# Patient Record
Sex: FEMALE | Race: WHITE | HISPANIC OR LATINO | Employment: UNEMPLOYED | URBAN - METROPOLITAN AREA
[De-identification: names, ages, dates, MRNs, and addresses within clinical notes are randomized per-mention and may not be internally consistent; named-entity substitution may affect disease eponyms.]

---

## 2018-01-03 ENCOUNTER — OFFICE VISIT (OUTPATIENT)
Dept: URGENT CARE | Facility: CLINIC | Age: 53
End: 2018-01-03
Payer: COMMERCIAL

## 2018-01-03 VITALS
RESPIRATION RATE: 16 BRPM | BODY MASS INDEX: 33.13 KG/M2 | SYSTOLIC BLOOD PRESSURE: 147 MMHG | WEIGHT: 180 LBS | TEMPERATURE: 98 F | HEIGHT: 62 IN | OXYGEN SATURATION: 96 % | DIASTOLIC BLOOD PRESSURE: 89 MMHG | HEART RATE: 82 BPM

## 2018-01-03 DIAGNOSIS — J01.90 ACUTE BACTERIAL SINUSITIS: Primary | ICD-10-CM

## 2018-01-03 DIAGNOSIS — J20.9 ACUTE PURULENT BRONCHITIS: ICD-10-CM

## 2018-01-03 DIAGNOSIS — B96.89 ACUTE BACTERIAL SINUSITIS: Primary | ICD-10-CM

## 2018-01-03 PROCEDURE — 99213 OFFICE O/P EST LOW 20 MIN: CPT | Mod: 25,S$GLB,, | Performed by: INTERNAL MEDICINE

## 2018-01-03 PROCEDURE — 96372 THER/PROPH/DIAG INJ SC/IM: CPT | Mod: S$GLB,,, | Performed by: INTERNAL MEDICINE

## 2018-01-03 RX ORDER — ROSUVASTATIN CALCIUM 20 MG/1
20 TABLET, COATED ORAL DAILY
COMMUNITY
End: 2023-01-19

## 2018-01-03 RX ORDER — LEVOTHYROXINE SODIUM 25 UG/1
25 TABLET ORAL DAILY
COMMUNITY

## 2018-01-03 RX ORDER — AMOXICILLIN AND CLAVULANATE POTASSIUM 875; 125 MG/1; MG/1
1 TABLET, FILM COATED ORAL EVERY 12 HOURS
Qty: 20 TABLET | Refills: 0 | Status: SHIPPED | OUTPATIENT
Start: 2018-01-03 | End: 2018-01-13

## 2018-01-03 RX ORDER — PANTOPRAZOLE SODIUM 40 MG/1
40 TABLET, DELAYED RELEASE ORAL DAILY
COMMUNITY
End: 2022-11-22

## 2018-01-03 RX ORDER — CODEINE PHOSPHATE AND GUAIFENESIN 10; 100 MG/5ML; MG/5ML
5 SOLUTION ORAL EVERY 4 HOURS PRN
Qty: 118 ML | Refills: 0 | Status: SHIPPED | OUTPATIENT
Start: 2018-01-03 | End: 2018-01-13

## 2018-01-03 RX ORDER — CARVEDILOL 6.25 MG/1
6.25 TABLET ORAL 2 TIMES DAILY WITH MEALS
COMMUNITY
End: 2018-06-18 | Stop reason: ALTCHOICE

## 2018-01-03 RX ORDER — BETAMETHASONE SODIUM PHOSPHATE AND BETAMETHASONE ACETATE 3; 3 MG/ML; MG/ML
9 INJECTION, SUSPENSION INTRA-ARTICULAR; INTRALESIONAL; INTRAMUSCULAR; SOFT TISSUE ONCE
Status: COMPLETED | OUTPATIENT
Start: 2018-01-03 | End: 2018-01-03

## 2018-01-03 RX ADMIN — BETAMETHASONE SODIUM PHOSPHATE AND BETAMETHASONE ACETATE 9 MG: 3; 3 INJECTION, SUSPENSION INTRA-ARTICULAR; INTRALESIONAL; INTRAMUSCULAR; SOFT TISSUE at 03:01

## 2018-01-03 NOTE — PROGRESS NOTES
"Subjective:       Patient ID: Amber Khan is a 52 y.o. female.    Vitals:  height is 5' 2" (1.575 m) and weight is 81.6 kg (180 lb). Her temperature is 98.3 °F (36.8 °C). Her blood pressure is 147/89 (abnormal) and her pulse is 82. Her respiration is 16 and oxygen saturation is 96%.     Chief Complaint: Cough (dry cough for 5 days )    Cough   This is a new problem. The current episode started in the past 7 days. The problem has been gradually worsening. The problem occurs constantly. The cough is non-productive. Associated symptoms include chills, ear congestion, ear pain, nasal congestion, a sore throat, shortness of breath and wheezing. The symptoms are aggravated by lying down. She has tried prescription cough suppressant for the symptoms. The treatment provided moderate relief. Her past medical history is significant for asthma and bronchitis.     Review of Systems   Constitution: Positive for chills.   HENT: Positive for congestion, ear pain and sore throat.    Respiratory: Positive for cough, shortness of breath and wheezing.        Objective:      Physical Exam   Constitutional: She appears well-developed and well-nourished.   HENT:   Head: Normocephalic and atraumatic.   Nose: Mucosal edema (purulent mucous discharge) present.   Eyes: Conjunctivae and EOM are normal. Pupils are equal, round, and reactive to light.   Neck: Normal range of motion. Neck supple.   Cardiovascular: Normal rate and regular rhythm.    Pulmonary/Chest: Effort normal.   Upper airway congestion   Vitals reviewed.      Assessment:       1. Acute bacterial sinusitis    2. Acute purulent bronchitis        Plan:         Acute bacterial sinusitis  -     betamethasone acetate-betamethasone sodium phosphate injection 9 mg; Inject 1.5 mLs (9 mg total) into the muscle once.  -     amoxicillin-clavulanate 875-125mg (AUGMENTIN) 875-125 mg per tablet; Take 1 tablet by mouth every 12 (twelve) hours.  Dispense: 20 tablet; Refill: 0    Acute " purulent bronchitis  -     betamethasone acetate-betamethasone sodium phosphate injection 9 mg; Inject 1.5 mLs (9 mg total) into the muscle once.  -     guaifenesin-codeine 100-10 mg/5 ml (CHERATUSSIN AC)  mg/5 mL syrup; Take 5 mLs by mouth every 4 (four) hours as needed for Cough.  Dispense: 118 mL; Refill: 0

## 2018-06-18 ENCOUNTER — OFFICE VISIT (OUTPATIENT)
Dept: URGENT CARE | Facility: CLINIC | Age: 53
End: 2018-06-18
Payer: COMMERCIAL

## 2018-06-18 VITALS
HEIGHT: 62 IN | OXYGEN SATURATION: 97 % | SYSTOLIC BLOOD PRESSURE: 137 MMHG | BODY MASS INDEX: 34.04 KG/M2 | RESPIRATION RATE: 18 BRPM | TEMPERATURE: 99 F | WEIGHT: 185 LBS | HEART RATE: 85 BPM | DIASTOLIC BLOOD PRESSURE: 84 MMHG

## 2018-06-18 DIAGNOSIS — R42 VERTIGO: ICD-10-CM

## 2018-06-18 DIAGNOSIS — H66.90 OTITIS MEDIA, UNSPECIFIED LATERALITY, UNSPECIFIED OTITIS MEDIA TYPE: Primary | ICD-10-CM

## 2018-06-18 PROCEDURE — 99214 OFFICE O/P EST MOD 30 MIN: CPT | Mod: S$GLB,,, | Performed by: FAMILY MEDICINE

## 2018-06-18 PROCEDURE — 3008F BODY MASS INDEX DOCD: CPT | Mod: CPTII,S$GLB,, | Performed by: FAMILY MEDICINE

## 2018-06-18 RX ORDER — ERGOCALCIFEROL 1.25 MG/1
CAPSULE ORAL
COMMUNITY
Start: 2018-05-03

## 2018-06-18 RX ORDER — AMLODIPINE BESYLATE 10 MG/1
TABLET ORAL
COMMUNITY
Start: 2018-06-04

## 2018-06-18 RX ORDER — PREGABALIN 75 MG/1
CAPSULE ORAL
COMMUNITY
Start: 2018-05-16 | End: 2023-01-19

## 2018-06-18 RX ORDER — MECLIZINE HYDROCHLORIDE 25 MG/1
25 TABLET ORAL 3 TIMES DAILY PRN
Qty: 30 TABLET | Refills: 0 | Status: SHIPPED | OUTPATIENT
Start: 2018-06-18 | End: 2022-11-22

## 2018-06-18 RX ORDER — ONDANSETRON 4 MG/1
4 TABLET, ORALLY DISINTEGRATING ORAL EVERY 8 HOURS PRN
Qty: 20 TABLET | Refills: 0 | Status: SHIPPED | OUTPATIENT
Start: 2018-06-18 | End: 2018-06-25

## 2018-06-18 RX ORDER — AMOXICILLIN AND CLAVULANATE POTASSIUM 875; 125 MG/1; MG/1
1 TABLET, FILM COATED ORAL 2 TIMES DAILY
Qty: 20 TABLET | Refills: 0 | Status: SHIPPED | OUTPATIENT
Start: 2018-06-18 | End: 2018-06-28

## 2018-06-18 RX ORDER — IBUPROFEN 800 MG/1
TABLET ORAL
COMMUNITY
Start: 2018-04-11 | End: 2022-11-22

## 2018-06-18 RX ORDER — AMITRIPTYLINE HYDROCHLORIDE 10 MG/1
TABLET, FILM COATED ORAL
COMMUNITY
Start: 2018-05-27 | End: 2023-03-16

## 2018-06-18 RX ORDER — MONTELUKAST SODIUM 10 MG/1
TABLET ORAL
COMMUNITY
Start: 2018-06-04 | End: 2022-11-22

## 2018-06-18 RX ORDER — LISINOPRIL 20 MG/1
TABLET ORAL
COMMUNITY
Start: 2018-05-23

## 2018-06-18 NOTE — PATIENT INSTRUCTIONS
Middle Ear Infection (Adult)  You have an infection of the middle ear, the space behind the eardrum. This is also called acute otitis media (AOM). Sometimes it is caused by the common cold. This is because congestion can block the internal passage (eustachian tube) that drains fluid from the middle ear. When the middle ear fills with fluid, bacteria can grow there and cause an infection. Oral antibiotics are used to treat this illness, not ear drops. Symptoms usually start to improve within 1 to 2 days of treatment.    Home care  The following are general care guidelines:  · Finish all of the antibiotic medicine given, even though you may feel better after the first few days.  · You may use over-the-counter medicine, such as acetaminophen or ibuprofen, to control pain and fever, unless something else was prescribed. If you have chronic liver or kidney disease or have ever had a stomach ulcer or gastrointestinal bleeding, talk with your healthcare provider before using these medicines. Do not give aspirin to anyone under 18 years of age who has a fever. It may cause severe illness or death.  Follow-up care  Follow up with your healthcare provider, or as advised, in 2 weeks if all symptoms have not gotten better, or if hearing doesn't go back to normal within 1 month.  When to seek medical advice  Call your healthcare provider right away if any of these occur:  · Ear pain gets worse or does not improve after 3 days of treatment  · Unusual drowsiness or confusion  · Neck pain, stiff neck, or headache  · Fluid or blood draining from the ear canal  · Fever of 100.4°F (38°C) or as advised   · Seizure  Date Last Reviewed: 6/1/2016 © 2000-2017 [employer]. [address] [place] [address], [place] [address]. All rights reserved. This information is not intended as a substitute for professional medical care. Always follow your healthcare professional's instructions.    Follow up with your doctor in a few days as  needed.  Return to the urgent care or go to the ER if symptoms get worse.    Sam Lubin MD

## 2018-06-18 NOTE — PROGRESS NOTES
"Subjective:       Patient ID: Amber Khan is a 53 y.o. female.    Vitals:  height is 5' 2" (1.575 m) and weight is 83.9 kg (185 lb). Her oral temperature is 98.6 °F (37 °C). Her blood pressure is 137/84 and her pulse is 85. Her respiration is 18 and oxygen saturation is 97%.     Chief Complaint: Otalgia    Otalgia    There is pain in the right ear. This is a new problem. The current episode started in the past 7 days. The problem occurs constantly. The problem has been gradually worsening. There has been no fever. The pain is at a severity of 5/10. The pain is moderate. Associated symptoms include headaches. Pertinent negatives include no abdominal pain, coughing or sore throat. She has tried ear drops for the symptoms. The treatment provided no relief.     Review of Systems   Constitution: Negative for chills, fever and malaise/fatigue.   HENT: Positive for ear pain. Negative for congestion, hoarse voice and sore throat.    Eyes: Negative for discharge and redness.   Cardiovascular: Negative for chest pain, dyspnea on exertion and leg swelling.   Respiratory: Negative for cough, shortness of breath, sputum production and wheezing.    Musculoskeletal: Negative for myalgias.   Gastrointestinal: Positive for nausea. Negative for abdominal pain.   Neurological: Positive for headaches and vertigo.       Objective:      Physical Exam   Constitutional: She is oriented to person, place, and time. She appears well-developed and well-nourished.   HENT:   Head: Normocephalic and atraumatic.   Right TM redness, auditory canal has moderate cerumen.    Left TM mild redness, auditory canal normal.   Eyes: EOM are normal. Pupils are equal, round, and reactive to light.   Neck: Normal range of motion. Neck supple. No JVD present. No tracheal deviation present. No thyromegaly present.   Cardiovascular: Normal rate, regular rhythm and normal heart sounds.  Exam reveals no gallop and no friction rub.    No murmur " heard.  Pulmonary/Chest: Breath sounds normal. No respiratory distress. She has no wheezes. She has no rales. She exhibits no tenderness.   Abdominal: Soft. Bowel sounds are normal. She exhibits no distension and no mass. There is no tenderness. There is no rebound and no guarding. No hernia.   Musculoskeletal: Normal range of motion. She exhibits no edema, tenderness or deformity.   Lymphadenopathy:     She has no cervical adenopathy.   Neurological: She is alert and oriented to person, place, and time. She displays normal reflexes. No cranial nerve deficit. She exhibits normal muscle tone. Coordination normal.   Skin: Skin is warm. Capillary refill takes less than 2 seconds. No rash noted. No erythema. No pallor.   Psychiatric: She has a normal mood and affect. Her behavior is normal. Judgment and thought content normal.   Vitals reviewed.      Assessment:       1. Otitis media, unspecified laterality, unspecified otitis media type    2. Vertigo        Plan:         Otitis media, unspecified laterality, unspecified otitis media type  -     amoxicillin-clavulanate 875-125mg (AUGMENTIN) 875-125 mg per tablet; Take 1 tablet by mouth 2 (two) times daily.  Dispense: 20 tablet; Refill: 0    Vertigo  -     ondansetron (ZOFRAN-ODT) 4 MG TbDL; Take 1 tablet (4 mg total) by mouth every 8 (eight) hours as needed (nausea/vomit).  Dispense: 20 tablet; Refill: 0  -     meclizine (ANTIVERT) 25 mg tablet; Take 1 tablet (25 mg total) by mouth 3 (three) times daily as needed for Dizziness.  Dispense: 30 tablet; Refill: 0      Follow up with your doctor in a few days as needed.  Return to the urgent care or go to the ER if symptoms get worse.    Sam Lubin MD

## 2018-12-06 ENCOUNTER — OFFICE VISIT (OUTPATIENT)
Dept: URGENT CARE | Facility: CLINIC | Age: 53
End: 2018-12-06
Payer: COMMERCIAL

## 2018-12-06 VITALS
SYSTOLIC BLOOD PRESSURE: 142 MMHG | BODY MASS INDEX: 34.04 KG/M2 | DIASTOLIC BLOOD PRESSURE: 91 MMHG | HEIGHT: 62 IN | TEMPERATURE: 100 F | HEART RATE: 95 BPM | WEIGHT: 185 LBS | OXYGEN SATURATION: 97 %

## 2018-12-06 DIAGNOSIS — J20.9 ACUTE PURULENT BRONCHITIS: ICD-10-CM

## 2018-12-06 DIAGNOSIS — B96.89 ACUTE BACTERIAL SINUSITIS: Primary | ICD-10-CM

## 2018-12-06 DIAGNOSIS — J01.90 ACUTE BACTERIAL SINUSITIS: Primary | ICD-10-CM

## 2018-12-06 PROCEDURE — 96372 THER/PROPH/DIAG INJ SC/IM: CPT | Mod: S$GLB,,, | Performed by: INTERNAL MEDICINE

## 2018-12-06 PROCEDURE — 99214 OFFICE O/P EST MOD 30 MIN: CPT | Mod: 25,S$GLB,, | Performed by: INTERNAL MEDICINE

## 2018-12-06 PROCEDURE — 3008F BODY MASS INDEX DOCD: CPT | Mod: CPTII,S$GLB,, | Performed by: INTERNAL MEDICINE

## 2018-12-06 RX ORDER — BETAMETHASONE SODIUM PHOSPHATE AND BETAMETHASONE ACETATE 3; 3 MG/ML; MG/ML
9 INJECTION, SUSPENSION INTRA-ARTICULAR; INTRALESIONAL; INTRAMUSCULAR; SOFT TISSUE ONCE
Status: COMPLETED | OUTPATIENT
Start: 2018-12-06 | End: 2018-12-06

## 2018-12-06 RX ORDER — AMOXICILLIN AND CLAVULANATE POTASSIUM 875; 125 MG/1; MG/1
1 TABLET, FILM COATED ORAL EVERY 12 HOURS
Qty: 20 TABLET | Refills: 0 | Status: SHIPPED | OUTPATIENT
Start: 2018-12-06 | End: 2018-12-16

## 2018-12-06 RX ORDER — CODEINE PHOSPHATE AND GUAIFENESIN 10; 100 MG/5ML; MG/5ML
5 SOLUTION ORAL EVERY 4 HOURS PRN
Qty: 118 ML | Refills: 0 | Status: SHIPPED | OUTPATIENT
Start: 2018-12-06 | End: 2018-12-16

## 2018-12-06 RX ADMIN — BETAMETHASONE SODIUM PHOSPHATE AND BETAMETHASONE ACETATE 9 MG: 3; 3 INJECTION, SUSPENSION INTRA-ARTICULAR; INTRALESIONAL; INTRAMUSCULAR; SOFT TISSUE at 10:12

## 2018-12-06 NOTE — PROGRESS NOTES
"Subjective:       Patient ID: Amber Khan is a 53 y.o. female.    Vitals:  height is 5' 2" (1.575 m) and weight is 83.9 kg (185 lb). Her oral temperature is 99.7 °F (37.6 °C). Her blood pressure is 142/91 (abnormal) and her pulse is 95. Her oxygen saturation is 97%.     Chief Complaint: URI    URI    This is a new problem. The current episode started in the past 7 days. The problem has been unchanged. There has been no fever. Associated symptoms include congestion, coughing, ear pain, headaches, a plugged ear sensation, rhinorrhea, sinus pain, sneezing, a sore throat and swollen glands. Pertinent negatives include no nausea, rash, vomiting or wheezing. She has tried NSAIDs (Nyquil and thera-flu) for the symptoms. The treatment provided no relief.       Constitution: Positive for fatigue and fever. Negative for chills and sweating.   HENT: Positive for ear pain, congestion, postnasal drip, sinus pain, sinus pressure, sore throat, trouble swallowing and voice change.    Neck: Positive for painful lymph nodes.   Eyes: Negative for eye redness.   Respiratory: Positive for cough and sputum production. Negative for chest tightness, COPD, shortness of breath, stridor, wheezing and asthma.    Gastrointestinal: Negative for nausea and vomiting.   Musculoskeletal: Positive for muscle ache.   Skin: Negative for rash.   Allergic/Immunologic: Positive for sneezing. Negative for seasonal allergies and asthma.   Neurological: Positive for headaches.   Hematologic/Lymphatic: Positive for swollen lymph nodes.       Objective:      Physical Exam   Constitutional: She appears well-developed and well-nourished.   HENT:   Head: Normocephalic and atraumatic.   Nose: Mucosal edema (purulent mucous) present. Right sinus exhibits maxillary sinus tenderness. Left sinus exhibits maxillary sinus tenderness.   Eyes: Conjunctivae and EOM are normal. Pupils are equal, round, and reactive to light.   Neck: Normal range of motion. Neck supple. "   Cardiovascular: Normal rate and regular rhythm.   Pulmonary/Chest: Effort normal.   Exp wheeze with upper airway congestion   Nursing note and vitals reviewed.      Assessment:       1. Acute bacterial sinusitis    2. Acute purulent bronchitis        Plan:         Acute bacterial sinusitis  -     betamethasone acetate-betamethasone sodium phosphate injection 9 mg  -     amoxicillin-clavulanate 875-125mg (AUGMENTIN) 875-125 mg per tablet; Take 1 tablet by mouth every 12 (twelve) hours. for 10 days  Dispense: 20 tablet; Refill: 0    Acute purulent bronchitis  -     betamethasone acetate-betamethasone sodium phosphate injection 9 mg  -     guaifenesin-codeine 100-10 mg/5 ml (CHERATUSSIN AC)  mg/5 mL syrup; Take 5 mLs by mouth every 4 (four) hours as needed for Cough.  Dispense: 118 mL; Refill: 0

## 2019-01-21 ENCOUNTER — OFFICE VISIT (OUTPATIENT)
Dept: URGENT CARE | Facility: CLINIC | Age: 54
End: 2019-01-21
Payer: COMMERCIAL

## 2019-01-21 VITALS
BODY MASS INDEX: 34.04 KG/M2 | OXYGEN SATURATION: 96 % | WEIGHT: 185 LBS | RESPIRATION RATE: 19 BRPM | HEART RATE: 90 BPM | SYSTOLIC BLOOD PRESSURE: 131 MMHG | TEMPERATURE: 98 F | DIASTOLIC BLOOD PRESSURE: 84 MMHG | HEIGHT: 62 IN

## 2019-01-21 DIAGNOSIS — R42 VERTIGO: ICD-10-CM

## 2019-01-21 DIAGNOSIS — H92.03 OTALGIA OF BOTH EARS: Primary | ICD-10-CM

## 2019-01-21 DIAGNOSIS — R11.0 NAUSEA: ICD-10-CM

## 2019-01-21 PROCEDURE — 99214 PR OFFICE/OUTPT VISIT, EST, LEVL IV, 30-39 MIN: ICD-10-PCS | Mod: 25,S$GLB,, | Performed by: FAMILY MEDICINE

## 2019-01-21 PROCEDURE — 96372 PR INJECTION,THERAP/PROPH/DIAG2ST, IM OR SUBCUT: ICD-10-PCS | Mod: S$GLB,,, | Performed by: FAMILY MEDICINE

## 2019-01-21 PROCEDURE — 99214 OFFICE O/P EST MOD 30 MIN: CPT | Mod: 25,S$GLB,, | Performed by: FAMILY MEDICINE

## 2019-01-21 PROCEDURE — 3008F BODY MASS INDEX DOCD: CPT | Mod: CPTII,S$GLB,, | Performed by: FAMILY MEDICINE

## 2019-01-21 PROCEDURE — 96372 THER/PROPH/DIAG INJ SC/IM: CPT | Mod: S$GLB,,, | Performed by: FAMILY MEDICINE

## 2019-01-21 PROCEDURE — 3008F PR BODY MASS INDEX (BMI) DOCUMENTED: ICD-10-PCS | Mod: CPTII,S$GLB,, | Performed by: FAMILY MEDICINE

## 2019-01-21 RX ORDER — MECLIZINE HYDROCHLORIDE 25 MG/1
25 TABLET ORAL 3 TIMES DAILY PRN
Qty: 30 TABLET | Refills: 0 | Status: SHIPPED | OUTPATIENT
Start: 2019-01-21

## 2019-01-21 RX ORDER — ONDANSETRON 4 MG/1
4 TABLET, ORALLY DISINTEGRATING ORAL EVERY 8 HOURS PRN
Qty: 20 TABLET | Refills: 0 | Status: SHIPPED | OUTPATIENT
Start: 2019-01-21 | End: 2019-01-28

## 2019-01-21 RX ORDER — AMOXICILLIN AND CLAVULANATE POTASSIUM 875; 125 MG/1; MG/1
1 TABLET, FILM COATED ORAL 2 TIMES DAILY
Qty: 20 TABLET | Refills: 0 | Status: SHIPPED | OUTPATIENT
Start: 2019-01-21 | End: 2019-01-31

## 2019-01-21 RX ORDER — BETAMETHASONE SODIUM PHOSPHATE AND BETAMETHASONE ACETATE 3; 3 MG/ML; MG/ML
9 INJECTION, SUSPENSION INTRA-ARTICULAR; INTRALESIONAL; INTRAMUSCULAR; SOFT TISSUE
Status: COMPLETED | OUTPATIENT
Start: 2019-01-21 | End: 2019-01-21

## 2019-01-21 RX ADMIN — BETAMETHASONE SODIUM PHOSPHATE AND BETAMETHASONE ACETATE 9 MG: 3; 3 INJECTION, SUSPENSION INTRA-ARTICULAR; INTRALESIONAL; INTRAMUSCULAR; SOFT TISSUE at 04:01

## 2019-01-21 NOTE — PATIENT INSTRUCTIONS
Earache, No Infection (Adult)  Earaches can happen without an infection. This occurs when air and fluid build up behind the eardrum causing a feeling of fullness and discomfort and reduced hearing. This is called otitis media with effusion (OME) or serous otitis media. It means there is fluid in the middle ear. It is not the same as acute otitis media, which is typically from infection.  OME can happen when you have a cold if congestion blocks the passage that drains the middle ear. This passage is called the eustachian tube. OME may also occur with nasal allergies or after a bacterial middle ear infection.    The pain or discomfort may come and go. You may hear clicking or popping sounds when you chew or swallow. You may feel that your balance is off. Or you may hear ringing in the ear.  It often takes from several weeks up to 3 months for the fluid to clear on its own. Oral pain relievers and ear drops help if there is pain. Decongestants and antihistamines sometimes help. Antibiotics don't help since there is no infection. Your doctor may prescribe a nasal spray to help reduce swelling in the nose and eustachian tube. This can allow the ear to drain.  If your OME doesn't improve after 3 months, surgery may be used to drain the fluid and insert a small tube in the eardrum to allow continued drainage.  Because the middle ear fluid can become infected, it is important to watch for signs of an ear infection which may develop later. These signs include increased ear pain, fever, or drainage from the ear.  Home care  The following guidelines will help you care for yourself at home:  · You may use over-the-counter medicine as directed to control pain, unless another medicine was prescribed. If you have chronic liver or kidney disease or ever had a stomach ulcer or GI bleeding, talk with your doctor before using these medicines. Aspirin should never be used in anyone under 18 years of age who is ill with a fever. It  may cause severe liver damage.  · You may use over-the-counter decongestants such as phenylephrine or pseudoephedrine. But they are not always helpful. Don't use nasal spray decongestants more than 3 days. Longer use can make congestion worse. Prescription nasal sprays from your doctor don't typically have those restrictions.  · Antihistamines may help if you are also having allergy symptoms.  · You may use medicines such as guaifenesin to thin mucus and promote drainage.  Follow-up care  Follow up with your healthcare provider or as advised if you are not feeling better after 3 days.  When to seek medical advice  Call your healthcare provider right away if any of the following occur:  · Your ear pain gets worse or does not start to improve   · Fever of 100.4°F (38°C) or higher, or as directed by your healthcare provider  · Fluid or blood draining from the ear  · Headache or sinus pain  · Stiff neck  · Unusual drowsiness or confusion  Date Last Reviewed: 10/1/2016  © 0396-7472 [email or web address]. [address] [place] [address]. All rights reserved. This information is not intended as a substitute for professional medical care. Always follow your healthcare professional's instructions.      Vertigo (Unknown Cause)    In addition to helping with hearing, the inner ear is part of the balance center of your body. Problems with the inner ear can a false feeling of motion. This is called vertigo. Often, it feels as if you or the room is spinning. A vertigo attack may cause sudden nausea, vomiting and heavy sweating. Severe vertigo causes a loss of balance and can cause you to fall. During vertigo, small head movements and changes in body position will often make the symptoms worse. You may also have ringing in the ears called tinnitus.  An episode of vertigo may last seconds, minutes or hours. Once you are over the first episode, it may never come back. However, symptoms may return off and on.  The cause  of your vertigo is not yet known. Possible causes of vertigo include:  · Inflammation of the inner ear  · Disease of the nerves to the inner ear  · Movement of calcium particles in the inner ear  · Poor blood flow to the balance centers of the brain  · Migraine headaches  Home care  · If symptoms are severe, rest quietly in bed. Change positions very slowly. There is usually one position that will feel best, such as lying on one side or lying on your back with your head slightly raised on pillows.  · Do not drive a car or work with dangerous machinery until symptoms have been gone for at least one week.  · Take medicine as prescribed to relieve your symptoms. Unless another medicine was prescribed for symptoms of nausea, vomiting, and dizziness, you may use over-the-counter motion sickness pills. Ask your pharmacist for suggestions.  Follow-up care  Follow up with your healthcare provider or as directed. If you are referred to a specialist or for testing, make the appointment promptly.  When to seek medical advice  Call your healthcare provider if any of the following occur:  · Fever of 100.4°F (38ºC) or higher, or as directed by your healthcare provider  · Vertigo worsens or is not controlled by prescribed medicine   · Repeated vomiting not relieved by prescribed medicine   · Severe headache  · Confusion  · Weakness of an arm or leg or one side of the face  · Difficulty with speech or vision  · Loss of consciousness   · Seizure  Date Last Reviewed: 8/16/2015 © 2000-2017 [employer]. [address] [place] [address]. All rights reserved. This information is not intended as a substitute for professional medical care. Always follow your healthcare professional's instructions.    Follow up with your ENT in a few days.  Return to the urgent care or go to the ER if symptoms get worse.    Sam Lubin MD

## 2019-01-21 NOTE — PROGRESS NOTES
"Subjective:       Patient ID: Amber Khan is a 53 y.o. female.    Vitals:  height is 5' 2" (1.575 m) and weight is 83.9 kg (185 lb). Her oral temperature is 97.6 °F (36.4 °C). Her blood pressure is 131/84 and her pulse is 90. Her respiration is 19 and oxygen saturation is 96%.     Chief Complaint: Otalgia (R ear pain)    Otalgia    There is pain in the right ear. This is a new problem. The current episode started 1 to 4 weeks ago (a few weeks ago). The problem occurs constantly. The problem has been unchanged. There has been no fever. The pain is at a severity of 8/10. The pain is severe. Pertinent negatives include no coughing, diarrhea, headaches, hearing loss, rash, sore throat or vomiting. She has tried nothing for the symptoms. Her past medical history is significant for a chronic ear infection and a tympanostomy tube.       Constitution: Negative for chills, fatigue and fever.   HENT: Positive for ear pain. Negative for hearing loss, congestion and sore throat.    Neck: Negative for painful lymph nodes.   Cardiovascular: Negative for chest pain and leg swelling.   Eyes: Negative for double vision and blurred vision.   Respiratory: Negative for cough and shortness of breath.    Gastrointestinal: Negative for nausea, vomiting and diarrhea.   Genitourinary: Negative for dysuria, frequency, urgency and history of kidney stones.   Musculoskeletal: Negative for joint pain, joint swelling, muscle cramps and muscle ache.   Skin: Negative for color change, pale, rash, erythema and bruising.   Allergic/Immunologic: Negative for seasonal allergies.   Neurological: Negative for dizziness, history of vertigo, light-headedness, passing out and headaches.   Hematologic/Lymphatic: Negative for swollen lymph nodes.   Psychiatric/Behavioral: Negative for nervous/anxious, sleep disturbance and depression. The patient is not nervous/anxious.        Objective:      Physical Exam   Constitutional: She is oriented to person, " place, and time. She appears well-developed and well-nourished.   HENT:   Head: Normocephalic and atraumatic.   Right Ear: External ear normal.   Left Ear: External ear normal.   Mouth/Throat: Oropharynx is clear and moist.   Right ear TM has mild redness, ear tube appeared to be fallen off the TM.  Normal auditory canal.    Left ear TM and auditory canal normal.   Eyes: EOM are normal. Pupils are equal, round, and reactive to light.   Neck: Normal range of motion. Neck supple. No JVD present. No tracheal deviation present. No thyromegaly present.   Cardiovascular: Normal rate, regular rhythm and normal heart sounds. Exam reveals no gallop and no friction rub.   No murmur heard.  Pulmonary/Chest: Breath sounds normal. No respiratory distress. She has no wheezes. She has no rales. She exhibits no tenderness.   Abdominal: Soft. Bowel sounds are normal. She exhibits no distension and no mass. There is no tenderness. There is no rebound and no guarding. No hernia.   Musculoskeletal: Normal range of motion. She exhibits no edema, tenderness or deformity.   Lymphadenopathy:     She has no cervical adenopathy.   Neurological: She is alert and oriented to person, place, and time. She displays normal reflexes. No cranial nerve deficit. She exhibits normal muscle tone. Coordination normal.   Skin: Skin is warm. Capillary refill takes less than 2 seconds. No rash noted. No erythema. No pallor.   Psychiatric: She has a normal mood and affect. Her behavior is normal. Judgment and thought content normal.   Vitals reviewed.      Assessment:       1. Otalgia of both ears    2. Vertigo    3. Nausea        Plan:         Otalgia of both ears  -     amoxicillin-clavulanate 875-125mg (AUGMENTIN) 875-125 mg per tablet; Take 1 tablet by mouth 2 (two) times daily. for 10 days  Dispense: 20 tablet; Refill: 0  -     betamethasone acetate-betamethasone sodium phosphate injection 9 mg    Vertigo  -     betamethasone acetate-betamethasone  sodium phosphate injection 9 mg  -     meclizine (ANTIVERT) 25 mg tablet; Take 1 tablet (25 mg total) by mouth 3 (three) times daily as needed for Dizziness.  Dispense: 30 tablet; Refill: 0    Nausea  -     ondansetron (ZOFRAN-ODT) 4 MG TbDL; Take 1 tablet (4 mg total) by mouth every 8 (eight) hours as needed (nausea/vomit).  Dispense: 20 tablet; Refill: 0          Patient Instructions     Earache, No Infection (Adult)  Earaches can happen without an infection. This occurs when air and fluid build up behind the eardrum causing a feeling of fullness and discomfort and reduced hearing. This is called otitis media with effusion (OME) or serous otitis media. It means there is fluid in the middle ear. It is not the same as acute otitis media, which is typically from infection.  OME can happen when you have a cold if congestion blocks the passage that drains the middle ear. This passage is called the eustachian tube. OME may also occur with nasal allergies or after a bacterial middle ear infection.    The pain or discomfort may come and go. You may hear clicking or popping sounds when you chew or swallow. You may feel that your balance is off. Or you may hear ringing in the ear.  It often takes from several weeks up to 3 months for the fluid to clear on its own. Oral pain relievers and ear drops help if there is pain. Decongestants and antihistamines sometimes help. Antibiotics don't help since there is no infection. Your doctor may prescribe a nasal spray to help reduce swelling in the nose and eustachian tube. This can allow the ear to drain.  If your OME doesn't improve after 3 months, surgery may be used to drain the fluid and insert a small tube in the eardrum to allow continued drainage.  Because the middle ear fluid can become infected, it is important to watch for signs of an ear infection which may develop later. These signs include increased ear pain, fever, or drainage from the ear.  Home care  The following  guidelines will help you care for yourself at home:  · You may use over-the-counter medicine as directed to control pain, unless another medicine was prescribed. If you have chronic liver or kidney disease or ever had a stomach ulcer or GI bleeding, talk with your doctor before using these medicines. Aspirin should never be used in anyone under 18 years of age who is ill with a fever. It may cause severe liver damage.  · You may use over-the-counter decongestants such as phenylephrine or pseudoephedrine. But they are not always helpful. Don't use nasal spray decongestants more than 3 days. Longer use can make congestion worse. Prescription nasal sprays from your doctor don't typically have those restrictions.  · Antihistamines may help if you are also having allergy symptoms.  · You may use medicines such as guaifenesin to thin mucus and promote drainage.  Follow-up care  Follow up with your healthcare provider or as advised if you are not feeling better after 3 days.  When to seek medical advice  Call your healthcare provider right away if any of the following occur:  · Your ear pain gets worse or does not start to improve   · Fever of 100.4°F (38°C) or higher, or as directed by your healthcare provider  · Fluid or blood draining from the ear  · Headache or sinus pain  · Stiff neck  · Unusual drowsiness or confusion  Date Last Reviewed: 10/1/2016  © 8350-9641 [employer]. [address] [place] [address]. All rights reserved. This information is not intended as a substitute for professional medical care. Always follow your healthcare professional's instructions.      Vertigo (Unknown Cause)    In addition to helping with hearing, the inner ear is part of the balance center of your body. Problems with the inner ear can a false feeling of motion. This is called vertigo. Often, it feels as if you or the room is spinning. A vertigo attack may cause sudden nausea, vomiting and heavy sweating.  Severe vertigo causes a loss of balance and can cause you to fall. During vertigo, small head movements and changes in body position will often make the symptoms worse. You may also have ringing in the ears called tinnitus.  An episode of vertigo may last seconds, minutes or hours. Once you are over the first episode, it may never come back. However, symptoms may return off and on.  The cause of your vertigo is not yet known. Possible causes of vertigo include:  · Inflammation of the inner ear  · Disease of the nerves to the inner ear  · Movement of calcium particles in the inner ear  · Poor blood flow to the balance centers of the brain  · Migraine headaches  Home care  · If symptoms are severe, rest quietly in bed. Change positions very slowly. There is usually one position that will feel best, such as lying on one side or lying on your back with your head slightly raised on pillows.  · Do not drive a car or work with dangerous machinery until symptoms have been gone for at least one week.  · Take medicine as prescribed to relieve your symptoms. Unless another medicine was prescribed for symptoms of nausea, vomiting, and dizziness, you may use over-the-counter motion sickness pills. Ask your pharmacist for suggestions.  Follow-up care  Follow up with your healthcare provider or as directed. If you are referred to a specialist or for testing, make the appointment promptly.  When to seek medical advice  Call your healthcare provider if any of the following occur:  · Fever of 100.4°F (38ºC) or higher, or as directed by your healthcare provider  · Vertigo worsens or is not controlled by prescribed medicine   · Repeated vomiting not relieved by prescribed medicine   · Severe headache  · Confusion  · Weakness of an arm or leg or one side of the face  · Difficulty with speech or vision  · Loss of consciousness   · Seizure  Date Last Reviewed: 8/16/2015  © 1235-6405 [employer]. [address] [facility],  [place] [address]. All rights reserved. This information is not intended as a substitute for professional medical care. Always follow your healthcare professional's instructions.    Follow up with your ENT in a few days.  Return to the urgent care or go to the ER if symptoms get worse.    Sam Lubin MD

## 2019-02-18 ENCOUNTER — OFFICE VISIT (OUTPATIENT)
Dept: URGENT CARE | Facility: CLINIC | Age: 54
End: 2019-02-18
Payer: COMMERCIAL

## 2019-02-18 VITALS
RESPIRATION RATE: 19 BRPM | HEIGHT: 62 IN | OXYGEN SATURATION: 96 % | HEART RATE: 89 BPM | BODY MASS INDEX: 34.04 KG/M2 | SYSTOLIC BLOOD PRESSURE: 128 MMHG | WEIGHT: 185 LBS | DIASTOLIC BLOOD PRESSURE: 77 MMHG | TEMPERATURE: 99 F

## 2019-02-18 DIAGNOSIS — R05.9 COUGH: ICD-10-CM

## 2019-02-18 DIAGNOSIS — R68.89 FLU-LIKE SYMPTOMS: ICD-10-CM

## 2019-02-18 DIAGNOSIS — J10.1 INFLUENZA A: Primary | ICD-10-CM

## 2019-02-18 LAB
CTP QC/QA: YES
FLUAV AG NPH QL: POSITIVE
FLUBV AG NPH QL: NEGATIVE

## 2019-02-18 PROCEDURE — 3008F PR BODY MASS INDEX (BMI) DOCUMENTED: ICD-10-PCS | Mod: CPTII,S$GLB,, | Performed by: FAMILY MEDICINE

## 2019-02-18 PROCEDURE — 87804 POCT INFLUENZA A/B: ICD-10-PCS | Mod: 59,QW,S$GLB, | Performed by: FAMILY MEDICINE

## 2019-02-18 PROCEDURE — 99214 OFFICE O/P EST MOD 30 MIN: CPT | Mod: S$GLB,,, | Performed by: FAMILY MEDICINE

## 2019-02-18 PROCEDURE — 87804 INFLUENZA ASSAY W/OPTIC: CPT | Mod: QW,S$GLB,, | Performed by: FAMILY MEDICINE

## 2019-02-18 PROCEDURE — 99214 PR OFFICE/OUTPT VISIT, EST, LEVL IV, 30-39 MIN: ICD-10-PCS | Mod: S$GLB,,, | Performed by: FAMILY MEDICINE

## 2019-02-18 PROCEDURE — 3008F BODY MASS INDEX DOCD: CPT | Mod: CPTII,S$GLB,, | Performed by: FAMILY MEDICINE

## 2019-02-18 RX ORDER — CODEINE PHOSPHATE AND GUAIFENESIN 10; 100 MG/5ML; MG/5ML
5 SOLUTION ORAL 3 TIMES DAILY PRN
Qty: 120 ML | Refills: 0 | Status: SHIPPED | OUTPATIENT
Start: 2019-02-18 | End: 2019-02-25

## 2019-02-18 RX ORDER — OSELTAMIVIR PHOSPHATE 75 MG/1
75 CAPSULE ORAL 2 TIMES DAILY
Qty: 10 CAPSULE | Refills: 0 | Status: SHIPPED | OUTPATIENT
Start: 2019-02-18 | End: 2019-02-23

## 2019-02-18 NOTE — PATIENT INSTRUCTIONS
Influenza (Adult)    Influenza is also called the flu. It is a viral illness that affects the air passages of your lungs. It is different from the common cold. The flu can easily be passed from one to person to another. It may be spread through the air by coughing and sneezing. Or it can be spread by touching the sick person and then touching your own eyes, nose, or mouth.  The flu starts 1 to 3 days after you are exposed to the flu virus. It may last for 1 to 2 weeks but many people feel tired or fatigued for many weeks afterward. You usually dont need to take antibiotics unless you have a complication. This might be an ear or sinus infection or pneumonia.  Symptoms of the flu may be mild or severe. They can include extreme tiredness (wanting to stay in bed all day), chills, fevers, muscle aches, soreness with eye movement, headache, and a dry, hacking cough.  Home care  Follow these guidelines when caring for yourself at home:  · Avoid being around cigarette smoke, whether yours or other peoples.  · Acetaminophen or ibuprofen will help ease your fever, muscle aches, and headache. Dont give aspirin to anyone younger than 18 who has the flu. Aspirin can harm the liver.  · Nausea and loss of appetite are common with the flu. Eat light meals. Drink 6 to 8 glasses of liquids every day. Good choices are water, sport drinks, soft drinks without caffeine, juices, tea, and soup. Extra fluids will also help loosen secretions in your nose and lungs.  · Over-the-counter cold medicines will not make the flu go away faster. But the medicines may help with coughing, sore throat, and congestion in your nose and sinuses. Dont use a decongestant if you have high blood pressure.  · Stay home until your fever has been gone for at least 24 hours without using medicine to reduce fever.  Follow-up care  Follow up with your healthcare provider, or as advised, if you are not getting better over the next week.  If you are age 65 or  older, talk with your provider about getting a pneumococcal vaccine every 5 years. You should also get this vaccine if you have chronic asthma or COPD. All adults should get a flu vaccine every fall. Ask your provider about this.  When to seek medical advice  Call your healthcare provider right away if any of these occur:  · Cough with lots of colored mucus (sputum) or blood in your mucus  · Chest pain, shortness of breath, wheezing, or trouble breathing  · Severe headache, or face, neck, or ear pain  · New rash with fever  · Fever of 100.4°F (38°C) or higher, or as directed by your healthcare provider  · Confusion, behavior change, or seizure  · Severe weakness or dizziness  · You get a new fever or cough after getting better for a few days  Date Last Reviewed: 1/1/2017  © 0333-8870 [employer]. [address] [place] [address], [place] [address]. All rights reserved. This information is not intended as a substitute for professional medical care. Always follow your healthcare professional's instructions.      Follow up with your doctor in a few days as needed.  Return to the urgent care or go to the ER if symptoms get worse.    Sam Lubin MD

## 2019-02-18 NOTE — PROGRESS NOTES
"Subjective:       Patient ID: Amber Khan is a 53 y.o. female.    Vitals:  height is 5' 2" (1.575 m) and weight is 83.9 kg (185 lb). Her oral temperature is 99.1 °F (37.3 °C). Her blood pressure is 128/77 and her pulse is 89. Her respiration is 19 and oxygen saturation is 96%.     Chief Complaint: URI    URI    This is a new problem. The current episode started in the past 7 days (saturday). The problem has been gradually worsening. There has been no fever. Associated symptoms include congestion, coughing, ear pain, headaches, sinus pain and sneezing. Pertinent negatives include no abdominal pain, chest pain, diarrhea, dysuria, joint pain, joint swelling, nausea, neck pain, plugged ear sensation, rash, rhinorrhea, sore throat, swollen glands, vomiting or wheezing. She has tried acetaminophen (aintbiotic) for the symptoms. The treatment provided mild relief.       Constitution: Positive for chills. Negative for sweating, fatigue and fever.   HENT: Positive for ear pain, congestion, postnasal drip, sinus pain and sinus pressure. Negative for sore throat and voice change.    Neck: Negative for neck pain and painful lymph nodes.   Cardiovascular: Negative for chest pain.   Eyes: Negative for eye redness.   Respiratory: Positive for cough and sputum production. Negative for chest tightness, bloody sputum, COPD, shortness of breath, stridor, wheezing and asthma.    Gastrointestinal: Negative for abdominal pain, nausea, vomiting, diarrhea and heartburn.   Genitourinary: Negative for dysuria.   Musculoskeletal: Positive for muscle ache.   Skin: Negative for rash and erythema.   Allergic/Immunologic: Positive for sneezing. Negative for seasonal allergies and asthma.   Neurological: Positive for headaches.   Hematologic/Lymphatic: Negative for swollen lymph nodes.       Objective:      Physical Exam   Constitutional: She is oriented to person, place, and time. She appears well-developed and well-nourished.   HENT:   Head: " Normocephalic and atraumatic.   Right Ear: External ear normal.   Left Ear: External ear normal.   Mouth/Throat: Oropharynx is clear and moist.   Eyes: EOM are normal. Pupils are equal, round, and reactive to light.   Neck: Normal range of motion. Neck supple.   Cardiovascular: Normal rate, regular rhythm and normal heart sounds. Exam reveals no gallop and no friction rub.   No murmur heard.  Pulmonary/Chest: Breath sounds normal. No respiratory distress. She has no wheezes. She has no rales.   Abdominal: Soft. Bowel sounds are normal. She exhibits no distension. There is no tenderness. There is no rebound and no guarding.   Musculoskeletal: Normal range of motion. She exhibits no edema, tenderness or deformity.   Lymphadenopathy:     She has no cervical adenopathy.   Neurological: She is alert and oriented to person, place, and time. No cranial nerve deficit. She exhibits normal muscle tone. Coordination normal.   Skin: Skin is warm. Capillary refill takes less than 2 seconds. No rash noted. No erythema. No pallor.   Psychiatric: She has a normal mood and affect. Her behavior is normal. Judgment and thought content normal.   Vitals reviewed.      Assessment:       1. Influenza A    2. Flu-like symptoms    3. Cough        Plan:         Influenza A  -     oseltamivir (TAMIFLU) 75 MG capsule; Take 1 capsule (75 mg total) by mouth 2 (two) times daily. for 5 days  Dispense: 10 capsule; Refill: 0    Flu-like symptoms  -     POCT Influenza A/B    Cough  -     guaifenesin-codeine 100-10 mg/5 ml (CHERATUSSIN AC)  mg/5 mL syrup; Take 5 mLs by mouth 3 (three) times daily as needed for Cough.  Dispense: 120 mL; Refill: 0          Patient Instructions     Influenza (Adult)    Influenza is also called the flu. It is a viral illness that affects the air passages of your lungs. It is different from the common cold. The flu can easily be passed from one to person to another. It may be spread through the air by coughing and  sneezing. Or it can be spread by touching the sick person and then touching your own eyes, nose, or mouth.  The flu starts 1 to 3 days after you are exposed to the flu virus. It may last for 1 to 2 weeks but many people feel tired or fatigued for many weeks afterward. You usually dont need to take antibiotics unless you have a complication. This might be an ear or sinus infection or pneumonia.  Symptoms of the flu may be mild or severe. They can include extreme tiredness (wanting to stay in bed all day), chills, fevers, muscle aches, soreness with eye movement, headache, and a dry, hacking cough.  Home care  Follow these guidelines when caring for yourself at home:  · Avoid being around cigarette smoke, whether yours or other peoples.  · Acetaminophen or ibuprofen will help ease your fever, muscle aches, and headache. Dont give aspirin to anyone younger than 18 who has the flu. Aspirin can harm the liver.  · Nausea and loss of appetite are common with the flu. Eat light meals. Drink 6 to 8 glasses of liquids every day. Good choices are water, sport drinks, soft drinks without caffeine, juices, tea, and soup. Extra fluids will also help loosen secretions in your nose and lungs.  · Over-the-counter cold medicines will not make the flu go away faster. But the medicines may help with coughing, sore throat, and congestion in your nose and sinuses. Dont use a decongestant if you have high blood pressure.  · Stay home until your fever has been gone for at least 24 hours without using medicine to reduce fever.  Follow-up care  Follow up with your healthcare provider, or as advised, if you are not getting better over the next week.  If you are age 65 or older, talk with your provider about getting a pneumococcal vaccine every 5 years. You should also get this vaccine if you have chronic asthma or COPD. All adults should get a flu vaccine every fall. Ask your provider about this.  When to seek medical advice  Call your  healthcare provider right away if any of these occur:  · Cough with lots of colored mucus (sputum) or blood in your mucus  · Chest pain, shortness of breath, wheezing, or trouble breathing  · Severe headache, or face, neck, or ear pain  · New rash with fever  · Fever of 100.4°F (38°C) or higher, or as directed by your healthcare provider  · Confusion, behavior change, or seizure  · Severe weakness or dizziness  · You get a new fever or cough after getting better for a few days  Date Last Reviewed: 1/1/2017  © 0136-0720 [employer]. [address] [place] [address]. All rights reserved. This information is not intended as a substitute for professional medical care. Always follow your healthcare professional's instructions.      Follow up with your doctor in a few days as needed.  Return to the urgent care or go to the ER if symptoms get worse.    Sma Lubin MD

## 2019-04-26 ENCOUNTER — TELEPHONE (OUTPATIENT)
Dept: PAIN MEDICINE | Facility: CLINIC | Age: 54
End: 2019-04-26

## 2019-04-26 NOTE — TELEPHONE ENCOUNTER
Called to confirm appointment with Dr. Bradley on 5/2/19 @ 11:00am in Pain management clinic.    Pt did not answer. Left pt detailed message to contact office @ [phone] to confirm appointment

## 2019-05-02 ENCOUNTER — OFFICE VISIT (OUTPATIENT)
Dept: PAIN MEDICINE | Facility: CLINIC | Age: 54
End: 2019-05-02
Attending: ANESTHESIOLOGY
Payer: COMMERCIAL

## 2019-05-02 ENCOUNTER — TELEPHONE (OUTPATIENT)
Dept: RHEUMATOLOGY | Facility: CLINIC | Age: 54
End: 2019-05-02

## 2019-05-02 VITALS
RESPIRATION RATE: 18 BRPM | WEIGHT: 183 LBS | HEIGHT: 62 IN | DIASTOLIC BLOOD PRESSURE: 83 MMHG | BODY MASS INDEX: 33.68 KG/M2 | SYSTOLIC BLOOD PRESSURE: 128 MMHG | TEMPERATURE: 97 F | HEART RATE: 83 BPM

## 2019-05-02 DIAGNOSIS — M79.7 FIBROMYALGIA: Primary | ICD-10-CM

## 2019-05-02 DIAGNOSIS — M79.18 MYOFASCIAL PAIN: ICD-10-CM

## 2019-05-02 DIAGNOSIS — G89.4 CHRONIC PAIN DISORDER: ICD-10-CM

## 2019-05-02 PROCEDURE — 99999 PR PBB SHADOW E&M-EST. PATIENT-LVL V: CPT | Mod: PBBFAC,,, | Performed by: ANESTHESIOLOGY

## 2019-05-02 PROCEDURE — 99244 PR OFFICE CONSULTATION,LEVEL IV: ICD-10-PCS | Mod: S$GLB,,, | Performed by: ANESTHESIOLOGY

## 2019-05-02 PROCEDURE — 99999 PR PBB SHADOW E&M-EST. PATIENT-LVL V: ICD-10-PCS | Mod: PBBFAC,,, | Performed by: ANESTHESIOLOGY

## 2019-05-02 PROCEDURE — 99244 OFF/OP CNSLTJ NEW/EST MOD 40: CPT | Mod: S$GLB,,, | Performed by: ANESTHESIOLOGY

## 2019-05-02 RX ORDER — BUDESONIDE AND FORMOTEROL FUMARATE DIHYDRATE 160; 4.5 UG/1; UG/1
AEROSOL RESPIRATORY (INHALATION)
COMMUNITY
End: 2022-11-22

## 2019-05-02 RX ORDER — LEVOFLOXACIN 500 MG/1
TABLET, FILM COATED ORAL
COMMUNITY
End: 2022-09-05

## 2019-05-02 RX ORDER — ALBUTEROL SULFATE 90 UG/1
AEROSOL, METERED RESPIRATORY (INHALATION)
COMMUNITY
End: 2023-11-25

## 2019-05-02 RX ORDER — FLUTICASONE PROPIONATE 50 MCG
SPRAY, SUSPENSION (ML) NASAL
COMMUNITY
End: 2022-11-22

## 2019-05-02 RX ORDER — LISINOPRIL AND HYDROCHLOROTHIAZIDE 12.5; 2 MG/1; MG/1
TABLET ORAL
COMMUNITY

## 2019-05-02 RX ORDER — IBUPROFEN 800 MG/1
TABLET ORAL
COMMUNITY
End: 2022-11-22

## 2019-05-02 RX ORDER — DULOXETIN HYDROCHLORIDE 30 MG/1
30 CAPSULE, DELAYED RELEASE ORAL 2 TIMES DAILY
Qty: 60 CAPSULE | Refills: 2 | Status: SHIPPED | OUTPATIENT
Start: 2019-05-02 | End: 2019-06-21 | Stop reason: SDUPTHER

## 2019-05-02 RX ORDER — HYOSCYAMINE SULFATE 0.125 MG
0.12 TABLET ORAL
COMMUNITY
End: 2022-11-22

## 2019-05-02 RX ORDER — AZELASTINE 1 MG/ML
SPRAY, METERED NASAL
COMMUNITY

## 2019-05-02 NOTE — LETTER
May 2, 2019      Vishal Womack, DARIELA  [address]  [address]  [facility] [place] [address]           [facility] [address]  [address]  [facility] [address]  Phone: [phone]  Fax: [phone]          Patient: Amber Khan   MR Number: [id number]   YOB: 1965   Date of Visit: 5/2/2019       Dear Vishal Womack:    Thank you for referring Amber Khan to me for evaluation. Attached you will find relevant portions of my assessment and plan of care.    If you have questions, please do not hesitate to call me. I look forward to following Amber Khan along with you.    Sincerely,    Joselo Bradley MD    Enclosure  CC:  No Recipients    If you would like to receive this communication electronically, please contact [email or web address] or [phone] to request more information on [employer] access.    For providers and/or their staff who would like to refer a patient to [facility], please contact us through our one-stop-shop provider referral line, [facility], at [phone].    If you feel you have received this communication in error or would no longer like to receive these types of communications, please e-mail [email or web address]

## 2019-05-02 NOTE — PROGRESS NOTES
Chronic Pain - New Consult    Referring Physician: Vishal Womack NP    Chief Complaint:   Chief Complaint   Patient presents with    Leg Pain     bilateral    Arm Pain     bilateral    Shoulder Pain     bilateral    Hip Pain     bilatear        SUBJECTIVE:    Amber Khan presents to the clinic for the evaluation of full body pain. The pain started 10 years ago following fibromyalgia and symptoms have been worsening.The pain is located in the bilateral legs, shoulders, and arms area and radiates to the fingers.  The pain is described as aching, burning, numbing, sharp, shooting, throbbing, tight band, tingling and cold and constant and is rated as 10/10. The pain is rated with a score of  8/10 on the BEST day and a score of 10/10 on the WORST day.  Symptoms interfere with daily activity, sleeping and work. She states that she is unable to fully function anymore due to the pain. She is unable to sleep throughout the night. She is unable to take care of her grandchild due to the pain. The pain is exacerbated by Standing, Bending, Walking and Morning.  The pain is mitigated by nothing. She reports spending 2 hours per day reclining. The patient reports 2-3 hours of uninterrupted sleep per night.    She has previously tried elavil and has stopped it due to this not working. She has previously tried gabapentin which did not provide relief. She is currently taking lyrica 75mg daily with an occasional second dose on severe days. She is also taking ibuprofen 800mg prn. She has not tried physical therapy.     Patient denies night fever/night sweats, urinary incontinence, bowel incontinence, significant weight loss and significant motor weakness.    Physical Therapy/Home Exercise: yes      Pain Disability Index Review:  Last 3 PDI Scores 5/2/2019   Pain Disability Index (PDI) 50       Pain Medications:  lyrica 75mg daily  Ibuprofen 800mg prn     report:  Not applicable    Pain Procedures: none  Imaging: none    Past  Medical History:   Diagnosis Date    Asthma     Fibromyalgia 2009    Hyperlipidemia     Hypertension     Hypothyroidism      Past Surgical History:   Procedure Laterality Date    HYSTERECTOMY      TUBAL LIGATION       Social History     Socioeconomic History    Marital status:      Spouse name: Not on file    Number of children: Not on file    Years of education: Not on file    Highest education level: Not on file   Occupational History    Not on file   Social Needs    Financial resource strain: Not on file    Food insecurity:     Worry: Not on file     Inability: Not on file    Transportation needs:     Medical: Not on file     Non-medical: Not on file   Tobacco Use    Smoking status: Never Smoker    Smokeless tobacco: Never Used   Substance and Sexual Activity    Alcohol use: No    Drug use: No    Sexual activity: Not on file   Lifestyle    Physical activity:     Days per week: Not on file     Minutes per session: Not on file    Stress: Not on file   Relationships    Social connections:     Talks on phone: Not on file     Gets together: Not on file     Attends Synagogue service: Not on file     Active member of club or organization: Not on file     Attends meetings of clubs or organizations: Not on file     Relationship status: Not on file   Other Topics Concern    Not on file   Social History Narrative    Not on file     No family history on file.    Review of patient's allergies indicates:  No Known Allergies    Current Outpatient Medications   Medication Sig    albuterol (PROAIR HFA) 90 mcg/actuation inhaler ProAir HFA 90 mcg/actuation aerosol inhaler   Inhale 2 puffs every 4-6 hours by inhalation route as needed.    amitriptyline (ELAVIL) 10 MG tablet     amLODIPine (NORVASC) 10 MG tablet     azelastine (ASTELIN) 137 mcg (0.1 %) nasal spray azelastine 137 mcg (0.1 %) nasal spray aerosol   Spray 2 sprays twice a day by intranasal route.    budesonide-formoterol 160-4.5 mcg  (SYMBICORT) 160-4.5 mcg/actuation HFAA Symbicort 160 mcg-4.5 mcg/actuation HFA aerosol inhaler   Inhale 2 puffs twice a day by inhalation route.    fluticasone propionate (FLONASE) 50 mcg/actuation nasal spray fluticasone propionate 50 mcg/actuation nasal spray,suspension    hyoscyamine (ANASPAZ,LEVSIN) 0.125 mg Tab Take 0.125 mg by mouth.    ibuprofen (ADVIL,MOTRIN) 800 MG tablet ibuprofen 800 mg tablet    levoFLOXacin (LEVAQUIN) 500 MG tablet levofloxacin 500 mg tablet    levothyroxine (SYNTHROID) 25 MCG tablet Take 25 mcg by mouth once daily.    lisinopril (PRINIVIL,ZESTRIL) 20 MG tablet     lisinopril-hydrochlorothiazide (PRINZIDE,ZESTORETIC) 20-12.5 mg per tablet lisinopril 20 mg-hydrochlorothiazide 12.5 mg tablet   Take 1 tablet(s) every day by oral route.    LYRICA 75 mg capsule     meclizine (ANTIVERT) 25 mg tablet Take 1 tablet (25 mg total) by mouth 3 (three) times daily as needed for Dizziness.    meclizine (ANTIVERT) 25 mg tablet Take 1 tablet (25 mg total) by mouth 3 (three) times daily as needed for Dizziness.    montelukast (SINGULAIR) 10 mg tablet     pantoprazole (PROTONIX) 40 MG tablet Take 40 mg by mouth once daily.    rosuvastatin (CRESTOR) 20 MG tablet Take 20 mg by mouth once daily.    VITAMIN D2 50,000 unit capsule     ibuprofen (ADVIL,MOTRIN) 800 MG tablet      No current facility-administered medications for this visit.        REVIEW OF SYSTEMS:    GENERAL:  No weight loss, malaise or fevers.  HEENT:  Negative for frequent or significant headaches.  NECK:  Negative for lumps, goiter, pain and significant neck swelling.  RESPIRATORY:  Negative for cough, wheezing or shortness of breath.  CARDIOVASCULAR:  Negative for chest pain, leg swelling or palpitations.  GI:  Negative for abdominal discomfort, blood in stools or black stools or change in bowel habits.  MUSCULOSKELETAL:  See HPI.  SKIN:  Negative for lesions, rash, and itching.  PSYCH:  +ve for sleep disturbance, mood  "disorder and recent psychosocial stressors.  HEMATOLOGY/LYMPHOLOGY:  Negative for prolonged bleeding, bruising easily or swollen nodes.  NEURO:   No history of headaches, syncope, paralysis, seizures or tremors.  All other reviewed and negative other than HPI.    OBJECTIVE:    /83   Pulse 83   Temp 96.6 °F (35.9 °C)   Resp 18   Ht 5' 2" (1.575 m)   Wt 83 kg (183 lb)   BMI 33.47 kg/m²     PHYSICAL EXAMINATION:    General appearance: Well appearing, in no acute distress, alert and oriented x3.  Psych:  Mood and affect appropriate.  Skin: Skin color, texture, turgor normal, no rashes or lesions, in both upper and lower body.  Head/face:  Normocephalic, atraumatic. No palpable lymph nodes.  Neck: +ve pain to palpation over the cervical paraspinous muscles. Spurling Negative. No pain with neck flexion, extension, or lateral flexion.   Cor: RRR  Pulm: CTA  GI:  Soft and non-tender.  Back: Straight leg raising in the sitting and supine positions is negative to radicular pain. + pain to palpation over the spine or costovertebral angles. Normal range of motion without pain reproduction.  Extremities: Peripheral joint ROM is full and pain free without obvious instability or laxity in all four extremities. No deformities, edema, or skin discoloration. Good capillary refill.  Musculoskeletal: Shoulder, hip, sacroiliac and knee provocative maneuvers are negative. Bilateral upper and lower extremity strength is normal and symmetric.  No atrophy or tone abnormalities are noted. +TTP in multiple locations and cervical and lumbar paraspinal  Neuro: Bilateral upper and lower extremity coordination and muscle stretch reflexes are physiologic and symmetric.  Plantar response are downgoing. No loss of sensation is noted.  Gait: normal.    ASSESSMENT: 54 y.o. year old female with chronic diffuse pain, consistent with      1. Fibromyalgia  [facility]    Ambulatory referral to " Psychiatry    Ambulatory referral to Physical Therapy    DULoxetine (CYMBALTA) 30 MG capsule    Ambulatory consult to Rheumatology   2. Myofascial pain  [facility] referral to [facility]    Ambulatory referral to Psychiatry    Ambulatory referral to Physical Therapy    DULoxetine (CYMBALTA) 30 MG capsule    Ambulatory consult to Rheumatology   3. Chronic pain disorder           PLAN:     - I have stressed the importance of physical activity and a home exercise plan to help with pain and improve health.  - Patient can continue with medications for now since they are providing benefits, using them appropriately, and without side effects.  - Will start cymbalta 30mg BID daily  - continue lyrica 75mg daily  - Continue ibuprofen 800mg prn max TID  - Will refer to PT, rheumatology for follow up on [facility] and  Psychiatry for cognitive behavior therapy and biofeedback  - Counseled patient regarding the importance of activity modification, constant sleeping habits and physical therapy.      The above plan and management options were discussed at length with patient. Patient is in agreement with the above and verbalized understanding. It will be communicated with the referring physician via electronic record, fax, or mail.    Attending physician was present throughout the entire case and all critical portions.    Joselo Bradley MD    05/02/2019

## 2019-05-07 ENCOUNTER — TELEPHONE (OUTPATIENT)
Dept: ADMINISTRATIVE | Facility: OTHER | Age: 54
End: 2019-05-07

## 2019-05-14 ENCOUNTER — OFFICE VISIT (OUTPATIENT)
Dept: URGENT CARE | Facility: CLINIC | Age: 54
End: 2019-05-14
Payer: COMMERCIAL

## 2019-05-14 VITALS
TEMPERATURE: 99 F | OXYGEN SATURATION: 97 % | SYSTOLIC BLOOD PRESSURE: 126 MMHG | DIASTOLIC BLOOD PRESSURE: 60 MMHG | HEIGHT: 62 IN | RESPIRATION RATE: 18 BRPM | BODY MASS INDEX: 33.68 KG/M2 | WEIGHT: 183 LBS | HEART RATE: 87 BPM

## 2019-05-14 DIAGNOSIS — M54.42 ACUTE LEFT-SIDED LOW BACK PAIN WITH LEFT-SIDED SCIATICA: Primary | ICD-10-CM

## 2019-05-14 PROCEDURE — 3008F BODY MASS INDEX DOCD: CPT | Mod: CPTII,S$GLB,, | Performed by: NURSE PRACTITIONER

## 2019-05-14 PROCEDURE — 99214 OFFICE O/P EST MOD 30 MIN: CPT | Mod: 25,S$GLB,, | Performed by: NURSE PRACTITIONER

## 2019-05-14 PROCEDURE — 3008F PR BODY MASS INDEX (BMI) DOCUMENTED: ICD-10-PCS | Mod: CPTII,S$GLB,, | Performed by: NURSE PRACTITIONER

## 2019-05-14 PROCEDURE — 96372 PR INJECTION,THERAP/PROPH/DIAG2ST, IM OR SUBCUT: ICD-10-PCS | Mod: S$GLB,,, | Performed by: NURSE PRACTITIONER

## 2019-05-14 PROCEDURE — 96372 THER/PROPH/DIAG INJ SC/IM: CPT | Mod: S$GLB,,, | Performed by: NURSE PRACTITIONER

## 2019-05-14 PROCEDURE — 99214 PR OFFICE/OUTPT VISIT, EST, LEVL IV, 30-39 MIN: ICD-10-PCS | Mod: 25,S$GLB,, | Performed by: NURSE PRACTITIONER

## 2019-05-14 RX ORDER — METHOCARBAMOL 500 MG/1
500 TABLET, FILM COATED ORAL 4 TIMES DAILY
Qty: 28 TABLET | Refills: 0 | Status: SHIPPED | OUTPATIENT
Start: 2019-05-14 | End: 2019-05-21

## 2019-05-14 RX ORDER — METHYLPREDNISOLONE 4 MG/1
TABLET ORAL
Qty: 1 PACKAGE | Refills: 0 | Status: SHIPPED | OUTPATIENT
Start: 2019-05-14 | End: 2022-11-22

## 2019-05-14 RX ORDER — KETOROLAC TROMETHAMINE 30 MG/ML
30 INJECTION, SOLUTION INTRAMUSCULAR; INTRAVENOUS
Status: COMPLETED | OUTPATIENT
Start: 2019-05-14 | End: 2019-05-14

## 2019-05-14 RX ADMIN — KETOROLAC TROMETHAMINE 30 MG: 30 INJECTION, SOLUTION INTRAMUSCULAR; INTRAVENOUS at 06:05

## 2019-05-14 NOTE — PROGRESS NOTES
"Subjective:       Patient ID: Amber Khan is a 54 y.o. female.    Vitals:  height is 5' 2" (1.575 m) and weight is 83 kg (183 lb). Her temperature is 99.3 °F (37.4 °C). Her blood pressure is 126/60 and her pulse is 87. Her respiration is 18 and oxygen saturation is 97%.     Chief Complaint: Back Pain    Left leg pain and back pain for 3 days. Patient denies trauma     Back Pain   This is a new problem. The current episode started in the past 7 days. The problem occurs constantly. The problem is unchanged. The pain is present in the lumbar spine. The quality of the pain is described as shooting. The pain radiates to the left thigh. The pain is at a severity of 7/10. The pain is severe. The pain is the same all the time. The symptoms are aggravated by position. Stiffness is present all day. She has tried NSAIDs (Ibuprofen ) for the symptoms. The treatment provided mild relief.       Constitution: Negative for chills and fatigue.   HENT: Negative for congestion and sore throat.    Neck: Negative for painful lymph nodes.   Cardiovascular: Negative for leg swelling.   Eyes: Negative for double vision and blurred vision.   Respiratory: Negative for cough and shortness of breath.    Gastrointestinal: Negative for nausea, vomiting and diarrhea.   Genitourinary: Negative for frequency, urgency and history of kidney stones.   Musculoskeletal: Positive for joint pain and back pain. Negative for joint swelling, muscle cramps and muscle ache.   Skin: Negative for color change, pale, rash and bruising.   Allergic/Immunologic: Negative for seasonal allergies.   Neurological: Negative for dizziness, history of vertigo, light-headedness and passing out.   Hematologic/Lymphatic: Negative for swollen lymph nodes.   Psychiatric/Behavioral: Negative for nervous/anxious, sleep disturbance and depression. The patient is not nervous/anxious.        Objective:      Physical Exam   Constitutional: She is oriented to person, place, and " time. She appears well-developed and well-nourished. She is cooperative.  Non-toxic appearance. She does not appear ill. No distress.   HENT:   Head: Normocephalic and atraumatic.   Right Ear: Hearing, tympanic membrane, external ear and ear canal normal.   Left Ear: Hearing, tympanic membrane, external ear and ear canal normal.   Nose: Nose normal. No mucosal edema, rhinorrhea or nasal deformity. No epistaxis. Right sinus exhibits no maxillary sinus tenderness and no frontal sinus tenderness. Left sinus exhibits no maxillary sinus tenderness and no frontal sinus tenderness.   Mouth/Throat: Uvula is midline, oropharynx is clear and moist and mucous membranes are normal. No trismus in the jaw. Normal dentition. No uvula swelling. No posterior oropharyngeal erythema.   Eyes: Conjunctivae and lids are normal. Right eye exhibits no discharge. Left eye exhibits no discharge. No scleral icterus.   Sclera clear bilat   Neck: Trachea normal, normal range of motion, full passive range of motion without pain and phonation normal. Neck supple.   Cardiovascular: Normal rate, regular rhythm, normal heart sounds, intact distal pulses and normal pulses.   Pulmonary/Chest: Effort normal and breath sounds normal. No respiratory distress.   Abdominal: Soft. Normal appearance and bowel sounds are normal. She exhibits no distension, no pulsatile midline mass and no mass. There is no tenderness.   Musculoskeletal: She exhibits no edema or deformity.        Lumbar back: She exhibits decreased range of motion, tenderness and spasm.        Arms:  Negative straight leg raise   Full ROM BLE with 5/5 strength   2+ DTR patella and achilles   NVIT distally with sensation intact to light touch  Not ambulate with steady gait. Patient favoring left side.    Neurological: She is alert and oriented to person, place, and time. She exhibits normal muscle tone. Coordination normal.   Skin: Skin is warm, dry and intact. She is not diaphoretic. No  pallor.   Psychiatric: She has a normal mood and affect. Her speech is normal and behavior is normal. Judgment and thought content normal. Cognition and memory are normal.   Nursing note and vitals reviewed.      Assessment:       1. Acute left-sided low back pain with left-sided sciatica        Plan:         Acute left-sided low back pain with left-sided sciatica  -     ketorolac injection 30 mg  -     methylPREDNISolone (MEDROL DOSEPACK) 4 mg tablet; use as directed  Dispense: 1 Package; Refill: 0  -     methocarbamol (ROBAXIN) 500 MG Tab; Take 1 tablet (500 mg total) by mouth 4 (four) times daily. for 7 days  Dispense: 28 tablet; Refill: 0      Patient Instructions     Patient educated to take Tylenol and ibuprofen as needed for pain.  Patient to take muscle relaxer as needed for breakthrough pain.  Steroid pack given for inflammation.  Patient alternate ice and heat as needed for comfort.     If symptoms persist or worsen patient needs to follow up with primary.          Relieving Back Pain  Back pain is a common problem. You can strain back muscles by lifting too much weight or just by moving the wrong way. Back strain can be uncomfortable, even painful. And it can take weeks or months to improve. To help yourself feel better and prevent future back strains, try these tips.  Important Note: Do not give aspirin to children or teens without first discussing it with your healthcare provider.      ? Ice    Ice reduces muscle pain and swelling. It helps most during the first 24 to 48 hours after an injury.  · Wrap an ice pack or a bag of frozen peas in a thin towel. (Never place ice directly on your skin.)  · Place the ice where your back hurts the most.  · Dont ice for more than 20 minutes at a time.  · You can use ice several times a day.  ? Medicines  Over-the-counter pain relievers can include acetaminophen and anti-inflammatory medicines, which includes aspirin or ibuprofen. They can help ease discomfort. Some  also reduce swelling.  · Tell your healthcare provider about any medicines you are already taking.  · Take medicines only as directed.  ? Heat  After the first 48 hours, heat can relax sore muscles and improve blood flow.  · Try a warm bath or shower. Or use a heating pad set on low. To prevent a burn, keep a cloth between you and the heating pad.  · Dont use a heating pad for more than 15 minutes at a time. Never sleep on a heating pad.  Date Last Reviewed: 9/1/2015  © 7134-5064 [employer]. [address] [place] [address]. All rights reserved. This information is not intended as a substitute for professional medical care. Always follow your healthcare professional's instructions.

## 2019-05-14 NOTE — PATIENT INSTRUCTIONS
Patient educated to take Tylenol and ibuprofen as needed for pain.  Patient to take muscle relaxer as needed for breakthrough pain.  Steroid pack given for inflammation.  Patient alternate ice and heat as needed for comfort.     If symptoms persist or worsen patient needs to follow up with primary.          Relieving Back Pain  Back pain is a common problem. You can strain back muscles by lifting too much weight or just by moving the wrong way. Back strain can be uncomfortable, even painful. And it can take weeks or months to improve. To help yourself feel better and prevent future back strains, try these tips.  Important Note: Do not give aspirin to children or teens without first discussing it with your healthcare provider.      ? Ice    Ice reduces muscle pain and swelling. It helps most during the first 24 to 48 hours after an injury.  · Wrap an ice pack or a bag of frozen peas in a thin towel. (Never place ice directly on your skin.)  · Place the ice where your back hurts the most.  · Dont ice for more than 20 minutes at a time.  · You can use ice several times a day.  ? Medicines  Over-the-counter pain relievers can include acetaminophen and anti-inflammatory medicines, which includes aspirin or ibuprofen. They can help ease discomfort. Some also reduce swelling.  · Tell your healthcare provider about any medicines you are already taking.  · Take medicines only as directed.  ? Heat  After the first 48 hours, heat can relax sore muscles and improve blood flow.  · Try a warm bath or shower. Or use a heating pad set on low. To prevent a burn, keep a cloth between you and the heating pad.  · Dont use a heating pad for more than 15 minutes at a time. Never sleep on a heating pad.  Date Last Reviewed: 9/1/2015  © 5823-3503 [employer]. [address] [place] [address], [place] [address]. All rights reserved. This information is not intended as a substitute for professional medical care. Always follow your  healthcare professional's instructions.

## 2019-06-21 DIAGNOSIS — M79.18 MYOFASCIAL PAIN: ICD-10-CM

## 2019-06-21 DIAGNOSIS — M79.7 FIBROMYALGIA: ICD-10-CM

## 2019-06-23 RX ORDER — DULOXETIN HYDROCHLORIDE 30 MG/1
30 CAPSULE, DELAYED RELEASE ORAL 2 TIMES DAILY
Qty: 60 CAPSULE | Refills: 1 | Status: SHIPPED | OUTPATIENT
Start: 2019-06-23 | End: 2019-07-17 | Stop reason: SDUPTHER

## 2019-07-17 DIAGNOSIS — M79.18 MYOFASCIAL PAIN: ICD-10-CM

## 2019-07-17 DIAGNOSIS — M79.7 FIBROMYALGIA: ICD-10-CM

## 2019-07-17 RX ORDER — DULOXETIN HYDROCHLORIDE 30 MG/1
30 CAPSULE, DELAYED RELEASE ORAL 2 TIMES DAILY
Qty: 60 CAPSULE | Refills: 1 | Status: SHIPPED | OUTPATIENT
Start: 2019-07-17 | End: 2020-01-23 | Stop reason: SDUPTHER

## 2019-07-17 NOTE — TELEPHONE ENCOUNTER
----- Message from Ozzy Cueto sent at 7/17/2019 11:07 AM CDT -----  Contact: JUDY ([facility])      Can the clinic reply in [facility]:       Please refill the medication(s) listed below. Please call the patient when the prescription(s) is ready for  at this phone number       Medication #1 DULoxetine (CYMBALTA) 30 MG capsule (90 days supply requested)      [facility] [id number] [place] [facility] [address]

## 2019-07-17 NOTE — TELEPHONE ENCOUNTER
----- Message from Ozzy Cueto sent at 7/17/2019 11:07 AM CDT -----  Contact: JUDY ([facility])      Can the clinic reply in [facility]:       Please refill the medication(s) listed below. Please call the patient when the prescription(s) is ready for  at this phone number       Medication #1 DULoxetine (CYMBALTA) 30 MG capsule (90 days supply requested)      [facility] [id number] [place] [facility] [address]

## 2019-11-04 ENCOUNTER — OFFICE VISIT (OUTPATIENT)
Dept: URGENT CARE | Facility: CLINIC | Age: 54
End: 2019-11-04
Payer: COMMERCIAL

## 2019-11-04 VITALS
BODY MASS INDEX: 33.68 KG/M2 | WEIGHT: 183 LBS | DIASTOLIC BLOOD PRESSURE: 83 MMHG | SYSTOLIC BLOOD PRESSURE: 138 MMHG | RESPIRATION RATE: 10 BRPM | TEMPERATURE: 98 F | HEIGHT: 62 IN | OXYGEN SATURATION: 98 % | HEART RATE: 72 BPM
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
## 2019-11-04 DIAGNOSIS — M75.82 TENDINITIS OF LEFT ROTATOR CUFF: Primary | ICD-10-CM

## 2019-11-04 PROCEDURE — 3008F PR BODY MASS INDEX (BMI) DOCUMENTED: ICD-10-PCS | Mod: CPTII,S$GLB,, | Performed by: NURSE PRACTITIONER

## 2019-11-04 PROCEDURE — 96372 THER/PROPH/DIAG INJ SC/IM: CPT | Mod: S$GLB,,, | Performed by: NURSE PRACTITIONER

## 2019-11-04 PROCEDURE — 99214 PR OFFICE/OUTPT VISIT, EST, LEVL IV, 30-39 MIN: ICD-10-PCS | Mod: 25,S$GLB,, | Performed by: NURSE PRACTITIONER

## 2019-11-04 PROCEDURE — 96372 PR INJECTION,THERAP/PROPH/DIAG2ST, IM OR SUBCUT: ICD-10-PCS | Mod: S$GLB,,, | Performed by: NURSE PRACTITIONER

## 2019-11-04 PROCEDURE — 3008F BODY MASS INDEX DOCD: CPT | Mod: CPTII,S$GLB,, | Performed by: NURSE PRACTITIONER

## 2019-11-04 PROCEDURE — 99214 OFFICE O/P EST MOD 30 MIN: CPT | Mod: 25,S$GLB,, | Performed by: NURSE PRACTITIONER

## 2019-11-04 RX ORDER — KETOROLAC TROMETHAMINE 30 MG/ML
30 INJECTION, SOLUTION INTRAMUSCULAR; INTRAVENOUS
Status: COMPLETED | OUTPATIENT
Start: 2019-11-04 | End: 2019-11-04

## 2019-11-04 RX ORDER — METHYLPREDNISOLONE 4 MG/1
4 TABLET ORAL
Qty: 1 PACKAGE | Refills: 0 | Status: SHIPPED | OUTPATIENT
Start: 2019-11-04 | End: 2019-11-10

## 2019-11-04 RX ADMIN — KETOROLAC TROMETHAMINE 30 MG: 30 INJECTION, SOLUTION INTRAMUSCULAR; INTRAVENOUS at 05:11

## 2019-11-04 NOTE — PROGRESS NOTES
"Subjective:       Patient ID: Amber Khan is a 54 y.o. female.    Vitals:  height is 5' 2" (1.575 m) and weight is 83 kg (183 lb). Her oral temperature is 98.1 °F (36.7 °C). Her blood pressure is 138/83 and her pulse is 72. Her respiration is 10 and oxygen saturation is 98%.     Chief Complaint: Shoulder Pain (left shoulder )    Pt c/o left shoulder and upper left arm pain with limited ROM ; no known injury; x 2 wks.  Patient states she works at [employer] and uses her arms a lot.   No numbness or tingling.       Shoulder Pain    The pain is present in the left shoulder and left arm. This is a new problem. The current episode started 1 to 4 weeks ago. The problem occurs constantly. The problem has been gradually worsening. The quality of the pain is described as sharp (squeezing ). The pain is at a severity of 9/10. The pain is severe. Associated symptoms include stiffness and tingling. Pertinent negatives include no fever or headaches. She has tried NSAIDS and acetaminophen for the symptoms. The treatment provided no relief.       Constitution: Negative for chills, fatigue and fever.   HENT: Negative for congestion and sore throat.    Neck: Negative for painful lymph nodes.   Cardiovascular: Negative for chest pain and leg swelling.   Eyes: Negative for double vision and blurred vision.   Respiratory: Negative for cough and shortness of breath.    Gastrointestinal: Negative for nausea, vomiting and diarrhea.   Genitourinary: Negative for dysuria, frequency, urgency and history of kidney stones.   Musculoskeletal: Positive for joint pain (left shoulder ). Negative for joint swelling, muscle cramps and muscle ache.   Skin: Negative for color change, pale, rash, erythema and bruising.   Allergic/Immunologic: Negative for seasonal allergies.   Neurological: Negative for dizziness, history of vertigo, light-headedness, passing out and headaches.   Hematologic/Lymphatic: Negative for swollen lymph nodes. "   Psychiatric/Behavioral: Negative for nervous/anxious, sleep disturbance and depression. The patient is not nervous/anxious.        Objective:      Physical Exam   Constitutional: She is oriented to person, place, and time. She appears well-developed and well-nourished.   HENT:   Head: Normocephalic and atraumatic. Head is without abrasion, without contusion and without laceration.   Right Ear: External ear normal.   Left Ear: External ear normal.   Nose: Nose normal.   Mouth/Throat: Oropharynx is clear and moist and mucous membranes are normal.   Eyes: Pupils are equal, round, and reactive to light. Conjunctivae, EOM and lids are normal.   Neck: Trachea normal, full passive range of motion without pain and phonation normal. Neck supple.   Cardiovascular: Normal rate, regular rhythm and normal heart sounds.   Pulmonary/Chest: Effort normal and breath sounds normal. No stridor. No respiratory distress.   Musculoskeletal: Normal range of motion.        Left shoulder: She exhibits tenderness, bony tenderness and pain. She exhibits normal range of motion, no swelling, no effusion, no crepitus, no deformity, no laceration, no spasm, normal pulse and normal strength.   Able to fully abduct shoulder and adduct   Pain with abduction above head  Tenderness just over posterior aspect of left shoulder.   NVIT/SENSATION INTACT  PLACED IN SLING FOR COMFORT  RADIAL PULSE +2   Neurological: She is alert and oriented to person, place, and time.   Skin: Skin is warm, dry, intact and no rash. Capillary refill takes less than 2 seconds. abrasion, burn, bruising, erythema and ecchymosis  Psychiatric: She has a normal mood and affect. Her speech is normal and behavior is normal. Judgment and thought content normal. Cognition and memory are normal.   Nursing note and vitals reviewed.        Assessment:       1. Tendinitis of left rotator cuff    2. Overuse injury        Plan:         Tendinitis of left rotator cuff  -     ketorolac  injection 30 mg  -     methylPREDNISolone (MEDROL DOSEPACK) 4 mg tablet; Take 1 tablet (4 mg total) by mouth as needed (Take as directed). Take as directed  Dispense: 1 Package; Refill: 0  -     SLING FOR HOME USE    Overuse injury  -     ketorolac injection 30 mg            Patient Instructions       You must understand that you've received an Urgent Care treatment only and that you may be released before all your medical problems are known or treated. You, the patient, will arrange for follow up care as instructed.  If your condition worsens we recommend that you receive another evaluation at the emergency room immediately or contact your primary medical clinics after hours call service to discuss your concerns.  Please return here or go to the Emergency Department for any concerns or worsening of condition.        Understanding Rotator Cuff Tendonitis    Tendons are tough tissues that connect muscles to bone. A group of 4 muscles and their tendons form a cuff around the head of the upper arm bone. This is called the rotator cuff. It connects the upper arm to the shoulder blade. It gives the shoulder joint stability and strength.  If tendons are injured or strained, they may become irritated and swollen (inflamed). This is called tendonitis. Rotator cuff tendonitis may cause shoulder pain and loss of function.  What causes rotator cuff tendonitis?  Tendonitis results when the rotator cuff tendons are injured or overworked. The most common cause of injury is repetitive overhead activities. These can be work-related activities such as reaching, pushing, or lifting. Or they can be sports-related activities such as throwing, swimming, or lifting weights.  Symptoms of rotator cuff tendonitis  Pain on the side of the upper arm is the most common symptom. Pain may get worse with overhead movements or when you raise the arm above shoulder level. It may also hurt to lie on the shoulder at night.  Treatment for rotator  cuff tendonitis  Treatment may include the following:  · Active rest. This lets the rotator cuff heal. Active rest means using your arm and shoulder, but avoiding activities that cause pain, such as reaching overhead or sleeping on the shoulder.  · Cold packs. Putting ice packs on the shoulder helps reduce swelling and relieve pain.  · Pain medicines. Prescription or over-the-counter pain medicines can help relieve pain and swelling.  · Arm and shoulder exercises. These help keep the shoulder joint mobile as it heals. They also help improve the strength of muscles around the joint.  Possible complications  It might be tempting to stop using your shoulder completely to avoid pain. But doing so may lead to a condition called frozen shoulder. To help prevent this, following instructions you are given for active rest and for doing exercises to help your shoulder heal.  When to call your healthcare provider  Call your healthcare provider right away if you have any of these:  · Fever of 100.4°F (38°C) or higher, or as directed  · Symptoms that dont get better, or get worse  · New symptoms   Date Last Reviewed: 3/10/2016  © 7087-2232 [employer]. [address] [place] [address], [place] [address]. All rights reserved. This information is not intended as a substitute for professional medical care. Always follow your healthcare professional's instructions.        \

## 2019-11-04 NOTE — PATIENT INSTRUCTIONS
You must understand that you've received an Urgent Care treatment only and that you may be released before all your medical problems are known or treated. You, the patient, will arrange for follow up care as instructed.  If your condition worsens we recommend that you receive another evaluation at the emergency room immediately or contact your primary medical clinics after hours call service to discuss your concerns.  Please return here or go to the Emergency Department for any concerns or worsening of condition.        Understanding Rotator Cuff Tendonitis    Tendons are tough tissues that connect muscles to bone. A group of 4 muscles and their tendons form a cuff around the head of the upper arm bone. This is called the rotator cuff. It connects the upper arm to the shoulder blade. It gives the shoulder joint stability and strength.  If tendons are injured or strained, they may become irritated and swollen (inflamed). This is called tendonitis. Rotator cuff tendonitis may cause shoulder pain and loss of function.  What causes rotator cuff tendonitis?  Tendonitis results when the rotator cuff tendons are injured or overworked. The most common cause of injury is repetitive overhead activities. These can be work-related activities such as reaching, pushing, or lifting. Or they can be sports-related activities such as throwing, swimming, or lifting weights.  Symptoms of rotator cuff tendonitis  Pain on the side of the upper arm is the most common symptom. Pain may get worse with overhead movements or when you raise the arm above shoulder level. It may also hurt to lie on the shoulder at night.  Treatment for rotator cuff tendonitis  Treatment may include the following:  · Active rest. This lets the rotator cuff heal. Active rest means using your arm and shoulder, but avoiding activities that cause pain, such as reaching overhead or sleeping on the shoulder.  · Cold packs. Putting ice packs on the shoulder helps  reduce swelling and relieve pain.  · Pain medicines. Prescription or over-the-counter pain medicines can help relieve pain and swelling.  · Arm and shoulder exercises. These help keep the shoulder joint mobile as it heals. They also help improve the strength of muscles around the joint.  Possible complications  It might be tempting to stop using your shoulder completely to avoid pain. But doing so may lead to a condition called frozen shoulder. To help prevent this, following instructions you are given for active rest and for doing exercises to help your shoulder heal.  When to call your healthcare provider  Call your healthcare provider right away if you have any of these:  · Fever of 100.4°F (38°C) or higher, or as directed  · Symptoms that dont get better, or get worse  · New symptoms   Date Last Reviewed: 3/10/2016  © 0643-7382 [employer]. [address] [place] [address], [place] [address]. All rights reserved. This information is not intended as a substitute for professional medical care. Always follow your healthcare professional's instructions.

## 2019-11-07 DIAGNOSIS — M25.512 LEFT SHOULDER PAIN: Primary | ICD-10-CM

## 2019-11-16 ENCOUNTER — OFFICE VISIT (OUTPATIENT)
Dept: URGENT CARE | Facility: CLINIC | Age: 54
End: 2019-11-16
Payer: COMMERCIAL

## 2019-11-16 VITALS
BODY MASS INDEX: 33.68 KG/M2 | WEIGHT: 183 LBS | HEIGHT: 62 IN | SYSTOLIC BLOOD PRESSURE: 130 MMHG | HEART RATE: 75 BPM | TEMPERATURE: 98 F | OXYGEN SATURATION: 98 % | RESPIRATION RATE: 18 BRPM | DIASTOLIC BLOOD PRESSURE: 76 MMHG

## 2019-11-16 DIAGNOSIS — L03.011 PARONYCHIA OF RIGHT INDEX FINGER: Primary | ICD-10-CM

## 2019-11-16 PROCEDURE — 3008F BODY MASS INDEX DOCD: CPT | Mod: CPTII,S$GLB,, | Performed by: NURSE PRACTITIONER

## 2019-11-16 PROCEDURE — 26010 INCISION & DRAINAGE: ICD-10-PCS | Mod: S$GLB,,, | Performed by: NURSE PRACTITIONER

## 2019-11-16 PROCEDURE — 26010 DRAINAGE OF FINGER ABSCESS: CPT | Mod: S$GLB,,, | Performed by: NURSE PRACTITIONER

## 2019-11-16 PROCEDURE — 99214 PR OFFICE/OUTPT VISIT, EST, LEVL IV, 30-39 MIN: ICD-10-PCS | Mod: 25,S$GLB,, | Performed by: NURSE PRACTITIONER

## 2019-11-16 PROCEDURE — 99214 OFFICE O/P EST MOD 30 MIN: CPT | Mod: 25,S$GLB,, | Performed by: NURSE PRACTITIONER

## 2019-11-16 PROCEDURE — 3008F PR BODY MASS INDEX (BMI) DOCUMENTED: ICD-10-PCS | Mod: CPTII,S$GLB,, | Performed by: NURSE PRACTITIONER

## 2019-11-16 RX ORDER — SULFAMETHOXAZOLE AND TRIMETHOPRIM 800; 160 MG/1; MG/1
1 TABLET ORAL 2 TIMES DAILY
Qty: 14 TABLET | Refills: 0 | Status: SHIPPED | OUTPATIENT
Start: 2019-11-16 | End: 2019-11-23

## 2019-11-16 RX ORDER — MUPIROCIN 20 MG/G
OINTMENT TOPICAL
Qty: 22 G | Refills: 1 | Status: SHIPPED | OUTPATIENT
Start: 2019-11-16 | End: 2022-11-22

## 2019-11-16 NOTE — PROCEDURES
"Incision & Drainage  Date/Time: 11/16/2019 10:15 AM  Performed by: Anisa Starkey NP  Authorized by: Anisa Starkey NP     Time out: Immediately prior to procedure a "time out" was called to verify the correct patient, procedure, equipment, support staff and site/side marked as required.      Type:  Abscess (Paronychia)  Body area:  Upper extremity  Location details:  Right index finger  Anesthesia:  Digital block  Anesthetic total (ml):  2  Scalpel size: 18g.  Complexity:  Simple  Drainage:  Pus  Drainage amount:  Scant  Wound treatment:  Wound left open  Patient tolerance:  Patient tolerated the procedure well with no immediate complications      "

## 2019-11-16 NOTE — PATIENT INSTRUCTIONS
Perform warm soaks as much as possible.  Apply antibiotic ointment twice daily.  Keep wound covered.     take antibiotics until completion.    Alternate Tylenol and ibuprofen as needed for pain.  Follow-up with PCP if symptoms persist or worsen.        Paronychia of the Finger or Toe  Paronychia is an infection near a fingernail or toenail. It usually occurs when an opening in the cuticle or an ingrown toenail lets bacteria under the skin.  The infection will need to be drained if pus is present. If the infection has been caught early, you may need only antibiotic treatment. Healing will take about 1 to 2 weeks.  Home care  Follow these guidelines when caring for yourself at home:  · Clean and soak the toe or finger. Do this 2 times a day for the first 3 days. To do so:  ¨ Soak your foot or hand in a tub of warm water for 5 minutes. Or hold your toe or finger under a faucet of warm running water for 5 minutes.  ¨ Clean any crust away with soap and water using a cotton swab.  ¨ Put antibiotic ointment on the infected area.  · Change the dressing daily or any time it gets dirty.  · If you were given antibiotics, take them as directed until they are all gone.  · If your infection is on a toe, wear comfortable shoes with a lot of toe room. You can also wear open-toed sandals while your toe heals.  · You may use over-the-counter medicine (acetaminophen or ibuprofen to help with pain, unless another medicine was prescribed. If you have chronic liver or kidney disease, talk with your healthcare provider before using these medicines. Also talk with your provider if you've had a stomach ulcer or GI (gastrointestinal) bleeding.  Prevention  The following can prevent paronychia:  · Avoid cutting or playing with your cuticles at home.  · Don't bite your nails.  · Don't suck on your thumbs or fingers.  Follow-up care  Follow up with your healthcare provider, or as advised.  When to seek medical advice  Call your healthcare  provider right away if any of these occur:  · Redness, pain, or swelling of the finger or toe gets worse  · Red streaks in the skin leading away from the wound  · Pus or fluid draining from the nail area  · Fever of 100.4ºF (38ºC) or higher, or as directed by your provider  Date Last Reviewed: 8/1/2016  © 5066-7853 [employer]. [address] [place] [address]. All rights reserved. This information is not intended as a substitute for professional medical care. Always follow your healthcare professional's instructions.

## 2019-11-16 NOTE — PROGRESS NOTES
"Subjective:       Patient ID: Amber Khan is a 54 y.o. female.    Vitals:  height is 5' 2" (1.575 m) and weight is 83 kg (182 lb 15.7 oz). Her temperature is 98.4 °F (36.9 °C). Her blood pressure is 130/76 and her pulse is 75. Her respiration is 18 and oxygen saturation is 98%.     Chief Complaint: Hand Pain    Right index finger pain and swelling for 1 week. Something may have gotten stuck in finger while cleaning the tub     Hand Pain    The incident occurred 5 to 7 days ago. The incident occurred at home. Pain location: Right index finger  The quality of the pain is described as aching. The pain does not radiate. Pertinent negatives include no chest pain.       Constitution: Negative for chills, fatigue and fever.   HENT: Negative for congestion and sore throat.    Neck: Negative for painful lymph nodes.   Cardiovascular: Negative for chest pain and leg swelling.   Eyes: Negative for double vision and blurred vision.   Respiratory: Negative for cough and shortness of breath.    Gastrointestinal: Negative for nausea, vomiting and diarrhea.   Genitourinary: Negative for dysuria, frequency, urgency and history of kidney stones.   Musculoskeletal: Positive for joint pain and joint swelling. Negative for muscle cramps and muscle ache.   Skin: Negative for color change, pale, rash and bruising.   Allergic/Immunologic: Negative for seasonal allergies.   Neurological: Negative for dizziness, history of vertigo, light-headedness, passing out and headaches.   Hematologic/Lymphatic: Negative for swollen lymph nodes.   Psychiatric/Behavioral: Negative for nervous/anxious, sleep disturbance and depression. The patient is not nervous/anxious.        Objective:      Physical Exam   Constitutional: She is oriented to person, place, and time. She appears well-developed and well-nourished. She is cooperative.  Non-toxic appearance. She does not appear ill. No distress.   HENT:   Head: Normocephalic and atraumatic.   Right Ear: " Hearing, tympanic membrane, external ear and ear canal normal.   Left Ear: Hearing, tympanic membrane, external ear and ear canal normal.   Nose: Nose normal. No mucosal edema, rhinorrhea or nasal deformity. No epistaxis. Right sinus exhibits no maxillary sinus tenderness and no frontal sinus tenderness. Left sinus exhibits no maxillary sinus tenderness and no frontal sinus tenderness.   Mouth/Throat: Uvula is midline, oropharynx is clear and moist and mucous membranes are normal. No trismus in the jaw. Normal dentition. No uvula swelling. No posterior oropharyngeal erythema.   Eyes: Conjunctivae and lids are normal. Right eye exhibits no discharge. Left eye exhibits no discharge. No scleral icterus.   Neck: Trachea normal, normal range of motion, full passive range of motion without pain and phonation normal. Neck supple.   Cardiovascular: Normal rate, regular rhythm, normal heart sounds, intact distal pulses and normal pulses.   Pulmonary/Chest: Effort normal and breath sounds normal. No respiratory distress.   Abdominal: Soft. Normal appearance and bowel sounds are normal. She exhibits no distension, no pulsatile midline mass and no mass. There is no tenderness.   Musculoskeletal: Normal range of motion. She exhibits no edema or deformity.        Hands:  Neurological: She is alert and oriented to person, place, and time. She exhibits normal muscle tone. Coordination normal.   Skin: Skin is warm, dry, intact, not diaphoretic and not pale.   Psychiatric: She has a normal mood and affect. Her speech is normal and behavior is normal. Judgment and thought content normal. Cognition and memory are normal.   Nursing note and vitals reviewed.        Assessment:       1. Paronychia of right index finger        Plan:         Paronychia of right index finger  -     mupirocin (BACTROBAN) 2 % ointment; Apply to affected area 3 times daily  Dispense: 22 g; Refill: 1  -     sulfamethoxazole-trimethoprim 800-160mg (BACTRIM DS)  800-160 mg Tab; Take 1 tablet by mouth 2 (two) times daily. for 7 days  Dispense: 14 tablet; Refill: 0  -     Incision & Drainage      Patient Instructions    Perform warm soaks as much as possible.  Apply antibiotic ointment twice daily.  Keep wound covered.     take antibiotics until completion.    Alternate Tylenol and ibuprofen as needed for pain.  Follow-up with PCP if symptoms persist or worsen.        Paronychia of the Finger or Toe  Paronychia is an infection near a fingernail or toenail. It usually occurs when an opening in the cuticle or an ingrown toenail lets bacteria under the skin.  The infection will need to be drained if pus is present. If the infection has been caught early, you may need only antibiotic treatment. Healing will take about 1 to 2 weeks.  Home care  Follow these guidelines when caring for yourself at home:  · Clean and soak the toe or finger. Do this 2 times a day for the first 3 days. To do so:  ¨ Soak your foot or hand in a tub of warm water for 5 minutes. Or hold your toe or finger under a faucet of warm running water for 5 minutes.  ¨ Clean any crust away with soap and water using a cotton swab.  ¨ Put antibiotic ointment on the infected area.  · Change the dressing daily or any time it gets dirty.  · If you were given antibiotics, take them as directed until they are all gone.  · If your infection is on a toe, wear comfortable shoes with a lot of toe room. You can also wear open-toed sandals while your toe heals.  · You may use over-the-counter medicine (acetaminophen or ibuprofen to help with pain, unless another medicine was prescribed. If you have chronic liver or kidney disease, talk with your healthcare provider before using these medicines. Also talk with your provider if you've had a stomach ulcer or GI (gastrointestinal) bleeding.  Prevention  The following can prevent paronychia:  · Avoid cutting or playing with your cuticles at home.  · Don't bite your nails.  · Don't  suck on your thumbs or fingers.  Follow-up care  Follow up with your healthcare provider, or as advised.  When to seek medical advice  Call your healthcare provider right away if any of these occur:  · Redness, pain, or swelling of the finger or toe gets worse  · Red streaks in the skin leading away from the wound  · Pus or fluid draining from the nail area  · Fever of 100.4ºF (38ºC) or higher, or as directed by your provider  Date Last Reviewed: 8/1/2016  © 7069-0706 [employer]. [address] [place] [address]. All rights reserved. This information is not intended as a substitute for professional medical care. Always follow your healthcare professional's instructions.

## 2019-11-17 ENCOUNTER — TELEPHONE (OUTPATIENT)
Dept: URGENT CARE | Facility: CLINIC | Age: 54
End: 2019-11-17

## 2020-01-23 DIAGNOSIS — M79.18 MYOFASCIAL PAIN: ICD-10-CM

## 2020-01-23 DIAGNOSIS — M79.7 FIBROMYALGIA: ICD-10-CM

## 2020-01-23 RX ORDER — DULOXETIN HYDROCHLORIDE 30 MG/1
30 CAPSULE, DELAYED RELEASE ORAL 2 TIMES DAILY
Qty: 180 CAPSULE | Refills: 0 | Status: SHIPPED | OUTPATIENT
Start: 2020-01-23 | End: 2022-11-22

## 2020-01-23 NOTE — TELEPHONE ENCOUNTER
staff spoke with pt pharmacy for clarification    pharmacy stated insurance will only approve 90 days supply, nothing under. Stated she needs 90 days, #[id number]

## 2020-01-23 NOTE — TELEPHONE ENCOUNTER
----- Message from Juilanna Brito sent at 1/23/2020 11:17 AM CST -----  Contact: [facility])  Please refill the medication(s) listed below Pharmacy states insurance wont cover 30 day supply on a 90 day supply and a prescription is needed for 90 days if possible in order for insurance to cover medication    Medication # 1 :DULoxetine (CYMBALTA) 30 MG capsule    Medication # 2 :    Please call the patient when the prescription(s) is ready for  :[phone]    Can the clinic reply in [facility] :No    Preferred Pharmacy : [facility] [id number] [place] [address]

## 2020-04-07 ENCOUNTER — OFFICE VISIT (OUTPATIENT)
Dept: URGENT CARE | Facility: CLINIC | Age: 55
End: 2020-04-07
Payer: COMMERCIAL

## 2020-04-07 VITALS
RESPIRATION RATE: 15 BRPM | WEIGHT: 175 LBS | HEART RATE: 108 BPM | DIASTOLIC BLOOD PRESSURE: 81 MMHG | BODY MASS INDEX: 32.2 KG/M2 | HEIGHT: 62 IN | SYSTOLIC BLOOD PRESSURE: 169 MMHG | TEMPERATURE: 98 F | OXYGEN SATURATION: 98 %

## 2020-04-07 DIAGNOSIS — M54.50 ACUTE BILATERAL LOW BACK PAIN WITHOUT SCIATICA: Primary | ICD-10-CM

## 2020-04-07 DIAGNOSIS — R82.998 LEUKOCYTES IN URINE: ICD-10-CM

## 2020-04-07 DIAGNOSIS — K59.00 CONSTIPATION, UNSPECIFIED CONSTIPATION TYPE: ICD-10-CM

## 2020-04-07 DIAGNOSIS — R10.30 LOWER ABDOMINAL PAIN: ICD-10-CM

## 2020-04-07 LAB
BILIRUB UR QL STRIP: NEGATIVE
GLUCOSE UR QL STRIP: NEGATIVE
KETONES UR QL STRIP: NEGATIVE
LEUKOCYTE ESTERASE UR QL STRIP: POSITIVE
PH, POC UA: 6 (ref 5–8)
POC BLOOD, URINE: NEGATIVE
POC NITRATES, URINE: NEGATIVE
PROT UR QL STRIP: NEGATIVE
SP GR UR STRIP: 1.01 (ref 1–1.03)
UROBILINOGEN UR STRIP-ACNC: ABNORMAL (ref 0.1–1.1)

## 2020-04-07 PROCEDURE — 96372 PR INJECTION,THERAP/PROPH/DIAG2ST, IM OR SUBCUT: ICD-10-PCS | Mod: S$GLB,,, | Performed by: PHYSICIAN ASSISTANT

## 2020-04-07 PROCEDURE — 96372 THER/PROPH/DIAG INJ SC/IM: CPT | Mod: S$GLB,,, | Performed by: PHYSICIAN ASSISTANT

## 2020-04-07 PROCEDURE — 87086 URINE CULTURE/COLONY COUNT: CPT

## 2020-04-07 PROCEDURE — 99214 PR OFFICE/OUTPT VISIT, EST, LEVL IV, 30-39 MIN: ICD-10-PCS | Mod: 25,S$GLB,, | Performed by: PHYSICIAN ASSISTANT

## 2020-04-07 PROCEDURE — 99214 OFFICE O/P EST MOD 30 MIN: CPT | Mod: 25,S$GLB,, | Performed by: PHYSICIAN ASSISTANT

## 2020-04-07 PROCEDURE — 81003 URINALYSIS AUTO W/O SCOPE: CPT | Mod: QW,S$GLB,, | Performed by: PHYSICIAN ASSISTANT

## 2020-04-07 PROCEDURE — 81003 POCT URINALYSIS, DIPSTICK, AUTOMATED, W/O SCOPE: ICD-10-PCS | Mod: QW,S$GLB,, | Performed by: PHYSICIAN ASSISTANT

## 2020-04-07 RX ORDER — AMLODIPINE BESYLATE 10 MG/1
1 TABLET ORAL
COMMUNITY
Start: 2019-11-24 | End: 2022-11-22

## 2020-04-07 RX ORDER — LISINOPRIL 20 MG/1
2 TABLET ORAL
COMMUNITY
Start: 2019-11-10 | End: 2023-01-19

## 2020-04-07 RX ORDER — DICLOFENAC SODIUM 50 MG/1
50 TABLET, DELAYED RELEASE ORAL 3 TIMES DAILY PRN
Qty: 21 TABLET | Refills: 0 | Status: SHIPPED | OUTPATIENT
Start: 2020-04-07 | End: 2020-04-14

## 2020-04-07 RX ORDER — LEVOTHYROXINE SODIUM 25 UG/1
1 TABLET ORAL
COMMUNITY
Start: 2019-11-10 | End: 2023-01-19 | Stop reason: SDUPTHER

## 2020-04-07 RX ORDER — ATORVASTATIN CALCIUM 20 MG/1
TABLET, FILM COATED ORAL
COMMUNITY

## 2020-04-07 RX ORDER — KETOROLAC TROMETHAMINE 30 MG/ML
30 INJECTION, SOLUTION INTRAMUSCULAR; INTRAVENOUS
Status: COMPLETED | OUTPATIENT
Start: 2020-04-07 | End: 2020-04-07

## 2020-04-07 RX ORDER — POLYETHYLENE GLYCOL 3350 17 G/17G
17 POWDER, FOR SOLUTION ORAL DAILY
Qty: 85 G | Refills: 0 | Status: SHIPPED | OUTPATIENT
Start: 2020-04-07 | End: 2020-04-12

## 2020-04-07 RX ORDER — BUDESONIDE AND FORMOTEROL FUMARATE DIHYDRATE 160; 4.5 UG/1; UG/1
AEROSOL RESPIRATORY (INHALATION)
COMMUNITY
End: 2022-11-22

## 2020-04-07 RX ORDER — PANTOPRAZOLE SODIUM 40 MG/1
40 TABLET, DELAYED RELEASE ORAL
COMMUNITY
Start: 2019-12-04 | End: 2022-11-22 | Stop reason: SDUPTHER

## 2020-04-07 RX ORDER — DICYCLOMINE HYDROCHLORIDE 20 MG/1
TABLET ORAL
COMMUNITY

## 2020-04-07 RX ORDER — LANSOPRAZOLE 30 MG/1
CAPSULE, DELAYED RELEASE ORAL
COMMUNITY
End: 2022-11-22

## 2020-04-07 RX ADMIN — KETOROLAC TROMETHAMINE 30 MG: 30 INJECTION, SOLUTION INTRAMUSCULAR; INTRAVENOUS at 07:04

## 2020-04-07 NOTE — PROGRESS NOTES
"Subjective:       Patient ID: Amber Khan is a 55 y.o. female.    Vitals:  height is 5' 2" (1.575 m) and weight is 79.4 kg (175 lb). Her temperature is 98.4 °F (36.9 °C). Her blood pressure is 169/81 (abnormal) and her pulse is 108. Her respiration is 15 and oxygen saturation is 98%.     Chief Complaint: Abdominal Pain and Back Pain    Pt presents with chief complaint of back pain and lower abdominal pain that began over 2 weeks ago. She denies known injury or truama. She states that bending, twisting, changes in position seem to aggravate it but she always feels the pain. She states that the pain sometimes starts in the lower back and wraps around to the lower abdomen. Sometimes she states that she has generalized abdominal cramping and pain. Other times she has pelvic pain on both sides, which is constant. She has a difficult time explaining her symptoms.  She is post menopausal. She denies vaginal bleeding or discharge. She denies any urinary symptoms. She denies dysuria, frequency, urgency, hematuria. No history of kidney stone or ovarian cyst. She does have a pcp and obgyn but they could not see her. She states that this past week she has felt constipated and bloated. She has a BM every 1-2 days. Last BM was today. No change in diet. She has taken bentyl prescribed by her PCP and states that this did not seem to help. She denies diarrhea, melena, hematochezia, nausea, or vomiting. She states that this has happened in the past but she was not evaluated for it and it went away on its own. She denies fever, chest pain, shortness of breath,  Previous abdominal surgeries include tubal ligation. No history of spine surgery. No LE numbness or pain. Patient is a poor historian and has a difficult time describing the pain.     Abdominal Pain   The current episode started 1 to 4 weeks ago. The onset quality is undetermined. The problem occurs constantly. The problem has been gradually worsening. The pain is located in " the suprapubic region, LLQ and RLQ. The pain is at a severity of 10/10. The pain is severe. The quality of the pain is aching. The abdominal pain radiates to the back, pelvis and suprapubic region. Associated symptoms include constipation. Pertinent negatives include no arthralgias, diarrhea, dysuria, fever, frequency, headaches, hematochezia, hematuria, myalgias, nausea or vomiting. She has tried acetaminophen (bentyl; ibuprofen, tylenol) for the symptoms. Her past medical history is significant for abdominal surgery. There is no history of colon cancer, Crohn's disease, gallstones, GERD, irritable bowel syndrome, pancreatitis, PUD or ulcerative colitis. Patient's medical history does not include kidney stones.   Back Pain   The current episode started 1 to 4 weeks ago. The problem occurs constantly. The problem has been gradually worsening since onset. The pain is present in the lumbar spine. The pain is at a severity of 10/10. The pain is severe. The pain is the same all the time. The symptoms are aggravated by bending, position and twisting. Associated symptoms include abdominal pain and pelvic pain. Pertinent negatives include no bladder incontinence, bowel incontinence, chest pain, dysuria, fever, headaches, numbness or weakness. The treatment provided no relief.       Constitution: Negative for chills, fatigue and fever.   HENT: Negative for congestion and sore throat.    Neck: Negative for painful lymph nodes.   Cardiovascular: Negative for chest pain and leg swelling.   Eyes: Negative for double vision and blurred vision.   Respiratory: Negative for cough and shortness of breath.    Gastrointestinal: Positive for abdominal pain, abdominal bloating, history of abdominal surgery and constipation. Negative for abdominal trauma, nausea, vomiting, diarrhea, bright red blood in stool, dark colored stools, rectal bleeding, rectal pain, hemorrhoids, heartburn and bowel incontinence.   Genitourinary: Positive for  pelvic pain. Negative for dysuria, frequency, urgency, urine decreased, flank pain, bladder incontinence, bed wetting, hematuria, history of kidney stones, ovarian cysts, genital trauma, vaginal pain, vaginal discharge, vaginal bleeding, vaginal odor, painful intercourse, genital sore and painful ejaculation.   Musculoskeletal: Positive for pain and back pain. Negative for joint pain, joint swelling, muscle cramps and muscle ache.   Skin: Negative for color change, pale, rash and bruising.   Allergic/Immunologic: Negative for seasonal allergies.   Neurological: Negative for dizziness, history of vertigo, light-headedness, passing out, facial drooping, speech difficulty, coordination disturbances, loss of balance, headaches, history of migraines, numbness and tingling.   Hematologic/Lymphatic: Negative for swollen lymph nodes.   Psychiatric/Behavioral: Negative for nervous/anxious, sleep disturbance and depression. The patient is not nervous/anxious.        Objective:      Physical Exam   Constitutional: She is oriented to person, place, and time. She appears well-developed and well-nourished. She is cooperative.  Non-toxic appearance. She does not have a sickly appearance. She does not appear ill. No distress.   Patient sitting comfortably in no acute distress.   HENT:   Head: Normocephalic and atraumatic.   Right Ear: Hearing, tympanic membrane, external ear and ear canal normal.   Left Ear: Hearing, tympanic membrane, external ear and ear canal normal.   Nose: Nose normal. No mucosal edema, rhinorrhea, purulent discharge or nasal deformity. No epistaxis. Right sinus exhibits no maxillary sinus tenderness and no frontal sinus tenderness. Left sinus exhibits no maxillary sinus tenderness and no frontal sinus tenderness.   Mouth/Throat: Uvula is midline, oropharynx is clear and moist and mucous membranes are normal. No trismus in the jaw. Normal dentition. No uvula swelling. No oropharyngeal exudate, posterior  oropharyngeal edema or posterior oropharyngeal erythema.   Eyes: Conjunctivae and lids are normal. No scleral icterus.   Neck: Trachea normal, full passive range of motion without pain and phonation normal. Neck supple. No neck rigidity. No edema and no erythema present.   Cardiovascular: Normal rate, regular rhythm, normal heart sounds, intact distal pulses and normal pulses.   Pulmonary/Chest: Effort normal and breath sounds normal. No respiratory distress. She has no decreased breath sounds. She has no rhonchi.   Abdominal: Soft. Normal appearance and bowel sounds are normal. She exhibits no distension, no abdominal bruit, no pulsatile midline mass and no mass. There is no hepatosplenomegaly. There is tenderness in the right lower quadrant and left lower quadrant. There is no rigidity, no rebound, no guarding, no CVA tenderness, no tenderness at McBurney's point and negative Galarza's sign. No hernia.       Musculoskeletal: Normal range of motion. She exhibits no edema or deformity.        Thoracic back: Normal.        Lumbar back: She exhibits tenderness, pain and spasm. She exhibits normal range of motion, no bony tenderness, no swelling, no edema, no deformity, no laceration and normal pulse.        Back:    Bilat knees with full ROM and 5/5 strength. LE sensation intact.    Neurological: She is alert and oriented to person, place, and time. She has normal strength. She exhibits normal muscle tone. Coordination normal.   Skin: Skin is warm, dry, intact, not diaphoretic and not pale.   Psychiatric: She has a normal mood and affect. Her speech is normal and behavior is normal. Judgment and thought content normal. Cognition and memory are normal.   Nursing note and vitals reviewed.            Results for orders placed or performed in visit on 04/07/20   POCT Urinalysis, Dipstick, Automated, W/O Scope   Result Value Ref Range    POC Blood, Urine Negative Negative    POC Bilirubin, Urine Negative Negative    POC  Urobilinogen, Urine norm 0.1 - 1.1    POC Ketones, Urine Negative Negative    POC Protein, Urine Negative Negative    POC Nitrates, Urine Negative Negative    POC Glucose, Urine Negative Negative    pH, UA 6.0 5 - 8    POC Specific Gravity, Urine 1.015 1.003 - 1.029    POC Leukocytes, Urine Positive (A) Negative      Patient with low back and lower abdominal/pelvic pain for over 2 weeks. Exam is rather benign.  I will do US pelvis to evaluate potential causes. It does seem muscular in nature based on my evaluation, however, I did discuss my broad differential to the patient. UA remarkable for 10 leukocytes, likely contamination, but will send for urine culture, will hold off on antibiotics in the absence of urinary symptoms. I have instructed close follow up and instructed to go to ER if she develops new or worsening symptoms. Patient agrees with plan and expresses understanding.     Assessment:       1. Acute bilateral low back pain without sciatica    2. Leukocytes in urine    3. Constipation, unspecified constipation type    4. Lower abdominal pain        Plan:         Acute bilateral low back pain without sciatica  -     ketorolac injection 30 mg  -     diclofenac (VOLTAREN) 50 MG EC tablet; Take 1 tablet (50 mg total) by mouth 3 (three) times daily as needed (pain).  Dispense: 21 tablet; Refill: 0    Leukocytes in urine  -     POCT Urinalysis, Dipstick, Automated, W/O Scope  -     Urine culture    Constipation, unspecified constipation type  -     Cancel: POCT Urinalysis, Dipstick, Manual, W/O Scope  -     polyethylene glycol (GLYCOLAX) 17 gram/dose powder; Take 17 g by mouth once daily. for 5 days  Dispense: 85 g; Refill: 0    Lower abdominal pain  -     US Pelvis Complete Non OB; Future; Expected date: 04/07/2020      Patient Instructions     - Rest.    - Drink plenty of fluids.      - take diclofenac as prescribed with food to help with pain and inflammation.  Do NOT take over the counter NSAIDs (aleve,  advil, ibuprofen) while taking this medication.  You can take Tylenol as directed along with this medication for pain relief.  -DO NOT TAKE THE PRESCRIBED DICLOFENAC UNTIL 8 HR FROM NOW BECAUSE YOU GOT A SHOT IN CLINIC TODAY.    -YOU CAN TAKE THE PRESCRIBED POLYETHYLENE GLYCOL AS DIRECTED DAILY FOR CONSTIPATION.  DRINK PLENTY OF FLUIDS WHILE TAKING THIS MEDICATION.    -we will call you when we get results from your pelvic ultrasound and urine culture.    - Follow up with your PCP or specialty clinic as directed in the next 1-2 weeks if not improved or as needed.  You can call [phone] to schedule an appointment with the appropriate provider.    - Go to the ER or seek medical attention immediately if you develop new or worsening symptoms.    - You must understand that you have received an Urgent Care treatment only and that you may be released before all of your medical problems are known or treated.   - You, the patient, will arrange for follow up care as instructed.   - If your condition worsens or fails to improve we recommend that you receive another evaluation at the ER immediately or contact your PCP to discuss your concerns or return here.       Elevated Blood Pressure  Your blood pressure was elevated during your visit to the urgent care.  It was not so high that immediate care was needed but it is recommended that you monitor your blood pressure over the next week or two to make sure that it is not staying elevated.  Please have your blood pressure taken 2-3 times daily at different times of the day.  Write all of those blood pressures down and record the time that they were taken.  Keep all that information and take it with you to see your Primary Care Physician.  If your blood pressure is consistently above 140/90 you will need to follow up with your PCP more quickly      Self-Care for Low Back Pain    Most people have low back pain now and then. In many cases, it isnt serious and self-care can  help. Sometimes low back pain can be a sign of a bigger problem. Call your healthcare provider if your pain returns often or gets worse over time. For the long-term care of your back, get regular exercise, lose any excess weight and learn good posture.  Take a short rest  Lying down during the day may be beneficial for short periods of time if severe pain increases with sitting or standing. Long-term bed rest could be detrimental.  Reduce pain and swelling  Cold reduces swelling. Both cold and heat can reduce pain. Protect your skin by placing a towel between your body and the ice or heat source.  · For the first few days, apply an ice pack for 15 to 20 minutes .  · After the first few days, try heat for 15 minutes at a time to ease pain. Never sleep on a heating pad.  · Over-the-counter medicine can help control pain and swelling. Try aspirin or ibuprofen.  Exercise  Exercise can help your back heal. It also helps your back get stronger and more flexible, preventing any reinjury. Ask your healthcare provider about specific exercises for your back.  Use good posture to avoid reinjury  · When moving, bend at the hips and knees. Dont bend at the waist or twist around.  · When lifting, keep the object close to your body. Dont try to lift more than you can handle.  · When sitting, keep your lower back supported. Use a rolled-up towel as needed.  Seek immediate medical care if:  · Youre unable to stand or walk.  · You have a temperature over 100.4°F (38.0°C)  · You have frequent, painful, or bloody urination.  · You have severe abdominal pain.  · You have a sharp, stabbing pain.  · Your pain is constant.  · You have pain or numbness in your leg.  · You feel pain in a new area of your back.  · You notice that the pain isnt decreasing after more than a week.   Date Last Reviewed: 9/29/2015  © 0344-8303 [employer]. [address] [place] [address], [place] [address]. All rights reserved. This information is not  intended as a substitute for professional medical care. Always follow your healthcare professional's instructions.        Relieving Back Pain  Back pain is a common problem. You can strain back muscles by lifting too much weight or just by moving the wrong way. Back strain can be uncomfortable, even painful. And it can take weeks or months to improve. To help yourself feel better and prevent future back strains, try these tips.  Important Note: Do not give aspirin to children or teens without first discussing it with your healthcare provider.      ? Ice    Ice reduces muscle pain and swelling. It helps most during the first 24 to 48 hours after an injury.  · Wrap an ice pack or a bag of frozen peas in a thin towel. (Never place ice directly on your skin.)  · Place the ice where your back hurts the most.  · Dont ice for more than 20 minutes at a time.  · You can use ice several times a day.  ? Medicines  Over-the-counter pain relievers can include acetaminophen and anti-inflammatory medicines, which includes aspirin or ibuprofen. They can help ease discomfort. Some also reduce swelling.  · Tell your healthcare provider about any medicines you are already taking.  · Take medicines only as directed.  ? Heat  After the first 48 hours, heat can relax sore muscles and improve blood flow.  · Try a warm bath or shower. Or use a heating pad set on low. To prevent a burn, keep a cloth between you and the heating pad.  · Dont use a heating pad for more than 15 minutes at a time. Never sleep on a heating pad.  Date Last Reviewed: 9/1/2015  © 3349-6667 [employer]. [address] [place] [address], [place] [address]. All rights reserved. This information is not intended as a substitute for professional medical care. Always follow your healthcare professional's instructions.        Unknown Causes of Abdominal Pain (Female)    The exact cause of your abdominal (stomach) pain is not clear. This does not mean that this is  something to worry about. Everyone likes to know the exact cause of the problem, but sometimes with abdominal pain, there is no clear-cut cause, and this could be a good thing. The good news is that your symptoms can be treated, and you will feel better.   Your condition does not seem serious now; however, sometimes the signs of a serious problem may take more time to appear. For this reason, it is important for you to watch for any new symptoms, problems, or worsening of your condition.  Over the next few days, the abdominal pain may come and go, or be continuous. Other common symptoms can include nausea and vomiting. Sometimes it can be difficult to tell if you feel nauseous, you may just feel bad and not associate that feeling with nausea. Constipation, diarrhea, and a fever may go along with the pain.  The pain may continue even if treated correctly over the following days. Depending on how things go, sometimes the cause can become clear and may require further or different treatment. Additional evaluations, medications, or tests may also be needed.  Home care  Your healthcare provider may prescribe medicine for pain, symptoms, or an infection.  Follow the healthcare provider's instructions for taking these medicines.  General care  · Rest as much as you can until your next exam. No strenuous activities.  · Try to find positions that ease discomfort. A small pillow placed on the abdomen may help relieve pain.  · Something warm on your abdomen (such as a heating pad) may help, but be careful not to burn yourself.  Diet  · Do not force yourself to eat, especially if having cramps, vomiting, or diarrhea.  · Water is important so you do not get dehydrated. Soup may also be good. Sports drinks may also help, especially if they are not too acidic. Make sure you don't drink sugary drinks as this can make things worse. Take liquids in small amounts. Do not guzzle them.  · Caffeine sometimes makes the pain and cramping  worse.  · Avoid dairy products if you have vomiting or diarrhea.  · Don't eat large amounts at a time. Wait a few minutes between bites.  · Eat a diet low in fiber (called a low-residue diet). Foods allowed include refined breads, white rice, fruit and vegetable juices without pulp, tender meats. These foods will pass more easily through the intestine.  · Avoid whole-grain foods, whole fruits and vegetables, meats, seeds and nuts, fried or fatty foods, dairy, alcohol and spicy foods until your symptoms go away.  Follow-up care  Follow up with your healthcare provider, or as advised, if your pain does not begin to improve in the next 24 hours.  Call 911  Call 911 if any of these occur:  · Trouble breathing  · Confusion  · Fainting or loss of consciousness  · Rapid heart rate  · Seizure  When to seek medical advice  Call your healthcare provider right away if any of these occur:  · Pain gets worse or moves to the right lower abdomen  · New or worsening vomiting or diarrhea  · Swelling of the abdomen  · Unable to pass stool for more than 3 days  · Fever of 100.4ºF (38ºC) or higher, or as directed by your healthcare provider.  · Blood in vomit or bowel movements (dark red or black color)  · Jaundice (yellow color of eyes and skin)  · Weakness, dizziness  · Chest, arm, back, neck or jaw pain  · Unexpected vaginal bleeding or missed period  · Can't keep down liquids or water and are getting dehydrated  Date Last Reviewed: 12/30/2015  © 6895-7457 [employer]. [address] [place] [address], [place] [address]. All rights reserved. This information is not intended as a substitute for professional medical care. Always follow your healthcare professional's instructions.        Understanding Lumbosacral Strain    Lumbosacral strain is a medical term for an injury that causes low back pain. The lumbosacral area (low back) is between the bottom of the ribcage and the top of the buttocks. A strain is tearing of muscles and  tendons. These tears can be very small but still cause pain.  How a lumbosacral strain happens  Muscles and tendons connected to the spine can be strained in a number of ways:  · Sitting or standing in the same position for long periods of time. This can harm the low back over time. Poor posture can make low back pain more likely.  · Moving the muscles and tendons past their usual range of motion. This can cause a sudden injury. This can happen when you twist, bend over, or lift something heavy. Not using correct technique for sports or tasks like lifting can make back injury more likely.  · Accidents or falls  Lumbosacral strain can be caused by other problems, but these are less common.  Symptoms of lumbosacral strain  Symptoms may include:  · Pain in the back, often on one side  · Pain that gets worse with movement and gets better with rest  · Inability to move as freely as usual  · Swelling, slight redness, and skin warmth in the painful area  Treatment for lumbosacral strain  Low back pain often goes away by itself within several weeks. But it often comes back. Treatment focuses on reducing pain and avoiding further injury. Bed rest is usually not recommended for low back pain. Treatments may include:  · Avoiding or changing the action that caused the problem. This helps prevent injuring the tissues again.  · Prescription or over-the-counter pain medicines. These help reduce inflammation, swelling, and pain.  · Cold or heat packs. These help reduce pain and swelling.  · Stretching and other exercises. These improve flexibility and strength.  · Physical therapy. This usually includes exercises and other treatments.  · Injections of medicine. This may relieve symptoms.  If these treatments do not relieve symptoms, your healthcare provider may order imaging tests to learn more about the problem. Sometimes you may need surgery.  Possible complications of lumbosacral strain  If the cause of the pain is not  addressed, symptoms may return or get worse. Follow your healthcare providers instructions on lifestyle changes and treating your back.     When to call your healthcare provider  Call your healthcare provider right away if you have any of these:  · Fever of 100.4°F (38°C) or higher, or as directed  · Numbness, tingling, or weakness  · Problems with bowel or bladder control, or problems having sex  · Pain that does not go away, or gets worse  · New symptoms   Date Last Reviewed: 3/10/2016  © 2795-1488 [employer]. [address] [place] [address], [place] [address]. All rights reserved. This information is not intended as a substitute for professional medical care. Always follow your healthcare professional's instructions.

## 2020-04-08 NOTE — PATIENT INSTRUCTIONS
- Rest.    - Drink plenty of fluids.      - take diclofenac as prescribed with food to help with pain and inflammation.  Do NOT take over the counter NSAIDs (aleve, advil, ibuprofen) while taking this medication.  You can take Tylenol as directed along with this medication for pain relief.  -DO NOT TAKE THE PRESCRIBED DICLOFENAC UNTIL 8 HR FROM NOW BECAUSE YOU GOT A SHOT IN CLINIC TODAY.    -YOU CAN TAKE THE PRESCRIBED POLYETHYLENE GLYCOL AS DIRECTED DAILY FOR CONSTIPATION.  DRINK PLENTY OF FLUIDS WHILE TAKING THIS MEDICATION.    -we will call you when we get results from your pelvic ultrasound and urine culture.    - Follow up with your PCP or specialty clinic as directed in the next 1-2 weeks if not improved or as needed.  You can call [phone] to schedule an appointment with the appropriate provider.    - Go to the ER or seek medical attention immediately if you develop new or worsening symptoms.    - You must understand that you have received an Urgent Care treatment only and that you may be released before all of your medical problems are known or treated.   - You, the patient, will arrange for follow up care as instructed.   - If your condition worsens or fails to improve we recommend that you receive another evaluation at the ER immediately or contact your PCP to discuss your concerns or return here.       Elevated Blood Pressure  Your blood pressure was elevated during your visit to the urgent care.  It was not so high that immediate care was needed but it is recommended that you monitor your blood pressure over the next week or two to make sure that it is not staying elevated.  Please have your blood pressure taken 2-3 times daily at different times of the day.  Write all of those blood pressures down and record the time that they were taken.  Keep all that information and take it with you to see your Primary Care Physician.  If your blood pressure is consistently above 140/90 you will need to follow  up with your PCP more quickly      Self-Care for Low Back Pain    Most people have low back pain now and then. In many cases, it isnt serious and self-care can help. Sometimes low back pain can be a sign of a bigger problem. Call your healthcare provider if your pain returns often or gets worse over time. For the long-term care of your back, get regular exercise, lose any excess weight and learn good posture.  Take a short rest  Lying down during the day may be beneficial for short periods of time if severe pain increases with sitting or standing. Long-term bed rest could be detrimental.  Reduce pain and swelling  Cold reduces swelling. Both cold and heat can reduce pain. Protect your skin by placing a towel between your body and the ice or heat source.  · For the first few days, apply an ice pack for 15 to 20 minutes .  · After the first few days, try heat for 15 minutes at a time to ease pain. Never sleep on a heating pad.  · Over-the-counter medicine can help control pain and swelling. Try aspirin or ibuprofen.  Exercise  Exercise can help your back heal. It also helps your back get stronger and more flexible, preventing any reinjury. Ask your healthcare provider about specific exercises for your back.  Use good posture to avoid reinjury  · When moving, bend at the hips and knees. Dont bend at the waist or twist around.  · When lifting, keep the object close to your body. Dont try to lift more than you can handle.  · When sitting, keep your lower back supported. Use a rolled-up towel as needed.  Seek immediate medical care if:  · Youre unable to stand or walk.  · You have a temperature over 100.4°F (38.0°C)  · You have frequent, painful, or bloody urination.  · You have severe abdominal pain.  · You have a sharp, stabbing pain.  · Your pain is constant.  · You have pain or numbness in your leg.  · You feel pain in a new area of your back.  · You notice that the pain isnt decreasing after more than a week.    Date Last Reviewed: 9/29/2015  © 8002-7366 [employer]. [address] [place] [address]. All rights reserved. This information is not intended as a substitute for professional medical care. Always follow your healthcare professional's instructions.        Relieving Back Pain  Back pain is a common problem. You can strain back muscles by lifting too much weight or just by moving the wrong way. Back strain can be uncomfortable, even painful. And it can take weeks or months to improve. To help yourself feel better and prevent future back strains, try these tips.  Important Note: Do not give aspirin to children or teens without first discussing it with your healthcare provider.      ? Ice    Ice reduces muscle pain and swelling. It helps most during the first 24 to 48 hours after an injury.  · Wrap an ice pack or a bag of frozen peas in a thin towel. (Never place ice directly on your skin.)  · Place the ice where your back hurts the most.  · Dont ice for more than 20 minutes at a time.  · You can use ice several times a day.  ? Medicines  Over-the-counter pain relievers can include acetaminophen and anti-inflammatory medicines, which includes aspirin or ibuprofen. They can help ease discomfort. Some also reduce swelling.  · Tell your healthcare provider about any medicines you are already taking.  · Take medicines only as directed.  ? Heat  After the first 48 hours, heat can relax sore muscles and improve blood flow.  · Try a warm bath or shower. Or use a heating pad set on low. To prevent a burn, keep a cloth between you and the heating pad.  · Dont use a heating pad for more than 15 minutes at a time. Never sleep on a heating pad.  Date Last Reviewed: 9/1/2015  © 9425-6419 [employer]. [address] [place] [address]. All rights reserved. This information is not intended as a substitute for professional medical care. Always follow your healthcare professional's  instructions.        Unknown Causes of Abdominal Pain (Female)    The exact cause of your abdominal (stomach) pain is not clear. This does not mean that this is something to worry about. Everyone likes to know the exact cause of the problem, but sometimes with abdominal pain, there is no clear-cut cause, and this could be a good thing. The good news is that your symptoms can be treated, and you will feel better.   Your condition does not seem serious now; however, sometimes the signs of a serious problem may take more time to appear. For this reason, it is important for you to watch for any new symptoms, problems, or worsening of your condition.  Over the next few days, the abdominal pain may come and go, or be continuous. Other common symptoms can include nausea and vomiting. Sometimes it can be difficult to tell if you feel nauseous, you may just feel bad and not associate that feeling with nausea. Constipation, diarrhea, and a fever may go along with the pain.  The pain may continue even if treated correctly over the following days. Depending on how things go, sometimes the cause can become clear and may require further or different treatment. Additional evaluations, medications, or tests may also be needed.  Home care  Your healthcare provider may prescribe medicine for pain, symptoms, or an infection.  Follow the healthcare provider's instructions for taking these medicines.  General care  · Rest as much as you can until your next exam. No strenuous activities.  · Try to find positions that ease discomfort. A small pillow placed on the abdomen may help relieve pain.  · Something warm on your abdomen (such as a heating pad) may help, but be careful not to burn yourself.  Diet  · Do not force yourself to eat, especially if having cramps, vomiting, or diarrhea.  · Water is important so you do not get dehydrated. Soup may also be good. Sports drinks may also help, especially if they are not too acidic. Make sure  you don't drink sugary drinks as this can make things worse. Take liquids in small amounts. Do not guzzle them.  · Caffeine sometimes makes the pain and cramping worse.  · Avoid dairy products if you have vomiting or diarrhea.  · Don't eat large amounts at a time. Wait a few minutes between bites.  · Eat a diet low in fiber (called a low-residue diet). Foods allowed include refined breads, white rice, fruit and vegetable juices without pulp, tender meats. These foods will pass more easily through the intestine.  · Avoid whole-grain foods, whole fruits and vegetables, meats, seeds and nuts, fried or fatty foods, dairy, alcohol and spicy foods until your symptoms go away.  Follow-up care  Follow up with your healthcare provider, or as advised, if your pain does not begin to improve in the next 24 hours.  Call 911  Call 911 if any of these occur:  · Trouble breathing  · Confusion  · Fainting or loss of consciousness  · Rapid heart rate  · Seizure  When to seek medical advice  Call your healthcare provider right away if any of these occur:  · Pain gets worse or moves to the right lower abdomen  · New or worsening vomiting or diarrhea  · Swelling of the abdomen  · Unable to pass stool for more than 3 days  · Fever of 100.4ºF (38ºC) or higher, or as directed by your healthcare provider.  · Blood in vomit or bowel movements (dark red or black color)  · Jaundice (yellow color of eyes and skin)  · Weakness, dizziness  · Chest, arm, back, neck or jaw pain  · Unexpected vaginal bleeding or missed period  · Can't keep down liquids or water and are getting dehydrated  Date Last Reviewed: 12/30/2015  © 1149-7704 [employer]. [address] [place] [address]. All rights reserved. This information is not intended as a substitute for professional medical care. Always follow your healthcare professional's instructions.        Understanding Lumbosacral Strain    Lumbosacral strain is a medical term for an injury  that causes low back pain. The lumbosacral area (low back) is between the bottom of the ribcage and the top of the buttocks. A strain is tearing of muscles and tendons. These tears can be very small but still cause pain.  How a lumbosacral strain happens  Muscles and tendons connected to the spine can be strained in a number of ways:  · Sitting or standing in the same position for long periods of time. This can harm the low back over time. Poor posture can make low back pain more likely.  · Moving the muscles and tendons past their usual range of motion. This can cause a sudden injury. This can happen when you twist, bend over, or lift something heavy. Not using correct technique for sports or tasks like lifting can make back injury more likely.  · Accidents or falls  Lumbosacral strain can be caused by other problems, but these are less common.  Symptoms of lumbosacral strain  Symptoms may include:  · Pain in the back, often on one side  · Pain that gets worse with movement and gets better with rest  · Inability to move as freely as usual  · Swelling, slight redness, and skin warmth in the painful area  Treatment for lumbosacral strain  Low back pain often goes away by itself within several weeks. But it often comes back. Treatment focuses on reducing pain and avoiding further injury. Bed rest is usually not recommended for low back pain. Treatments may include:  · Avoiding or changing the action that caused the problem. This helps prevent injuring the tissues again.  · Prescription or over-the-counter pain medicines. These help reduce inflammation, swelling, and pain.  · Cold or heat packs. These help reduce pain and swelling.  · Stretching and other exercises. These improve flexibility and strength.  · Physical therapy. This usually includes exercises and other treatments.  · Injections of medicine. This may relieve symptoms.  If these treatments do not relieve symptoms, your healthcare provider may order imaging  tests to learn more about the problem. Sometimes you may need surgery.  Possible complications of lumbosacral strain  If the cause of the pain is not addressed, symptoms may return or get worse. Follow your healthcare providers instructions on lifestyle changes and treating your back.     When to call your healthcare provider  Call your healthcare provider right away if you have any of these:  · Fever of 100.4°F (38°C) or higher, or as directed  · Numbness, tingling, or weakness  · Problems with bowel or bladder control, or problems having sex  · Pain that does not go away, or gets worse  · New symptoms   Date Last Reviewed: 3/10/2016  © 4566-6189 [employer]. [address] [place] [address], [place] [address]. All rights reserved. This information is not intended as a substitute for professional medical care. Always follow your healthcare professional's instructions.

## 2020-04-09 ENCOUNTER — TELEPHONE (OUTPATIENT)
Dept: URGENT CARE | Facility: CLINIC | Age: 55
End: 2020-04-09

## 2020-04-09 LAB — BACTERIA UR CULT: NORMAL

## 2020-04-10 ENCOUNTER — TELEPHONE (OUTPATIENT)
Dept: URGENT CARE | Facility: CLINIC | Age: 55
End: 2020-04-10

## 2020-04-10 NOTE — TELEPHONE ENCOUNTER
I called patient and informed her of negative ultrasound and urine culture.  Patient states that she is still having severe pain, more in the abdominal area on the sides of her abdomen and lower abdomen.  No other new symptoms, but the pain is severe and it is difficult for her to walk and work.  She states that she wants to be seen again because of the persistent/worsening pain.  I instructed her to go to the ER for severe abdominal pain.  Patient expresses understanding.

## 2020-09-28 ENCOUNTER — OFFICE VISIT (OUTPATIENT)
Dept: URGENT CARE | Facility: CLINIC | Age: 55
End: 2020-09-28
Payer: COMMERCIAL

## 2020-09-28 VITALS
RESPIRATION RATE: 19 BRPM | OXYGEN SATURATION: 97 % | HEART RATE: 97 BPM | TEMPERATURE: 98 F | WEIGHT: 175 LBS | SYSTOLIC BLOOD PRESSURE: 139 MMHG | DIASTOLIC BLOOD PRESSURE: 86 MMHG | BODY MASS INDEX: 32.2 KG/M2 | HEIGHT: 62 IN
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
## 2020-09-28 DIAGNOSIS — M25.511 ACUTE PAIN OF RIGHT SHOULDER: Primary | ICD-10-CM

## 2020-09-28 DIAGNOSIS — S43.401A SPRAIN OF RIGHT SHOULDER, UNSPECIFIED SHOULDER SPRAIN TYPE, INITIAL ENCOUNTER: ICD-10-CM

## 2020-09-28 PROCEDURE — 99214 OFFICE O/P EST MOD 30 MIN: CPT | Mod: 25,S$GLB,, | Performed by: PHYSICIAN ASSISTANT

## 2020-09-28 PROCEDURE — 73030 X-RAY EXAM OF SHOULDER: CPT | Mod: FY,RT,S$GLB, | Performed by: RADIOLOGY

## 2020-09-28 PROCEDURE — 73060 XR HUMERUS 2 VIEW RIGHT: ICD-10-PCS | Mod: FY,RT,S$GLB, | Performed by: RADIOLOGY

## 2020-09-28 PROCEDURE — 99214 PR OFFICE/OUTPT VISIT, EST, LEVL IV, 30-39 MIN: ICD-10-PCS | Mod: 25,S$GLB,, | Performed by: PHYSICIAN ASSISTANT

## 2020-09-28 PROCEDURE — 73030 XR SHOULDER COMPLETE 2 OR MORE VIEWS RIGHT: ICD-10-PCS | Mod: FY,RT,S$GLB, | Performed by: RADIOLOGY

## 2020-09-28 PROCEDURE — 73060 X-RAY EXAM OF HUMERUS: CPT | Mod: FY,RT,S$GLB, | Performed by: RADIOLOGY

## 2020-09-28 PROCEDURE — 96372 THER/PROPH/DIAG INJ SC/IM: CPT | Mod: S$GLB,,, | Performed by: PHYSICIAN ASSISTANT

## 2020-09-28 PROCEDURE — 96372 PR INJECTION,THERAP/PROPH/DIAG2ST, IM OR SUBCUT: ICD-10-PCS | Mod: S$GLB,,, | Performed by: PHYSICIAN ASSISTANT

## 2020-09-28 RX ORDER — KETOROLAC TROMETHAMINE 30 MG/ML
30 INJECTION, SOLUTION INTRAMUSCULAR; INTRAVENOUS
Status: COMPLETED | OUTPATIENT
Start: 2020-09-28 | End: 2020-09-28

## 2020-09-28 RX ADMIN — KETOROLAC TROMETHAMINE 30 MG: 30 INJECTION, SOLUTION INTRAMUSCULAR; INTRAVENOUS at 02:09

## 2020-09-28 NOTE — PATIENT INSTRUCTIONS
Call [phone] for ortho appointment  Follow up with them tomorrow for further evaluation  Rest  Ice  Keep sling on  Avoid NSAIDS for 24 hours after the shot you received today  You may take tylenol over the next 24 hours    Please arrange follow up with your primary medical clinic as soon as possible. You must understand that you've received an Urgent Care treatment only and that you may be released before all of your medical problems are known or treated. You, the patient, will arrange for follow up as instructed. If your symptoms worsen or fail to improve you should go to the Emergency Room.  WE CANNOT RULE OUT ALL POSSIBLE CAUSES OF YOUR SYMPTOMS IN THE URGENT CARE SETTING PLEASE GO TO THE ER IF YOU FEELS YOUR CONDITION IS WORSENING OR YOU WOULD LIKE EMERGENT EVALUATION.    Please return here or go to the Emergency Department for any concerns or worsening of condition.  If you were prescribed antibiotics, please take them to completion.  If you were prescribed a narcotic medication, do not drive or operate heavy equipment or machinery while taking these medications.  Please follow up with your primary care doctor or specialist as needed.    If you  smoke, please stop smoking.    Shoulder Sprain  A sprain is a stretching or tearing of the ligaments that hold a joint together. A sprain may take up to 8 weeks to fully heal, depending on how severe it is. Moderate to severe shoulder sprains are treated with a sling or shoulder immobilizer. Minor sprains can be treated without any special support.  Home care  The following guidelines will help you care for your injury at home:  · If a sling was given to you, leave it in place for the time advised by your healthcare provider. If you arent sure how long to wear it, ask for advice. If the sling becomes loose, adjust it so that your forearm is level with the ground. Your shoulder should feel well supported.  · Put an ice pack on the injured area for 20 minutes every  1 to 2 hours the first day. You can make your own ice pack by putting ice cubes in a plastic bag. A bag of frozen peas or something similar works well too. Wrap the bag in a thin towel. Continue with ice packs 3 to 4 times a day for the next 2 to 3 days. Then use the pack as needed to ease pain and swelling.  · You may use acetaminophen or ibuprofen to control pain, unless another pain medicine was prescribed. If you have chronic liver or kidney disease, talk with your healthcare provider before using these medicines. Also talk with your provider if youve had a stomach ulcer or gastrointestinal bleeding.  · Shoulder joints become stiff if left in a sling for too long. You should start range of motion exercises about 7 to 10 days after the injury. Talk with your provider to find out what type of exercises to do and how soon to start.  Follow-up care  Follow up with your healthcare provider, or as advised.  Any X-rays you had today dont show any broken bones, breaks, or fractures. Sometimes fractures dont show up on the first X-ray. Bruises and sprains can sometimes hurt as much as a fracture. These injuries can take time to heal completely. If your symptoms dont improve or they get worse, talk with your provider. You may need a repeat X-ray or other treatments.  When to seek medical advice  Call your healthcare provider right away if any of these occur:  · Shoulder pain or swelling in your arm that gets worse  · Fingers become cold, blue, numb, or tingly  · Large amount of bruising of the shoulder or upper arm  · Fever or chills  Date Last Reviewed: 8/1/2016  © 9129-2523 [employer]. [address] [place] [address], [place] [address]. All rights reserved. This information is not intended as a substitute for professional medical care. Always follow your healthcare professional's instructions.

## 2020-09-28 NOTE — PROGRESS NOTES
"Subjective:       Patient ID: Amber Khan is a 55 y.o. female.    Vitals:  height is 5' 2" (1.575 m) and weight is 79.4 kg (175 lb). Her temperature is 98 °F (36.7 °C). Her blood pressure is 139/86 and her pulse is 97. Her respiration is 19 and oxygen saturation is 97%.     Chief Complaint: Arm Injury    This is a 55 yr old female who presents to the clinic c/o right shoulder pain onset 4 days ago. She was walkign down the stairs, wearing socks, slipped and fell onto buttocks and slid down the stairs. She was [place] in the ED ([place] other state) and diagnosed with tailbone fracture per patient. She had xrays of her arm and shoulder at the time which she states revealed no fracture. She reports pain has worsened in right shoulder over the past few days. She denies hx of shoulder injuries in the past. She denies numbness, tingling.   Did not hit head. No LOC, N/v, HA    Arm Injury   The incident occurred 3 to 5 days ago. The incident occurred at home. The injury mechanism was a fall. The pain is present in the right shoulder. The quality of the pain is described as aching. The pain does not radiate. The pain is at a severity of 10/10. The pain is moderate. The pain has been fluctuating since the incident. Pertinent negatives include no chest pain, muscle weakness, numbness or tingling. The symptoms are aggravated by movement, lifting and palpation. She has tried acetaminophen, NSAIDs, ice and immobilization for the symptoms. The treatment provided mild relief.       Constitution: Negative for fatigue.   HENT: Negative for facial swelling and facial trauma.    Neck: Negative for neck stiffness.   Cardiovascular: Negative for chest trauma and chest pain.   Eyes: Negative for eye trauma, double vision and blurred vision.   Gastrointestinal: Negative for abdominal trauma, abdominal pain and rectal bleeding.   Genitourinary: Negative for hematuria, missed menses, genital trauma and pelvic pain.   Musculoskeletal: Positive for " pain and trauma. Negative for joint swelling and abnormal ROM of joint.   Skin: Negative for color change, wound, abrasion, laceration and bruising.   Neurological: Negative for dizziness, history of vertigo, light-headedness, coordination disturbances, altered mental status, loss of consciousness and numbness.   Hematologic/Lymphatic: Negative for history of bleeding disorder.   Psychiatric/Behavioral: Negative for altered mental status.       Objective:      Physical Exam   Constitutional: She is oriented to person, place, and time. She appears well-developed.  Non-toxic appearance. She does not appear ill. No distress.   HENT:   Head: Normocephalic and atraumatic.   Ears:   Right Ear: External ear normal.   Left Ear: External ear normal.   Eyes: Conjunctivae are normal.   Neck: Normal range of motion. Neck supple.   Cardiovascular: Normal rate and regular rhythm.   Pulmonary/Chest: Effort normal and breath sounds normal.   Abdominal: She exhibits no distension.   Musculoskeletal:      Right shoulder: She exhibits tenderness (anteriorly) and pain. She exhibits no bony tenderness, no swelling, no effusion, no crepitus, no deformity, no laceration, no spasm, normal pulse and normal strength.      Left shoulder: Normal. She exhibits normal range of motion, no tenderness, no bony tenderness and no swelling.      Right wrist: Normal. She exhibits normal range of motion, no tenderness and no bony tenderness.      Left wrist: Normal. She exhibits normal range of motion, no tenderness, no bony tenderness, no swelling and no effusion.      Comments: There is moderate TTP to anterior shoulder  No bony ttp appreciated  There is full active ROM in hands, wrists, elbows bilat  Pt refuses to assess passive or active ROm in right shoulder due to pain  2+ radial pulse bilat  Equal sensation and good cap refill bilat UE   Neurological: She is alert and oriented to person, place, and time.   Skin: Skin is warm, dry and not  diaphoretic. Capillary refill takes less than 2 seconds. Psychiatric: Her behavior is normal. Judgment and thought content normal.   X-ray Shoulder 2 Or More Views Right    Result Date: 9/28/2020  EXAMINATION: XR SHOULDER COMPLETE 2 OR MORE VIEWS RIGHT; XR HUMERUS 2 VIEW RIGHT CLINICAL HISTORY: Unspecified fall, subsequent encounter TECHNIQUE: Two views of the right shoulder; AP and lateral views right humerus. COMPARISON: Chest radiograph 03/10/2016 FINDINGS: Bones are well mineralized.  Amorphous calcific density adjacent to the superior and lateral aspect of the humeral head likely sequela of chronic rotator cuff injury/calcific tendinitis.  No displaced fracture, dislocation or destructive osseous process.  Minimal degenerative change noted about the shoulder.  No subcutaneous emphysema or radiodense retained foreign body.     No acute displaced fracture-dislocation identified. Suspected sequela of right shoulder chronic calcific tendinitis. Electronically signed by: Davy Spicer MD Date:    09/28/2020 Time:    13:55    X-ray Humerus 2 View Right    Result Date: 9/28/2020  EXAMINATION: XR SHOULDER COMPLETE 2 OR MORE VIEWS RIGHT; XR HUMERUS 2 VIEW RIGHT CLINICAL HISTORY: Unspecified fall, subsequent encounter TECHNIQUE: Two views of the right shoulder; AP and lateral views right humerus. COMPARISON: Chest radiograph 03/10/2016 FINDINGS: Bones are well mineralized.  Amorphous calcific density adjacent to the superior and lateral aspect of the humeral head likely sequela of chronic rotator cuff injury/calcific tendinitis.  No displaced fracture, dislocation or destructive osseous process.  Minimal degenerative change noted about the shoulder.  No subcutaneous emphysema or radiodense retained foreign body.     No acute displaced fracture-dislocation identified. Suspected sequela of right shoulder chronic calcific tendinitis. Electronically signed by: Davy Spicer MD Date:    09/28/2020 Time:    13:55         Assessment:       1. Acute pain of right shoulder    2. Fall, subsequent encounter    3. Sprain of right shoulder, unspecified shoulder sprain type, initial encounter        Plan:       xrays negative fracture  Recommend RICE, sling and f/u with ortho tomorrow to evaluate for ligamentous injury  Avoid NSAIDS 24 hours after toradol shot  She expressed complete understanding, all questions answered    Acute pain of right shoulder  -     Ambulatory referral/consult to Orthopedics  -     ketorolac injection 30 mg  -     SLING FOR HOME USE    Fall, subsequent encounter  -     X-ray Shoulder 2 or More Views Right; Future; Expected date: 09/28/2020  -     X-Ray Humerus 2 View Right; Future; Expected date: 09/28/2020  -     Ambulatory referral/consult to Orthopedics    Sprain of right shoulder, unspecified shoulder sprain type, initial encounter  -     SLING FOR HOME USE    Labs reviewed, pertinent imaging reviewed, previous medical records, medical history, surgical history, social history, family history reviewed.       Patient Instructions   Call [phone] for ortho appointment  Follow up with them tomorrow for further evaluation  Rest  Ice  Keep sling on  Avoid NSAIDS for 24 hours after the shot you received today  You may take tylenol over the next 24 hours    Please arrange follow up with your primary medical clinic as soon as possible. You must understand that you've received an Urgent Care treatment only and that you may be released before all of your medical problems are known or treated. You, the patient, will arrange for follow up as instructed. If your symptoms worsen or fail to improve you should go to the Emergency Room.  WE CANNOT RULE OUT ALL POSSIBLE CAUSES OF YOUR SYMPTOMS IN THE URGENT CARE SETTING PLEASE GO TO THE ER IF YOU FEELS YOUR CONDITION IS WORSENING OR YOU WOULD LIKE EMERGENT EVALUATION.    Please return here or go to the Emergency Department for any concerns or worsening of condition.  If  you were prescribed antibiotics, please take them to completion.  If you were prescribed a narcotic medication, do not drive or operate heavy equipment or machinery while taking these medications.  Please follow up with your primary care doctor or specialist as needed.    If you  smoke, please stop smoking.    Shoulder Sprain  A sprain is a stretching or tearing of the ligaments that hold a joint together. A sprain may take up to 8 weeks to fully heal, depending on how severe it is. Moderate to severe shoulder sprains are treated with a sling or shoulder immobilizer. Minor sprains can be treated without any special support.  Home care  The following guidelines will help you care for your injury at home:  · If a sling was given to you, leave it in place for the time advised by your healthcare provider. If you arent sure how long to wear it, ask for advice. If the sling becomes loose, adjust it so that your forearm is level with the ground. Your shoulder should feel well supported.  · Put an ice pack on the injured area for 20 minutes every 1 to 2 hours the first day. You can make your own ice pack by putting ice cubes in a plastic bag. A bag of frozen peas or something similar works well too. Wrap the bag in a thin towel. Continue with ice packs 3 to 4 times a day for the next 2 to 3 days. Then use the pack as needed to ease pain and swelling.  · You may use acetaminophen or ibuprofen to control pain, unless another pain medicine was prescribed. If you have chronic liver or kidney disease, talk with your healthcare provider before using these medicines. Also talk with your provider if youve had a stomach ulcer or gastrointestinal bleeding.  · Shoulder joints become stiff if left in a sling for too long. You should start range of motion exercises about 7 to 10 days after the injury. Talk with your provider to find out what type of exercises to do and how soon to start.  Follow-up care  Follow up with your healthcare  provider, or as advised.  Any X-rays you had today dont show any broken bones, breaks, or fractures. Sometimes fractures dont show up on the first X-ray. Bruises and sprains can sometimes hurt as much as a fracture. These injuries can take time to heal completely. If your symptoms dont improve or they get worse, talk with your provider. You may need a repeat X-ray or other treatments.  When to seek medical advice  Call your healthcare provider right away if any of these occur:  · Shoulder pain or swelling in your arm that gets worse  · Fingers become cold, blue, numb, or tingly  · Large amount of bruising of the shoulder or upper arm  · Fever or chills  Date Last Reviewed: 8/1/2016  © 5792-6332 [employer]. [address] [place] [address], [place] [address]. All rights reserved. This information is not intended as a substitute for professional medical care. Always follow your healthcare professional's instructions.

## 2020-09-30 ENCOUNTER — TELEPHONE (OUTPATIENT)
Dept: ORTHOPEDICS | Facility: CLINIC | Age: 55
End: 2020-09-30

## 2020-10-02 ENCOUNTER — OFFICE VISIT (OUTPATIENT)
Dept: ORTHOPEDICS | Facility: CLINIC | Age: 55
End: 2020-10-02
Payer: COMMERCIAL

## 2020-10-02 VITALS
HEART RATE: 81 BPM | DIASTOLIC BLOOD PRESSURE: 89 MMHG | SYSTOLIC BLOOD PRESSURE: 149 MMHG | HEIGHT: 62 IN | BODY MASS INDEX: 32.2 KG/M2 | WEIGHT: 175 LBS

## 2020-10-02 DIAGNOSIS — M25.511 ACUTE PAIN OF RIGHT SHOULDER: Primary | ICD-10-CM

## 2020-10-02 DIAGNOSIS — M65.20 CALCIFIC TENDINITIS: ICD-10-CM

## 2020-10-02 PROCEDURE — 3008F BODY MASS INDEX DOCD: CPT | Mod: CPTII,S$GLB,, | Performed by: PHYSICIAN ASSISTANT

## 2020-10-02 PROCEDURE — 99204 OFFICE O/P NEW MOD 45 MIN: CPT | Mod: 25,S$GLB,, | Performed by: PHYSICIAN ASSISTANT

## 2020-10-02 PROCEDURE — 20610 PR DRAIN/INJECT LARGE JOINT/BURSA: ICD-10-PCS | Mod: RT,S$GLB,, | Performed by: PHYSICIAN ASSISTANT

## 2020-10-02 PROCEDURE — 3008F PR BODY MASS INDEX (BMI) DOCUMENTED: ICD-10-PCS | Mod: CPTII,S$GLB,, | Performed by: PHYSICIAN ASSISTANT

## 2020-10-02 PROCEDURE — 20610 DRAIN/INJ JOINT/BURSA W/O US: CPT | Mod: RT,S$GLB,, | Performed by: PHYSICIAN ASSISTANT

## 2020-10-02 PROCEDURE — 99999 PR PBB SHADOW E&M-EST. PATIENT-LVL V: ICD-10-PCS | Mod: PBBFAC,,, | Performed by: PHYSICIAN ASSISTANT

## 2020-10-02 PROCEDURE — 99204 PR OFFICE/OUTPT VISIT, NEW, LEVL IV, 45-59 MIN: ICD-10-PCS | Mod: 25,S$GLB,, | Performed by: PHYSICIAN ASSISTANT

## 2020-10-02 PROCEDURE — 99999 PR PBB SHADOW E&M-EST. PATIENT-LVL V: CPT | Mod: PBBFAC,,, | Performed by: PHYSICIAN ASSISTANT

## 2020-10-02 RX ORDER — TRIAMCINOLONE ACETONIDE 40 MG/ML
80 INJECTION, SUSPENSION INTRA-ARTICULAR; INTRAMUSCULAR
Status: COMPLETED | OUTPATIENT
Start: 2020-10-02 | End: 2020-10-02

## 2020-10-02 RX ORDER — LIDOCAINE HYDROCHLORIDE 10 MG/ML
4 INJECTION, SOLUTION EPIDURAL; INFILTRATION; INTRACAUDAL; PERINEURAL ONCE
Status: COMPLETED | OUTPATIENT
Start: 2020-10-02 | End: 2020-10-02

## 2020-10-02 RX ADMIN — TRIAMCINOLONE ACETONIDE 80 MG: 40 INJECTION, SUSPENSION INTRA-ARTICULAR; INTRAMUSCULAR at 09:10

## 2020-10-02 RX ADMIN — LIDOCAINE HYDROCHLORIDE 40 MG: 10 INJECTION, SOLUTION EPIDURAL; INFILTRATION; INTRACAUDAL; PERINEURAL at 10:10

## 2020-10-02 NOTE — PROGRESS NOTES
Subjective:      Patient ID: Amber Khan is a 55 y.o. female.    Chief Complaint: Pain of the Right Shoulder      HPI  Amber Khan is a 55 y.o. female presenting today for evaluation of the right shoulder following a fall on 9/24/20. Pt fell down the stairs, did not land on the shoulder or directly impact the shoulder however noted shoulder pain. She was initially seen in an out of state ED. She later presented to urgent care 9/28, xrays negative for fracture but did reveal calcific tendinitis, she was placed into a sling. Pt then presented to the [facility] ED 9/29. She reports persistent pain in the shoulder, though has improved from initial injury. She has significantly limited motion of the shoulder. She is taking Norco as needed with some relief.     Review of patient's allergies indicates:  No Known Allergies      Current Outpatient Medications   Medication Sig Dispense Refill    albuterol (PROAIR HFA) 90 mcg/actuation inhaler ProAir HFA 90 mcg/actuation aerosol inhaler   Inhale 2 puffs every 4-6 hours by inhalation route as needed.      amitriptyline (ELAVIL) 10 MG tablet       amLODIPine (NORVASC) 10 MG tablet       amLODIPine (NORVASC) 10 MG tablet Take 1 tablet by mouth.      atorvastatin (LIPITOR) 20 MG tablet atorvastatin 20 mg tablet      azelastine (ASTELIN) 137 mcg (0.1 %) nasal spray azelastine 137 mcg (0.1 %) nasal spray aerosol   Spray 2 sprays twice a day by intranasal route.      budesonide-formoterol 160-4.5 mcg (SYMBICORT) 160-4.5 mcg/actuation HFAA Symbicort 160 mcg-4.5 mcg/actuation HFA aerosol inhaler   Inhale 2 puffs twice a day by inhalation route.      budesonide-formoterol 160-4.5 mcg (SYMBICORT) 160-4.5 mcg/actuation HFAA Symbicort 160 mcg-4.5 mcg/actuation HFA aerosol inhaler      dicyclomine (BENTYL) 20 mg tablet dicyclomine 20 mg tablet   Take 1 tablet 4 times a day by oral route for 30 days.      fluticasone propionate (FLONASE) 50 mcg/actuation nasal spray fluticasone  propionate 50 mcg/actuation nasal spray,suspension      hyoscyamine (ANASPAZ,LEVSIN) 0.125 mg Tab Take 0.125 mg by mouth.      ibuprofen (ADVIL,MOTRIN) 800 MG tablet       ibuprofen (ADVIL,MOTRIN) 800 MG tablet ibuprofen 800 mg tablet      lansoprazole (PREVACID) 30 MG capsule lansoprazole 30 mg capsule,delayed release   Please specify directions, refills and quantity      levoFLOXacin (LEVAQUIN) 500 MG tablet levofloxacin 500 mg tablet      levothyroxine (SYNTHROID) 25 MCG tablet Take 25 mcg by mouth once daily.      levothyroxine (SYNTHROID) 25 MCG tablet Take 1 tablet by mouth.      lisinopril (PRINIVIL,ZESTRIL) 20 MG tablet       lisinopriL (PRINIVIL,ZESTRIL) 20 MG tablet Take 2 tablets by mouth.      lisinopril-hydrochlorothiazide (PRINZIDE,ZESTORETIC) 20-12.5 mg per tablet lisinopril 20 mg-hydrochlorothiazide 12.5 mg tablet   Take 1 tablet(s) every day by oral route.      LYRICA 75 mg capsule       meclizine (ANTIVERT) 25 mg tablet Take 1 tablet (25 mg total) by mouth 3 (three) times daily as needed for Dizziness. 30 tablet 0    meclizine (ANTIVERT) 25 mg tablet Take 1 tablet (25 mg total) by mouth 3 (three) times daily as needed for Dizziness. 30 tablet 0    methylPREDNISolone (MEDROL DOSEPACK) 4 mg tablet use as directed 1 Package 0    montelukast (SINGULAIR) 10 mg tablet       mupirocin (BACTROBAN) 2 % ointment Apply to affected area 3 times daily 22 g 1    pantoprazole (PROTONIX) 40 MG tablet Take 40 mg by mouth once daily.      pantoprazole (PROTONIX) 40 MG tablet Take 40 mg by mouth.      rosuvastatin (CRESTOR) 20 MG tablet Take 20 mg by mouth once daily.      VITAMIN D2 50,000 unit capsule       DULoxetine (CYMBALTA) 30 MG capsule Take 1 capsule (30 mg total) by mouth 2 (two) times daily. 180 capsule 0     Current Facility-Administered Medications   Medication Dose Route Frequency Provider Last Rate Last Dose    [COMPLETED] lidocaine (PF) 10 mg/ml (1%) injection 40 mg  4 mL Other  "Once Marce Voss PA-C   40 mg at 10/02/20 1000       Past Medical History:   Diagnosis Date    Asthma     Fibromyalgia 2009    Hyperlipidemia     Hypertension     Hypothyroidism        Past Surgical History:   Procedure Laterality Date    HYSTERECTOMY      TUBAL LIGATION         Review of Systems:  Constitutional: Negative for chills and fever.   Respiratory: Negative for cough and shortness of breath.    Gastrointestinal: Negative for nausea and vomiting.   Skin: Negative for rash.   Neurological: Negative for dizziness and headaches.   Psychiatric/Behavioral: Negative for depression.   MSK as in HPI       OBJECTIVE:     PHYSICAL EXAM:  BP (!) 149/89   Pulse 81   Ht 5' 2" (1.575 m)   Wt 79.4 kg (175 lb)   BMI 32.01 kg/m²     GEN:  NAD, well-developed, well-groomed.  NEURO: Awake, alert, and oriented. Normal attention and concentration.    PSYCH: Normal mood and affect. Behavior is normal.  HEENT: No cervical lymphadenopathy noted.  CARDIOVASCULAR: Radial pulses 2+ bilaterally. No LE edema noted.  PULMONARY: Breath sounds normal. No respiratory distress.  SKIN: Intact, no rashes.      MSK:   RUE:  Good active ROM of the wrist and fingers. Significantly limited shoulder motion secondary to pain. She is able to touch her chin but unable to touch her shoulder. She only has about 30 degrees of active FF and abduction. Motion is increased some passively. She has notable tenderness over the anterior shoulder. AIN/PIN/Radial/Median/Ulnar Nerves assessed in isolation without deficit. Radial & Ulnar arteries palpated 2+. Capillary Refill <3s.      RADIOGRAPHS:  Xray right shoulder 9/28/20  FINDINGS:  Bones are well mineralized.  Amorphous calcific density adjacent to the superior and lateral aspect of the humeral head likely sequela of chronic rotator cuff injury/calcific tendinitis.  No displaced fracture, dislocation or destructive osseous process.  Minimal degenerative change noted about the shoulder.  No " subcutaneous emphysema or radiodense retained foreign body.    Xray right humerus 9/28/20  FINDINGS:  Bones are well mineralized.  Amorphous calcific density adjacent to the superior and lateral aspect of the humeral head likely sequela of chronic rotator cuff injury/calcific tendinitis.  No displaced fracture, dislocation or destructive osseous process.  Minimal degenerative change noted about the shoulder.  No subcutaneous emphysema or radiodense retained foreign body.    Comments: I have personally reviewed the imaging and I agree with the above radiologist's report.    ASSESSMENT/PLAN:       ICD-10-CM ICD-9-CM   1. Acute pain of right shoulder  M25.511 719.41   2. Calcific tendinitis  M65.20 727.82       Orders Placed This Encounter    Ambulatory referral/consult to Physical/Occupational Therapy    lidocaine (PF) 10 mg/ml (1%) injection 40 mg    triamcinolone acetonide injection 80 mg     Plan:   -Treatment options reviewed including conservative treatment vs MRI. She would like to proceed with a right shoulder injection and PT. D/c sling.   -RTC 6 wks       I have explained the risks, benefits, and alternatives of the procedure in detail.  The patient voices understanding and all questions have been answered.  The patient agrees to proceed as planned. After a sterile prep of the skin in the normal the right shoulder is injected from the posterior approach using a 22 gauge needle with a combination of 4cc 1% lidocaine and 80 mg of kenalog. The patient is cautioned and immediate relief of pain is secondary to the local anesthetic and will be temporary.  After the anesthetic wears off there may be a increase in pain that may last for a few hours or a few days and they should use ice to help alleviate this flair up of pain.       The patient indicates understanding of these issues and agrees to the plan.    Marce Voss PA-C  [facility] [place], [place]

## 2020-10-02 NOTE — LETTER
October 2, 2020      Sonia Mohr PA-C  [address] [facility] [address]           [place] [address]  [address], [address]  [facility] [address]  Phone: [phone]          Patient: Amber Khan   MR Number: [id number]   YOB: 1965   Date of Visit: 10/2/2020       Dear Sonia Mohr:    Thank you for referring Amber Khan to me for evaluation. Attached you will find relevant portions of my assessment and plan of care.    If you have questions, please do not hesitate to call me. I look forward to following Amber Khan along with you.    Sincerely,    Marce Voss PA-C    Enclosure  CC:  No Recipients    If you would like to receive this communication electronically, please contact [email or web address] or [phone] to request more information on [email or web address] Link access.    For providers and/or their staff who would like to refer a patient to [facility], please contact us through our one-stop-shop provider referral line, [facility], at [phone].    If you feel you have received this communication in error or would no longer like to receive these types of communications, please e-mail [email or web address]

## 2020-11-12 ENCOUNTER — OFFICE VISIT (OUTPATIENT)
Dept: ORTHOPEDICS | Facility: CLINIC | Age: 55
End: 2020-11-12
Payer: COMMERCIAL

## 2020-11-12 VITALS
HEART RATE: 76 BPM | DIASTOLIC BLOOD PRESSURE: 85 MMHG | HEIGHT: 62 IN | SYSTOLIC BLOOD PRESSURE: 130 MMHG | WEIGHT: 175 LBS | BODY MASS INDEX: 32.2 KG/M2

## 2020-11-12 DIAGNOSIS — M65.20 CALCIFIC TENDINITIS: Primary | ICD-10-CM

## 2020-11-12 PROCEDURE — 99214 OFFICE O/P EST MOD 30 MIN: CPT | Mod: S$GLB,,, | Performed by: PHYSICIAN ASSISTANT

## 2020-11-12 PROCEDURE — 1125F AMNT PAIN NOTED PAIN PRSNT: CPT | Mod: S$GLB,,, | Performed by: PHYSICIAN ASSISTANT

## 2020-11-12 PROCEDURE — 3008F BODY MASS INDEX DOCD: CPT | Mod: CPTII,S$GLB,, | Performed by: PHYSICIAN ASSISTANT

## 2020-11-12 PROCEDURE — 99214 PR OFFICE/OUTPT VISIT, EST, LEVL IV, 30-39 MIN: ICD-10-PCS | Mod: S$GLB,,, | Performed by: PHYSICIAN ASSISTANT

## 2020-11-12 PROCEDURE — 99999 PR PBB SHADOW E&M-EST. PATIENT-LVL V: CPT | Mod: PBBFAC,,, | Performed by: PHYSICIAN ASSISTANT

## 2020-11-12 PROCEDURE — 3008F PR BODY MASS INDEX (BMI) DOCUMENTED: ICD-10-PCS | Mod: CPTII,S$GLB,, | Performed by: PHYSICIAN ASSISTANT

## 2020-11-12 PROCEDURE — 99999 PR PBB SHADOW E&M-EST. PATIENT-LVL V: ICD-10-PCS | Mod: PBBFAC,,, | Performed by: PHYSICIAN ASSISTANT

## 2020-11-12 PROCEDURE — 1125F PR PAIN SEVERITY QUANTIFIED, PAIN PRESENT: ICD-10-PCS | Mod: S$GLB,,, | Performed by: PHYSICIAN ASSISTANT

## 2020-11-12 NOTE — LETTER
November 12, 2020      Sonia Mohr PA-C  [address] [facility] [address]           [place] [address]  [address], [address]  [facility] [address]  Phone: [phone]          Patient: Amber Khan   MR Number: [id number]   YOB: 1965   Date of Visit: 11/12/2020       Dear Sonia Mohr:    Thank you for referring Amber Khan to me for evaluation. Attached you will find relevant portions of my assessment and plan of care.    If you have questions, please do not hesitate to call me. I look forward to following Amber Khan along with you.    Sincerely,    Marce Voss PA-C    Enclosure  CC:  No Recipients    If you would like to receive this communication electronically, please contact [email or web address] or [phone] to request more information on [email or web address] Link access.    For providers and/or their staff who would like to refer a patient to [facility], please contact us through our one-stop-shop provider referral line, [facility], at [phone].    If you feel you have received this communication in error or would no longer like to receive these types of communications, please e-mail [email or web address]

## 2020-11-12 NOTE — PROGRESS NOTES
Subjective:      Patient ID: Amber Khan is a 55 y.o. female.    Chief Complaint: Follow-up of the Right Shoulder      11/12/20  Pt presents for follow up right shoulder calcific tendinitis. At last visit an injection was performed and PT ordered. She reports she has been attending PT. She reports notable improvement in pain and motion but does still have some pain. She reports at last visit her pain was 9-10/10 and today is about 5/10.    Review of patient's allergies indicates:  No Known Allergies      Current Outpatient Medications   Medication Sig Dispense Refill    albuterol (PROAIR HFA) 90 mcg/actuation inhaler ProAir HFA 90 mcg/actuation aerosol inhaler   Inhale 2 puffs every 4-6 hours by inhalation route as needed.      amitriptyline (ELAVIL) 10 MG tablet       amLODIPine (NORVASC) 10 MG tablet       amLODIPine (NORVASC) 10 MG tablet Take 1 tablet by mouth.      atorvastatin (LIPITOR) 20 MG tablet atorvastatin 20 mg tablet      azelastine (ASTELIN) 137 mcg (0.1 %) nasal spray azelastine 137 mcg (0.1 %) nasal spray aerosol   Spray 2 sprays twice a day by intranasal route.      budesonide-formoterol 160-4.5 mcg (SYMBICORT) 160-4.5 mcg/actuation HFAA Symbicort 160 mcg-4.5 mcg/actuation HFA aerosol inhaler   Inhale 2 puffs twice a day by inhalation route.      budesonide-formoterol 160-4.5 mcg (SYMBICORT) 160-4.5 mcg/actuation HFAA Symbicort 160 mcg-4.5 mcg/actuation HFA aerosol inhaler      dicyclomine (BENTYL) 20 mg tablet dicyclomine 20 mg tablet   Take 1 tablet 4 times a day by oral route for 30 days.      DULoxetine (CYMBALTA) 30 MG capsule Take 1 capsule (30 mg total) by mouth 2 (two) times daily. 180 capsule 0    fluticasone propionate (FLONASE) 50 mcg/actuation nasal spray fluticasone propionate 50 mcg/actuation nasal spray,suspension      hyoscyamine (ANASPAZ,LEVSIN) 0.125 mg Tab Take 0.125 mg by mouth.      ibuprofen (ADVIL,MOTRIN) 800 MG tablet       ibuprofen (ADVIL,MOTRIN) 800 MG  tablet ibuprofen 800 mg tablet      lansoprazole (PREVACID) 30 MG capsule lansoprazole 30 mg capsule,delayed release   Please specify directions, refills and quantity      levoFLOXacin (LEVAQUIN) 500 MG tablet levofloxacin 500 mg tablet      levothyroxine (SYNTHROID) 25 MCG tablet Take 25 mcg by mouth once daily.      levothyroxine (SYNTHROID) 25 MCG tablet Take 1 tablet by mouth.      lisinopril (PRINIVIL,ZESTRIL) 20 MG tablet       lisinopriL (PRINIVIL,ZESTRIL) 20 MG tablet Take 2 tablets by mouth.      lisinopril-hydrochlorothiazide (PRINZIDE,ZESTORETIC) 20-12.5 mg per tablet lisinopril 20 mg-hydrochlorothiazide 12.5 mg tablet   Take 1 tablet(s) every day by oral route.      LYRICA 75 mg capsule       meclizine (ANTIVERT) 25 mg tablet Take 1 tablet (25 mg total) by mouth 3 (three) times daily as needed for Dizziness. 30 tablet 0    meclizine (ANTIVERT) 25 mg tablet Take 1 tablet (25 mg total) by mouth 3 (three) times daily as needed for Dizziness. 30 tablet 0    methylPREDNISolone (MEDROL DOSEPACK) 4 mg tablet use as directed 1 Package 0    montelukast (SINGULAIR) 10 mg tablet       mupirocin (BACTROBAN) 2 % ointment Apply to affected area 3 times daily 22 g 1    pantoprazole (PROTONIX) 40 MG tablet Take 40 mg by mouth once daily.      pantoprazole (PROTONIX) 40 MG tablet Take 40 mg by mouth.      rosuvastatin (CRESTOR) 20 MG tablet Take 20 mg by mouth once daily.      VITAMIN D2 50,000 unit capsule        No current facility-administered medications for this visit.        Past Medical History:   Diagnosis Date    Asthma     Fibromyalgia 2009    Hyperlipidemia     Hypertension     Hypothyroidism        Past Surgical History:   Procedure Laterality Date    HYSTERECTOMY      TUBAL LIGATION         Review of Systems:  Constitutional: Negative for chills and fever.   Respiratory: Negative for cough and shortness of breath.    Gastrointestinal: Negative for nausea and vomiting.   Skin: Negative  "for rash.   Neurological: Negative for dizziness and headaches.   Psychiatric/Behavioral: Negative for depression.   MSK as in HPI       OBJECTIVE:     PHYSICAL EXAM:  /85   Pulse 76   Ht 5' 2" (1.575 m)   Wt 79.4 kg (175 lb)   BMI 32.01 kg/m²     GEN:  NAD, well-developed, well-groomed.  NEURO: Awake, alert, and oriented. Normal attention and concentration.    PSYCH: Normal mood and affect. Behavior is normal.  HEENT: No cervical lymphadenopathy noted.  CARDIOVASCULAR: Radial pulses 2+ bilaterally. No LE edema noted.  PULMONARY: Breath sounds normal. No respiratory distress.  SKIN: Intact, no rashes.      MSK:   RUE:  Good active ROM of the wrist and fingers. Good shoulder motion. AIN/PIN/Radial/Median/Ulnar Nerves assessed in isolation without deficit. Radial & Ulnar arteries palpated 2+. Capillary Refill <3s.      RADIOGRAPHS:  Xray right shoulder 9/28/20  FINDINGS:  Bones are well mineralized.  Amorphous calcific density adjacent to the superior and lateral aspect of the humeral head likely sequela of chronic rotator cuff injury/calcific tendinitis.  No displaced fracture, dislocation or destructive osseous process.  Minimal degenerative change noted about the shoulder.  No subcutaneous emphysema or radiodense retained foreign body.    Comments: I have personally reviewed the imaging and I agree with the above radiologist's report.    ASSESSMENT/PLAN:       ICD-10-CM ICD-9-CM   1. Calcific tendinitis  M65.20 727.82          Plan:   -continue PT   -RTC 6 wks if needed. If symptoms persist will consider repeat injection vs MRI       The patient indicates understanding of these issues and agrees to the plan.    Marce Voss PA-C  [facility]   [facility] [place], [place]        "

## 2020-11-15 ENCOUNTER — OFFICE VISIT (OUTPATIENT)
Dept: URGENT CARE | Facility: CLINIC | Age: 55
End: 2020-11-15
Payer: COMMERCIAL

## 2020-11-15 VITALS
TEMPERATURE: 100 F | OXYGEN SATURATION: 95 % | HEART RATE: 97 BPM | BODY MASS INDEX: 32.2 KG/M2 | SYSTOLIC BLOOD PRESSURE: 148 MMHG | WEIGHT: 175 LBS | HEIGHT: 62 IN | DIASTOLIC BLOOD PRESSURE: 95 MMHG

## 2020-11-15 DIAGNOSIS — U07.1 COVID-19: Primary | ICD-10-CM

## 2020-11-15 DIAGNOSIS — Z03.818 ENCOUNTER FOR OBSERVATION FOR SUSPECTED EXPOSURE TO OTHER BIOLOGICAL AGENTS RULED OUT: ICD-10-CM

## 2020-11-15 LAB
CTP QC/QA: YES
SARS-COV-2 RDRP RESP QL NAA+PROBE: POSITIVE

## 2020-11-15 PROCEDURE — U0002: ICD-10-PCS | Mod: QW,S$GLB,, | Performed by: NURSE PRACTITIONER

## 2020-11-15 PROCEDURE — 99213 OFFICE O/P EST LOW 20 MIN: CPT | Mod: S$GLB,,, | Performed by: NURSE PRACTITIONER

## 2020-11-15 PROCEDURE — U0002 COVID-19 LAB TEST NON-CDC: HCPCS | Mod: QW,S$GLB,, | Performed by: NURSE PRACTITIONER

## 2020-11-15 PROCEDURE — 99213 PR OFFICE/OUTPT VISIT, EST, LEVL III, 20-29 MIN: ICD-10-PCS | Mod: S$GLB,,, | Performed by: NURSE PRACTITIONER

## 2020-11-15 PROCEDURE — 3008F PR BODY MASS INDEX (BMI) DOCUMENTED: ICD-10-PCS | Mod: CPTII,S$GLB,, | Performed by: NURSE PRACTITIONER

## 2020-11-15 PROCEDURE — 3008F BODY MASS INDEX DOCD: CPT | Mod: CPTII,S$GLB,, | Performed by: NURSE PRACTITIONER

## 2020-11-15 RX ORDER — AZITHROMYCIN 250 MG/1
TABLET, FILM COATED ORAL
Qty: 6 TABLET | Refills: 0 | Status: SHIPPED | OUTPATIENT
Start: 2020-11-15 | End: 2022-11-22

## 2020-11-15 RX ORDER — BENZONATATE 200 MG/1
200 CAPSULE ORAL 3 TIMES DAILY PRN
Qty: 20 CAPSULE | Refills: 0 | Status: SHIPPED | OUTPATIENT
Start: 2020-11-15 | End: 2020-11-25

## 2020-11-15 RX ORDER — ALBUTEROL SULFATE 90 UG/1
2 AEROSOL, METERED RESPIRATORY (INHALATION) EVERY 6 HOURS PRN
Qty: 6.7 G | Refills: 0 | Status: SHIPPED | OUTPATIENT
Start: 2020-11-15 | End: 2020-12-19

## 2020-11-15 RX ORDER — PROMETHAZINE HYDROCHLORIDE AND DEXTROMETHORPHAN HYDROBROMIDE 6.25; 15 MG/5ML; MG/5ML
5 SYRUP ORAL
Qty: 118 ML | Refills: 0 | Status: SHIPPED | OUTPATIENT
Start: 2020-11-15 | End: 2020-11-25

## 2020-11-15 NOTE — PROGRESS NOTES
"Subjective:       Patient ID: Amber Khan is a 55 y.o. female.    Vitals:  height is 5' 2" (1.575 m) and weight is 79.4 kg (175 lb). Her temperature is 99.9 °F (37.7 °C). Her blood pressure is 148/95 (abnormal) and her pulse is 97. Her oxygen saturation is 95%.     Chief Complaint: COVID-19 Concerns     Pt denies SOB, chest pain, dizziness, heart palpitations, nausea and vomiting. Limited contact with possible covid-19 per [facility] emergency protocol. Pt verbalized agreement to limit contact to reduce exposure of Covid-19 and URIs. Covid-19 teaching complete.       URI   This is a new problem. The current episode started in the past 7 days. The problem has been unchanged. There has been no fever. Associated symptoms include congestion and coughing. Pertinent negatives include no ear pain, nausea, rash, sinus pain, sore throat, vomiting or wheezing. She has tried nothing for the symptoms.       Constitution: Negative for chills, sweating, fatigue and fever.   HENT: Positive for congestion and postnasal drip. Negative for ear pain, sinus pain, sinus pressure, sore throat and voice change.    Neck: Negative for painful lymph nodes.   Eyes: Negative for eye redness.   Respiratory: Positive for cough and asthma. Negative for chest tightness, sputum production, bloody sputum, COPD, shortness of breath, stridor and wheezing.    Gastrointestinal: Negative for nausea and vomiting.   Musculoskeletal: Negative for muscle ache.   Skin: Negative for rash.   Allergic/Immunologic: Positive for seasonal allergies and asthma.   Hematologic/Lymphatic: Negative for swollen lymph nodes.       Objective:      Physical Exam   Constitutional: She is oriented to person, place, and time. She appears well-developed. She is cooperative.  Non-toxic appearance. She does not appear ill. No distress.   HENT:   Head: Normocephalic and atraumatic.   Ears:   Right Ear: Hearing, tympanic membrane, external ear and ear canal normal.   Left Ear: " Hearing, tympanic membrane, external ear and ear canal normal.   Nose: Nose normal. No mucosal edema, rhinorrhea or nasal deformity. No epistaxis. Right sinus exhibits no maxillary sinus tenderness and no frontal sinus tenderness. Left sinus exhibits no maxillary sinus tenderness and no frontal sinus tenderness.   Mouth/Throat: Uvula is midline, oropharynx is clear and moist and mucous membranes are normal. No trismus in the jaw. Normal dentition. No uvula swelling. No posterior oropharyngeal erythema.   Eyes: Conjunctivae and lids are normal. Right eye exhibits no discharge. Left eye exhibits no discharge. No scleral icterus.   Neck: Trachea normal, normal range of motion, full passive range of motion without pain and phonation normal. Neck supple.   Cardiovascular: Normal rate, normal heart sounds and normal pulses.   Pulmonary/Chest: Effort normal. No respiratory distress.   Abdominal: Soft. Normal appearance. She exhibits no distension, no pulsatile midline mass and no mass. There is no abdominal tenderness.   Musculoskeletal: Normal range of motion.         General: No deformity.   Neurological: She is alert and oriented to person, place, and time. She exhibits normal muscle tone. Coordination normal.   Skin: Skin is warm, dry, intact, not diaphoretic and not pale. Psychiatric: Her speech is normal and behavior is normal. Judgment and thought content normal.   Nursing note and vitals reviewed.        Assessment:       1. COVID-19    2. Encounter for observation for suspected exposure to other biological agents ruled out        Plan:         COVID-19    Encounter for observation for suspected exposure to other biological agents ruled out  -     POCT COVID-19 Rapid Screening    Other orders  -     azithromycin (ZITHROMAX Z-DICK) 250 MG tablet; Take 2 tablets (500 mg) on  Day 1,  followed by 1 tablet (250 mg) once daily on Days 2 through 5.  Dispense: 6 tablet; Refill: 0  -     promethazine-dextromethorphan  (PROMETHAZINE-DM) 6.25-15 mg/5 mL Syrp; Take 5 mLs by mouth every 4 to 6 hours as needed.  Dispense: 118 mL; Refill: 0  -     benzonatate (TESSALON) 200 MG capsule; Take 1 capsule (200 mg total) by mouth 3 (three) times daily as needed for Cough.  Dispense: 20 capsule; Refill: 0  -     albuterol (PROVENTIL/VENTOLIN HFA) 90 mcg/actuation inhaler; Inhale 2 puffs into the lungs every 6 (six) hours as needed for Wheezing. Cough and Shortness of Breath. Rescue  Dispense: 6.7 g; Refill: 0  -     COVID-19 Home Symptom Monitoring  - Duration (days): 14

## 2020-11-16 NOTE — PATIENT INSTRUCTIONS
Urgent Care Management:  - Treatment plan discussed.  - PCP recommendations given.  - Return precautions advised.  - Patient agrees with and understands plan of care.    Patient Instructions, Education, Teaching and Summary of Visit:      RETURN TO CLINIC IF SYMPTOMS WORSEN OR CALL 911 IMMEDIATELY FOR SHORTNESS OF BREATH, CHEST PAIN, DIZZINESS, WORSENING PAIN, NAUSEA AND VOMITING, HEART PALPITATIONS, FEVER AND/OR NECK STIFFNESS. FOLLOW UP WITH PRIMARY CARE PROVIDER IN THE AM.    -Diagnosis and treatment plan discussed with patient.  -Patient agreed with my treatment plan.  -Patient will follow up with primary care provider or Specialty Provider, as discussed.     -If you were prescribed a narcotic or controlled medication, do not drive or operate heavy equipment or machinery while taking these medications.  -You must understand that you've received an Urgent Care treatment only and that you may be released before all your medical problems are known or treated.   -You, the patient, will arrange for follow up care as instructed.  -Follow up with your PCP or specialty clinic as directed in the next 1-2 weeks if not improved or as needed.    -You can call [phone] to schedule an appointment with the appropriate provider.  -If your condition worsens we recommend that you receive another evaluation at the emergency room immediately or contact your primary medical clinics after hours call service to discuss your concerns.  -Please return here or go to the Emergency Department for any concerns or worsening of condition.    Please arrange follow up with your primary medical clinic as soon as possible. You must understand that you've received an Urgent Care treatment only and that you may be released before all of your medical problems are known or treated. You, the patient, will arrange for follow up as instructed. If your symptoms worsen or fail to improve you should go to the Emergency Room.    WE CANNOT RULE OUT  ALL POSSIBLE CAUSES OF YOUR SYMPTOMS IN THE URGENT CARE SETTING PLEASE GO TO THE ER IF YOU FEELS YOUR CONDITION IS WORSENING OR YOU WOULD LIKE EMERGENT EVALUATION.     Please return here or go to the Emergency Department for any concerns or worsening of condition.  If you were prescribed antibiotics, please take them to completion.  If you were prescribed a narcotic medication, do not drive or operate heavy equipment or machinery while taking these medications.  Please follow up with your primary care doctor or specialist as needed.     If you  smoke, please stop smoking.Guidelines for General Prevention of COVID-19    o Take steps to protect yourself from COVID-19. Perform hand hygiene frequently. Wash your hands often with soap and water for at least 20 seconds of use and alcohol-based hand , covering all surfaces of your hands and rubbing them together until they feel dry.  o Avoid touching your eyes, nose, and mouth with unwashed hands.  o Avoid close contact with people and stay home if youre sick, except to get medical care.   o Cover coughs and sneezes with a tissue, or use the inside of your elbow. Immediately wash your hands or use hand .     For more information, see CDC link below:    https://www.cdc.gov/coronavirus/2019-ncov/hcp/guidance-prevent-spread.html#precautions

## 2020-12-04 ENCOUNTER — CLINICAL SUPPORT (OUTPATIENT)
Dept: URGENT CARE | Facility: CLINIC | Age: 55
End: 2020-12-04
Payer: COMMERCIAL

## 2020-12-04 DIAGNOSIS — Z20.822 EXPOSURE TO COVID-19 VIRUS: Primary | ICD-10-CM

## 2020-12-04 LAB
CTP QC/QA: YES
SARS-COV-2 RDRP RESP QL NAA+PROBE: NEGATIVE

## 2020-12-04 PROCEDURE — U0002: ICD-10-PCS | Mod: QW,S$GLB,, | Performed by: NURSE PRACTITIONER

## 2020-12-04 PROCEDURE — U0002 COVID-19 LAB TEST NON-CDC: HCPCS | Mod: QW,S$GLB,, | Performed by: NURSE PRACTITIONER

## 2020-12-04 NOTE — PATIENT INSTRUCTIONS
"Your test was NEGATIVE for COVID-19 (coronavirus).      You may leave home and/or return to work when the following conditions are met:   24 hours fever free without fever-reducing medications AND   Improved symptoms   You have not met the conditions of a closed exposure       A "close exposure" is defined as anyone who has had an exposure (masked or unmasked) to a known COVID -19 positive person within 6 ft for longer than 15 minutes. If your exposure meets this definition you are required by CDC guidelines to quarantine for 14 days from time of exposure regardless of test status.      Additional instructions:  · Social distance per your local guidelines  · Call ahead before visiting your doctor.  · Wear a facemask when around others who do not live in your household.  · Cover your coughs and sneezes.  · Wash your hands often with soap and water; hand  can be used, too.      If your symptoms worsen or if you have any other concerns, please contact [facility] at [phone].     Sincerely,    Jelani Sanabria MA  "

## 2020-12-31 ENCOUNTER — OFFICE VISIT (OUTPATIENT)
Dept: URGENT CARE | Facility: CLINIC | Age: 55
End: 2020-12-31
Payer: COMMERCIAL

## 2020-12-31 VITALS
TEMPERATURE: 99 F | WEIGHT: 175 LBS | HEIGHT: 62 IN | SYSTOLIC BLOOD PRESSURE: 146 MMHG | OXYGEN SATURATION: 98 % | DIASTOLIC BLOOD PRESSURE: 87 MMHG | HEART RATE: 78 BPM | BODY MASS INDEX: 32.2 KG/M2 | RESPIRATION RATE: 16 BRPM

## 2020-12-31 DIAGNOSIS — J01.90 ACUTE BACTERIAL SINUSITIS: Primary | ICD-10-CM

## 2020-12-31 DIAGNOSIS — B96.89 ACUTE BACTERIAL SINUSITIS: Primary | ICD-10-CM

## 2020-12-31 PROCEDURE — 99214 OFFICE O/P EST MOD 30 MIN: CPT | Mod: S$GLB,,, | Performed by: NURSE PRACTITIONER

## 2020-12-31 PROCEDURE — 3008F PR BODY MASS INDEX (BMI) DOCUMENTED: ICD-10-PCS | Mod: CPTII,S$GLB,, | Performed by: NURSE PRACTITIONER

## 2020-12-31 PROCEDURE — 99214 PR OFFICE/OUTPT VISIT, EST, LEVL IV, 30-39 MIN: ICD-10-PCS | Mod: S$GLB,,, | Performed by: NURSE PRACTITIONER

## 2020-12-31 PROCEDURE — 3008F BODY MASS INDEX DOCD: CPT | Mod: CPTII,S$GLB,, | Performed by: NURSE PRACTITIONER

## 2020-12-31 RX ORDER — AMOXICILLIN AND CLAVULANATE POTASSIUM 875; 125 MG/1; MG/1
1 TABLET, FILM COATED ORAL EVERY 12 HOURS
Qty: 14 TABLET | Refills: 0 | Status: SHIPPED | OUTPATIENT
Start: 2020-12-31 | End: 2021-01-07

## 2021-03-01 NOTE — PROGRESS NOTES
"Subjective:       Patient ID: Amber Khan is a 55 y.o. female.    Vitals:  height is 5' 2" (1.575 m) and weight is 79.4 kg (175 lb). Her temperature is 98.7 °F (37.1 °C). Her blood pressure is 146/87 (abnormal) and her pulse is 78. Her respiration is 16 and oxygen saturation is 98%.     Chief Complaint: URI    URI   This is a new problem. The current episode started 1 to 4 weeks ago. The problem has been unchanged. There has been no fever. Associated symptoms include coughing. Pertinent negatives include no congestion, ear pain, nausea, rash, sinus pain, sore throat, vomiting or wheezing. She has tried decongestant and antihistamine for the symptoms. The treatment provided no relief.       Constitution: Negative for chills, sweating, fatigue and fever.   HENT: Positive for sinus pressure. Negative for ear pain, congestion, sinus pain, sore throat and voice change.    Neck: Negative for painful lymph nodes.   Eyes: Negative for eye redness.   Respiratory: Positive for cough. Negative for chest tightness, sputum production, bloody sputum, COPD, shortness of breath, stridor, wheezing and asthma.    Gastrointestinal: Negative for nausea and vomiting.   Musculoskeletal: Negative for muscle ache.   Skin: Negative for rash.   Allergic/Immunologic: Negative for seasonal allergies and asthma.   Hematologic/Lymphatic: Negative for swollen lymph nodes.       Objective:      Physical Exam   Constitutional: She is oriented to person, place, and time. She appears well-developed. She is cooperative.  Non-toxic appearance. She does not appear ill. No distress.   HENT:   Head: Normocephalic and atraumatic.   Ears:   Right Ear: Hearing, tympanic membrane, external ear and ear canal normal.   Left Ear: Hearing, tympanic membrane, external ear and ear canal normal.   Nose: Congestion present. No mucosal edema, rhinorrhea or nasal deformity. No epistaxis. Right sinus exhibits frontal sinus tenderness. Right sinus exhibits no maxillary " Post-Op Assessment Note    CV Status:  Stable  Pain Score: 0    Pain management: adequate     Mental Status:  Alert and awake   Hydration Status:  Euvolemic   PONV Controlled:  Controlled   Airway Patency:  Patent      Post Op Vitals Reviewed: Yes      Staff: Anesthesiologist, CRNA         No complications documented      BP      Temp     Pulse     Resp      SpO2 "sinus tenderness. Left sinus exhibits no maxillary sinus tenderness and no frontal sinus tenderness.   Mouth/Throat: Uvula is midline, oropharynx is clear and moist and mucous membranes are normal. No trismus in the jaw. Normal dentition. No uvula swelling. No oropharyngeal exudate, posterior oropharyngeal edema or posterior oropharyngeal erythema.   "sinus pressure behind eyes"      Comments: "sinus pressure behind eyes"  Eyes: Conjunctivae and lids are normal. No scleral icterus.   Neck: Trachea normal, full passive range of motion without pain and phonation normal. Neck supple. No neck rigidity. No edema and no erythema present.   Cardiovascular: Normal rate, regular rhythm, normal heart sounds and normal pulses.   Pulmonary/Chest: Effort normal and breath sounds normal. No respiratory distress. She has no decreased breath sounds. She has no rhonchi.   Abdominal: Normal appearance.   Musculoskeletal: Normal range of motion.         General: No deformity.   Neurological: She is alert and oriented to person, place, and time. She exhibits normal muscle tone. Coordination normal.   Skin: Skin is warm, dry, intact, not diaphoretic and not pale. Psychiatric: Her speech is normal and behavior is normal. Judgment and thought content normal.   Nursing note and vitals reviewed.        Assessment:       1. Acute bacterial sinusitis        Plan:         Acute bacterial sinusitis  -     amoxicillin-clavulanate 875-125mg (AUGMENTIN) 875-125 mg per tablet; Take 1 tablet by mouth every 12 (twelve) hours. for 7 days  Dispense: 14 tablet; Refill: 0         Patient Instructions                                                             Sinusitis   If your condition worsens or fails to improve we recommend that you receive another evaluation at the ER immediately or contact your PCP to discuss your concerns or return here. You must understand that you've received an urgent care treatment only and that you may be released before all " "your medical problems are known or treated. You the patient will arrange for followup care as instructed.   If we discussed that I think your illness is viral it will not respond to antibiotics and it will last 10-14 days. However, if over the next few days the symptoms worsen start the antibiotics I have given you.   -  If we discussed that you require antibiotics start them now and take them to completion.   -  If you are female and on BCP and do take the antibiotics, use additional methods to prevent pregnancy while on the antibiotics and for one cycle after.   -  Flonase (fluticasone) is a nasal spray which is available over the counter and may help with your symptoms   -  Zyrtec D, Claritin D or allegra D can also help with symptoms of congestion and drainage.   -  If you have hypertension avoid using the "D" which is the decongestant. Instead, you can use Coricidin HBP for your cold and cough symptoms.     -  If you just have drainage you can take plain Zyrtec, Claritin or Allegra   -  If you just have a congested feeling you can take pseudoephedrine (unless you have high blood pressure) which you have to sign for behind the counter. Do not buy the phenylephrine which is on the shelf as it is not effective   -  Rest and fluids are also important.   -  Tylenol or ibuprofen can also be used as directed for pain unless you have an allergy to them or medical condition such as stomach ulcers, kidney or liver disease or blood thinners etc for which you should not be taking these type of medications.   -  If you are flying in the next few days Afrin nose drops for the airplane flight upon take off and landing may help. Other than at those times refrain from using afrin.   -  If you were prescribed a narcotic do not drive or operate heavy machinery while taking these medications.       "

## 2021-04-16 ENCOUNTER — PATIENT MESSAGE (OUTPATIENT)
Dept: RESEARCH | Facility: HOSPITAL | Age: 56
End: 2021-04-16

## 2021-04-22 DIAGNOSIS — D17.0 BENIGN LIPOMATOUS NEOPLASM OF SKIN AND SUBCUTANEOUS TISSUE OF HEAD, FACE AND NECK: Primary | ICD-10-CM

## 2021-04-23 ENCOUNTER — CLINICAL SUPPORT (OUTPATIENT)
Dept: URGENT CARE | Facility: CLINIC | Age: 56
End: 2021-04-23
Payer: COMMERCIAL

## 2021-04-23 DIAGNOSIS — Z11.59 SCREENING FOR VIRAL DISEASE: Primary | ICD-10-CM

## 2021-04-23 LAB
CTP QC/QA: YES
SARS-COV-2 RDRP RESP QL NAA+PROBE: NEGATIVE

## 2021-04-23 PROCEDURE — U0002 COVID-19 LAB TEST NON-CDC: HCPCS | Mod: QW,S$GLB,, | Performed by: NURSE PRACTITIONER

## 2021-04-23 PROCEDURE — 99211 OFF/OP EST MAY X REQ PHY/QHP: CPT | Mod: S$GLB,CS,, | Performed by: NURSE PRACTITIONER

## 2021-04-23 PROCEDURE — 99211 PR OFFICE/OUTPT VISIT, EST, LEVL I: ICD-10-PCS | Mod: S$GLB,CS,, | Performed by: NURSE PRACTITIONER

## 2021-04-23 PROCEDURE — U0002: ICD-10-PCS | Mod: QW,S$GLB,, | Performed by: NURSE PRACTITIONER

## 2021-07-30 ENCOUNTER — TELEPHONE (OUTPATIENT)
Dept: DERMATOLOGY | Facility: CLINIC | Age: 56
End: 2021-07-30

## 2021-08-07 ENCOUNTER — OFFICE VISIT (OUTPATIENT)
Dept: URGENT CARE | Facility: CLINIC | Age: 56
End: 2021-08-07
Payer: COMMERCIAL

## 2021-08-07 VITALS
SYSTOLIC BLOOD PRESSURE: 131 MMHG | DIASTOLIC BLOOD PRESSURE: 75 MMHG | OXYGEN SATURATION: 98 % | WEIGHT: 175 LBS | RESPIRATION RATE: 16 BRPM | BODY MASS INDEX: 32.2 KG/M2 | HEIGHT: 62 IN | HEART RATE: 102 BPM | TEMPERATURE: 99 F

## 2021-08-07 DIAGNOSIS — G89.29 CHRONIC BILATERAL LOWER ABDOMINAL PAIN: Primary | ICD-10-CM

## 2021-08-07 DIAGNOSIS — R10.32 CHRONIC BILATERAL LOWER ABDOMINAL PAIN: Primary | ICD-10-CM

## 2021-08-07 DIAGNOSIS — R10.31 CHRONIC BILATERAL LOWER ABDOMINAL PAIN: Primary | ICD-10-CM

## 2021-08-07 DIAGNOSIS — R30.0 DYSURIA: ICD-10-CM

## 2021-08-07 LAB
BILIRUB UR QL STRIP: NEGATIVE
GLUCOSE UR QL STRIP: NEGATIVE
KETONES UR QL STRIP: NEGATIVE
LEUKOCYTE ESTERASE UR QL STRIP: NEGATIVE
PH, POC UA: 6 (ref 5–8)
POC BLOOD, URINE: NEGATIVE
POC NITRATES, URINE: NEGATIVE
PROT UR QL STRIP: NEGATIVE
SP GR UR STRIP: 1.01 (ref 1–1.03)
UROBILINOGEN UR STRIP-ACNC: NORMAL (ref 0.1–1.1)

## 2021-08-07 PROCEDURE — 87186 SC STD MICRODIL/AGAR DIL: CPT | Performed by: NURSE PRACTITIONER

## 2021-08-07 PROCEDURE — 87088 URINE BACTERIA CULTURE: CPT | Performed by: NURSE PRACTITIONER

## 2021-08-07 PROCEDURE — 1159F PR MEDICATION LIST DOCUMENTED IN MEDICAL RECORD: ICD-10-PCS | Mod: CPTII,S$GLB,, | Performed by: NURSE PRACTITIONER

## 2021-08-07 PROCEDURE — 1160F PR REVIEW ALL MEDS BY PRESCRIBER/CLIN PHARMACIST DOCUMENTED: ICD-10-PCS | Mod: CPTII,S$GLB,, | Performed by: NURSE PRACTITIONER

## 2021-08-07 PROCEDURE — 1160F RVW MEDS BY RX/DR IN RCRD: CPT | Mod: CPTII,S$GLB,, | Performed by: NURSE PRACTITIONER

## 2021-08-07 PROCEDURE — 3075F SYST BP GE 130 - 139MM HG: CPT | Mod: CPTII,S$GLB,, | Performed by: NURSE PRACTITIONER

## 2021-08-07 PROCEDURE — 3078F PR MOST RECENT DIASTOLIC BLOOD PRESSURE < 80 MM HG: ICD-10-PCS | Mod: CPTII,S$GLB,, | Performed by: NURSE PRACTITIONER

## 2021-08-07 PROCEDURE — 87077 CULTURE AEROBIC IDENTIFY: CPT | Performed by: NURSE PRACTITIONER

## 2021-08-07 PROCEDURE — 87086 URINE CULTURE/COLONY COUNT: CPT | Performed by: NURSE PRACTITIONER

## 2021-08-07 PROCEDURE — 3078F DIAST BP <80 MM HG: CPT | Mod: CPTII,S$GLB,, | Performed by: NURSE PRACTITIONER

## 2021-08-07 PROCEDURE — 3008F BODY MASS INDEX DOCD: CPT | Mod: CPTII,S$GLB,, | Performed by: NURSE PRACTITIONER

## 2021-08-07 PROCEDURE — 99213 OFFICE O/P EST LOW 20 MIN: CPT | Mod: 25,S$GLB,, | Performed by: NURSE PRACTITIONER

## 2021-08-07 PROCEDURE — 3075F PR MOST RECENT SYSTOLIC BLOOD PRESS GE 130-139MM HG: ICD-10-PCS | Mod: CPTII,S$GLB,, | Performed by: NURSE PRACTITIONER

## 2021-08-07 PROCEDURE — 81003 POCT URINALYSIS, DIPSTICK, AUTOMATED, W/O SCOPE: ICD-10-PCS | Mod: QW,S$GLB,, | Performed by: NURSE PRACTITIONER

## 2021-08-07 PROCEDURE — 99213 PR OFFICE/OUTPT VISIT, EST, LEVL III, 20-29 MIN: ICD-10-PCS | Mod: 25,S$GLB,, | Performed by: NURSE PRACTITIONER

## 2021-08-07 PROCEDURE — 1159F MED LIST DOCD IN RCRD: CPT | Mod: CPTII,S$GLB,, | Performed by: NURSE PRACTITIONER

## 2021-08-07 PROCEDURE — 81003 URINALYSIS AUTO W/O SCOPE: CPT | Mod: QW,S$GLB,, | Performed by: NURSE PRACTITIONER

## 2021-08-07 PROCEDURE — 3008F PR BODY MASS INDEX (BMI) DOCUMENTED: ICD-10-PCS | Mod: CPTII,S$GLB,, | Performed by: NURSE PRACTITIONER

## 2021-08-07 RX ORDER — NAPROXEN 500 MG/1
500 TABLET ORAL 2 TIMES DAILY WITH MEALS
Qty: 14 TABLET | Refills: 0 | Status: SHIPPED | OUTPATIENT
Start: 2021-08-07 | End: 2021-08-14

## 2021-08-10 LAB — BACTERIA UR CULT: ABNORMAL

## 2021-08-16 ENCOUNTER — TELEPHONE (OUTPATIENT)
Dept: URGENT CARE | Facility: CLINIC | Age: 56
End: 2021-08-16

## 2021-08-20 ENCOUNTER — TELEPHONE (OUTPATIENT)
Dept: URGENT CARE | Facility: CLINIC | Age: 56
End: 2021-08-20

## 2021-08-26 DIAGNOSIS — R10.2 PERINEAL NEURALGIA: Primary | ICD-10-CM

## 2021-09-13 ENCOUNTER — TELEPHONE (OUTPATIENT)
Dept: GASTROENTEROLOGY | Facility: CLINIC | Age: 56
End: 2021-09-13

## 2022-03-29 ENCOUNTER — PATIENT MESSAGE (OUTPATIENT)
Dept: RESEARCH | Facility: HOSPITAL | Age: 57
End: 2022-03-29
Payer: COMMERCIAL

## 2022-09-05 ENCOUNTER — OFFICE VISIT (OUTPATIENT)
Dept: URGENT CARE | Facility: CLINIC | Age: 57
End: 2022-09-05
Payer: COMMERCIAL

## 2022-09-05 VITALS
HEIGHT: 62 IN | BODY MASS INDEX: 34.04 KG/M2 | DIASTOLIC BLOOD PRESSURE: 84 MMHG | WEIGHT: 185 LBS | HEART RATE: 83 BPM | TEMPERATURE: 99 F | RESPIRATION RATE: 20 BRPM | SYSTOLIC BLOOD PRESSURE: 144 MMHG | OXYGEN SATURATION: 95 %

## 2022-09-05 DIAGNOSIS — N30.00 ACUTE CYSTITIS WITHOUT HEMATURIA: ICD-10-CM

## 2022-09-05 DIAGNOSIS — R39.9 UTI SYMPTOMS: Primary | ICD-10-CM

## 2022-09-05 LAB
BILIRUB UR QL STRIP: NEGATIVE
GLUCOSE UR QL STRIP: NEGATIVE
KETONES UR QL STRIP: NEGATIVE
LEUKOCYTE ESTERASE UR QL STRIP: NEGATIVE
PH, POC UA: 5.5 (ref 5–8)
POC BLOOD, URINE: NEGATIVE
POC NITRATES, URINE: NEGATIVE
PROT UR QL STRIP: NEGATIVE
SP GR UR STRIP: 1.01 (ref 1–1.03)
UROBILINOGEN UR STRIP-ACNC: NORMAL (ref 0.1–1.1)

## 2022-09-05 PROCEDURE — 81003 POCT URINALYSIS, DIPSTICK, AUTOMATED, W/O SCOPE: ICD-10-PCS | Mod: QW,S$GLB,, | Performed by: FAMILY MEDICINE

## 2022-09-05 PROCEDURE — 81003 URINALYSIS AUTO W/O SCOPE: CPT | Mod: QW,S$GLB,, | Performed by: FAMILY MEDICINE

## 2022-09-05 PROCEDURE — 99213 OFFICE O/P EST LOW 20 MIN: CPT | Mod: S$GLB,,, | Performed by: FAMILY MEDICINE

## 2022-09-05 PROCEDURE — 1159F PR MEDICATION LIST DOCUMENTED IN MEDICAL RECORD: ICD-10-PCS | Mod: CPTII,S$GLB,, | Performed by: FAMILY MEDICINE

## 2022-09-05 PROCEDURE — 3008F BODY MASS INDEX DOCD: CPT | Mod: CPTII,S$GLB,, | Performed by: FAMILY MEDICINE

## 2022-09-05 PROCEDURE — 3079F PR MOST RECENT DIASTOLIC BLOOD PRESSURE 80-89 MM HG: ICD-10-PCS | Mod: CPTII,S$GLB,, | Performed by: FAMILY MEDICINE

## 2022-09-05 PROCEDURE — 3079F DIAST BP 80-89 MM HG: CPT | Mod: CPTII,S$GLB,, | Performed by: FAMILY MEDICINE

## 2022-09-05 PROCEDURE — 1159F MED LIST DOCD IN RCRD: CPT | Mod: CPTII,S$GLB,, | Performed by: FAMILY MEDICINE

## 2022-09-05 PROCEDURE — 3008F PR BODY MASS INDEX (BMI) DOCUMENTED: ICD-10-PCS | Mod: CPTII,S$GLB,, | Performed by: FAMILY MEDICINE

## 2022-09-05 PROCEDURE — 3077F SYST BP >= 140 MM HG: CPT | Mod: CPTII,S$GLB,, | Performed by: FAMILY MEDICINE

## 2022-09-05 PROCEDURE — 87086 URINE CULTURE/COLONY COUNT: CPT | Performed by: FAMILY MEDICINE

## 2022-09-05 PROCEDURE — 1160F RVW MEDS BY RX/DR IN RCRD: CPT | Mod: CPTII,S$GLB,, | Performed by: FAMILY MEDICINE

## 2022-09-05 PROCEDURE — 99213 PR OFFICE/OUTPT VISIT, EST, LEVL III, 20-29 MIN: ICD-10-PCS | Mod: S$GLB,,, | Performed by: FAMILY MEDICINE

## 2022-09-05 PROCEDURE — 3077F PR MOST RECENT SYSTOLIC BLOOD PRESSURE >= 140 MM HG: ICD-10-PCS | Mod: CPTII,S$GLB,, | Performed by: FAMILY MEDICINE

## 2022-09-05 PROCEDURE — 1160F PR REVIEW ALL MEDS BY PRESCRIBER/CLIN PHARMACIST DOCUMENTED: ICD-10-PCS | Mod: CPTII,S$GLB,, | Performed by: FAMILY MEDICINE

## 2022-09-05 RX ORDER — NITROFURANTOIN 25; 75 MG/1; MG/1
100 CAPSULE ORAL 2 TIMES DAILY
Qty: 14 CAPSULE | Refills: 0 | Status: SHIPPED | OUTPATIENT
Start: 2022-09-05 | End: 2022-09-12

## 2022-09-05 NOTE — PROGRESS NOTES
"Subjective:       Patient ID: Amber Khan is a 57 y.o. female.    Vitals:  height is 5' 2" (1.575 m) and weight is 83.9 kg (185 lb). Her oral temperature is 98.8 °F (37.1 °C). Her blood pressure is 144/84 (abnormal) and her pulse is 83. Her respiration is 20 and oxygen saturation is 95%.     Chief Complaint: Urinary Tract Infection    Pt presents with c/o  lower abd discomfort, increased frequency/urgency for last 2 weeks and dark urine since eysterday. Denies fever, chills,  back pain, N/V, vaginal discharge, weakness/dizziness.           Urinary Tract Infection   This is a recurrent problem. The current episode started more than 1 month ago. The problem occurs intermittently. The problem has been waxing and waning. The quality of the pain is described as aching. The pain is at a severity of 10/10. The pain is severe. There has been no fever. The fever has been present for Less than 1 day. She is Sexually active. There is No history of pyelonephritis. Associated symptoms include frequency and urgency. Pertinent negatives include no flank pain. Treatments tried: Dicyclomine 10 mg tabs. Her past medical history is significant for recurrent UTIs.     Genitourinary:  Positive for frequency and urgency. Negative for flank pain.     Objective:      Physical Exam   Constitutional: She is oriented to person, place, and time. She appears well-developed.   HENT:   Head: Normocephalic and atraumatic.   Nose: Nose normal. No nasal deformity. No epistaxis.   Mouth/Throat: Oropharynx is clear and moist and mucous membranes are normal.   Eyes: Lids are normal.   Neck: Trachea normal and phonation normal. Neck supple.   Cardiovascular: Normal pulses.   No murmur heard.  Pulmonary/Chest: Effort normal. No stridor. She has no wheezes. She has no rhonchi. She has no rales.   Abdominal: Normal appearance and bowel sounds are normal. She exhibits no distension. Soft. flat abdomen There is abdominal tenderness. There is no left CVA " tenderness and no right CVA tenderness.      Comments: +ve mild suprapubic TTP. BS +Ve   Neurological: She is alert and oriented to person, place, and time.   Skin: Skin is warm, dry and intact.   Psychiatric: Her speech is normal and behavior is normal.   Nursing note and vitals reviewed.      Assessment:       1. UTI symptoms    2. Acute cystitis without hematuria          Plan:         UTI symptoms  -     POCT Urinalysis, Dipstick, Automated, W/O Scope    Acute cystitis without hematuria  -     Urine culture    Other orders  -     nitrofurantoin, macrocrystal-monohydrate, (MACROBID) 100 MG capsule; Take 1 capsule (100 mg total) by mouth 2 (two) times daily. for 7 days  Dispense: 14 capsule; Refill: 0

## 2022-09-07 LAB — BACTERIA UR CULT: NO GROWTH

## 2022-09-09 ENCOUNTER — TELEPHONE (OUTPATIENT)
Dept: URGENT CARE | Facility: CLINIC | Age: 57
End: 2022-09-09
Payer: COMMERCIAL

## 2022-09-12 ENCOUNTER — TELEPHONE (OUTPATIENT)
Dept: ENDOSCOPY | Facility: HOSPITAL | Age: 57
End: 2022-09-12
Payer: COMMERCIAL

## 2022-09-13 NOTE — TELEPHONE ENCOUNTER
----- Message from Kiah Restrepo MA sent at 9/12/2022  4:57 PM CDT -----  Patient is in need of appt for abdominal pain Nothing on my end till December. Maybe appt can make accommodations? Please advise.      Israel Khan (Spouse)   [phone] (Mobile)

## 2022-11-22 ENCOUNTER — OFFICE VISIT (OUTPATIENT)
Dept: GASTROENTEROLOGY | Facility: CLINIC | Age: 57
End: 2022-11-22
Payer: COMMERCIAL

## 2022-11-22 VITALS — BODY MASS INDEX: 35.94 KG/M2 | WEIGHT: 195.31 LBS | HEIGHT: 62 IN

## 2022-11-22 DIAGNOSIS — R14.0 BLOATING: ICD-10-CM

## 2022-11-22 DIAGNOSIS — B96.81 CHRONIC HELICOBACTER PYLORI GASTRITIS: ICD-10-CM

## 2022-11-22 DIAGNOSIS — K29.50 CHRONIC HELICOBACTER PYLORI GASTRITIS: ICD-10-CM

## 2022-11-22 DIAGNOSIS — R10.13 DYSPEPSIA: ICD-10-CM

## 2022-11-22 DIAGNOSIS — K21.9 GASTROESOPHAGEAL REFLUX DISEASE, UNSPECIFIED WHETHER ESOPHAGITIS PRESENT: ICD-10-CM

## 2022-11-22 DIAGNOSIS — R10.9 ABDOMINAL PAIN, UNSPECIFIED ABDOMINAL LOCATION: Primary | ICD-10-CM

## 2022-11-22 PROCEDURE — 99205 OFFICE O/P NEW HI 60 MIN: CPT | Mod: S$GLB,,, | Performed by: INTERNAL MEDICINE

## 2022-11-22 PROCEDURE — 3008F PR BODY MASS INDEX (BMI) DOCUMENTED: ICD-10-PCS | Mod: CPTII,S$GLB,, | Performed by: INTERNAL MEDICINE

## 2022-11-22 PROCEDURE — 99205 PR OFFICE/OUTPT VISIT, NEW, LEVL V, 60-74 MIN: ICD-10-PCS | Mod: S$GLB,,, | Performed by: INTERNAL MEDICINE

## 2022-11-22 PROCEDURE — 3008F BODY MASS INDEX DOCD: CPT | Mod: CPTII,S$GLB,, | Performed by: INTERNAL MEDICINE

## 2022-11-22 PROCEDURE — 99999 PR PBB SHADOW E&M-EST. PATIENT-LVL III: CPT | Mod: PBBFAC,,, | Performed by: INTERNAL MEDICINE

## 2022-11-22 PROCEDURE — 4010F ACE/ARB THERAPY RXD/TAKEN: CPT | Mod: CPTII,S$GLB,, | Performed by: INTERNAL MEDICINE

## 2022-11-22 PROCEDURE — 99999 PR PBB SHADOW E&M-EST. PATIENT-LVL III: ICD-10-PCS | Mod: PBBFAC,,, | Performed by: INTERNAL MEDICINE

## 2022-11-22 PROCEDURE — 4010F PR ACE/ARB THEARPY RXD/TAKEN: ICD-10-PCS | Mod: CPTII,S$GLB,, | Performed by: INTERNAL MEDICINE

## 2022-11-22 RX ORDER — PANTOPRAZOLE SODIUM 40 MG/1
40 TABLET, DELAYED RELEASE ORAL DAILY
Qty: 90 TABLET | Refills: 3 | Status: SHIPPED | OUTPATIENT
Start: 2022-11-22 | End: 2023-03-16 | Stop reason: SDUPTHER

## 2022-11-22 RX ORDER — SUCRALFATE 1 G/1
1 TABLET ORAL 3 TIMES DAILY PRN
Qty: 90 TABLET | Refills: 3 | Status: SHIPPED | OUTPATIENT
Start: 2022-11-22 | End: 2023-02-22

## 2022-11-22 RX ORDER — SUCRALFATE 1 G/1
1 TABLET ORAL 4 TIMES DAILY
COMMUNITY
End: 2022-11-22 | Stop reason: SDUPTHER

## 2022-11-22 NOTE — PROGRESS NOTES
GASTROENTEROLOGY CLINIC NOTE    Reason for visit: The primary encounter diagnosis was Abdominal pain, unspecified abdominal location. Diagnoses of Chronic Helicobacter pylori gastritis, Bloating, Gastroesophageal reflux disease, unspecified whether esophagitis present, and Dyspepsia were also pertinent to this visit.  Referring provider/PCP: Vishal Womack NP    HPI:  Amber Khan is a 57 y.o. female here today for second opinion abdominal pain.   Previously seeing Dr. Leong (GI?) - near [facility]. Here for second opinion.     More than 1 -2 year at least, ongoing but not progressing, start slowly , lower abd pain, feels bloated. Constant in nature, every day. Has good BM, first thing in AM , daily or twice a day. Small volume stool, no blood.  Currently taking protonix and carafate - helps but doesn't solve the problem.     Recently had EGD and CT scan -   Was told pockets on colon and cyst on ovary.   Colonoscopy - about 7 years ago, hemorrhoids.   Egd - seemed to have been normal    Surgical  - just tubal ligation ; bladder lift      Prior Endoscopy:  EGD:   Colon:    (Portions of this note were dictated using voice recognition software and may contain dictation related errors in spelling/grammar/syntax not found on text review)    Review of Systems   Constitutional:  Negative for fever and malaise/fatigue.   Respiratory:  Negative for cough and shortness of breath.    Cardiovascular:  Negative for chest pain and palpitations.   Gastrointestinal:  Positive for abdominal pain. Negative for blood in stool, nausea and vomiting.   Neurological:  Negative for dizziness and headaches.     Past Medical History: has a past medical history of Asthma, Fibromyalgia, Hyperlipidemia, Hypertension, and Hypothyroidism.    Past Surgical History: has a past surgical history that includes Hysterectomy and Tubal ligation.    Family History:family history is not on file.    Allergies: Review of patient's allergies indicates:  No  Known Allergies    Social History: reports that she has never smoked. She has never been exposed to tobacco smoke. She has never used smokeless tobacco. She reports that she does not drink alcohol and does not use drugs.    Home medications:   Current Outpatient Medications on File Prior to Visit   Medication Sig Dispense Refill    albuterol (PROVENTIL/VENTOLIN HFA) 90 mcg/actuation inhaler ProAir HFA 90 mcg/actuation aerosol inhaler   Inhale 2 puffs every 4-6 hours by inhalation route as needed.      amLODIPine (NORVASC) 10 MG tablet       atorvastatin (LIPITOR) 20 MG tablet atorvastatin 20 mg tablet      azelastine (ASTELIN) 137 mcg (0.1 %) nasal spray azelastine 137 mcg (0.1 %) nasal spray aerosol   Spray 2 sprays twice a day by intranasal route.      dicyclomine (BENTYL) 20 mg tablet dicyclomine 20 mg tablet   Take 1 tablet 4 times a day by oral route for 30 days.      levothyroxine (SYNTHROID) 25 MCG tablet Take 25 mcg by mouth once daily.      levothyroxine (SYNTHROID) 25 MCG tablet Take 1 tablet by mouth.      lisinopril (PRINIVIL,ZESTRIL) 20 MG tablet       lisinopriL (PRINIVIL,ZESTRIL) 20 MG tablet Take 2 tablets by mouth.      lisinopril-hydrochlorothiazide (PRINZIDE,ZESTORETIC) 20-12.5 mg per tablet lisinopril 20 mg-hydrochlorothiazide 12.5 mg tablet   Take 1 tablet(s) every day by oral route.      LYRICA 75 mg capsule       meclizine (ANTIVERT) 25 mg tablet Take 1 tablet (25 mg total) by mouth 3 (three) times daily as needed for Dizziness. 30 tablet 0    rosuvastatin (CRESTOR) 20 MG tablet Take 20 mg by mouth once daily.      VITAMIN D2 50,000 unit capsule       [DISCONTINUED] pantoprazole (PROTONIX) 40 MG tablet Take 40 mg by mouth.      amitriptyline (ELAVIL) 10 MG tablet       [DISCONTINUED] albuterol (PROVENTIL/VENTOLIN HFA) 90 mcg/actuation inhaler INHALE 2 PUFFS INTO THE LUNGS EVERY 6 HOURS AS NEEDED FOR WHEEZING, COUGH AND SHORTNESS OF BREATH. (Patient not taking: No sig reported) 18 g 1     [DISCONTINUED] amLODIPine (NORVASC) 10 MG tablet Take 1 tablet by mouth.      [DISCONTINUED] azithromycin (ZITHROMAX Z-DICK) 250 MG tablet Take 2 tablets (500 mg) on  Day 1,  followed by 1 tablet (250 mg) once daily on Days 2 through 5. (Patient taking differently: Take 2 tablets (500 mg) on  Day 1,  followed by 1 tablet (250 mg) once daily on Days 2 through 5.) 6 tablet 0    [DISCONTINUED] budesonide-formoterol 160-4.5 mcg (SYMBICORT) 160-4.5 mcg/actuation HFAA Symbicort 160 mcg-4.5 mcg/actuation HFA aerosol inhaler   Inhale 2 puffs twice a day by inhalation route.      [DISCONTINUED] budesonide-formoterol 160-4.5 mcg (SYMBICORT) 160-4.5 mcg/actuation HFAA Symbicort 160 mcg-4.5 mcg/actuation HFA aerosol inhaler      [DISCONTINUED] DULoxetine (CYMBALTA) 30 MG capsule Take 1 capsule (30 mg total) by mouth 2 (two) times daily. 180 capsule 0    [DISCONTINUED] fluticasone propionate (FLONASE) 50 mcg/actuation nasal spray fluticasone propionate 50 mcg/actuation nasal spray,suspension      [DISCONTINUED] hyoscyamine (ANASPAZ,LEVSIN) 0.125 mg Tab Take 0.125 mg by mouth.      [DISCONTINUED] ibuprofen (ADVIL,MOTRIN) 800 MG tablet       [DISCONTINUED] ibuprofen (ADVIL,MOTRIN) 800 MG tablet ibuprofen 800 mg tablet      [DISCONTINUED] lansoprazole (PREVACID) 30 MG capsule lansoprazole 30 mg capsule,delayed release   Please specify directions, refills and quantity      [DISCONTINUED] meclizine (ANTIVERT) 25 mg tablet Take 1 tablet (25 mg total) by mouth 3 (three) times daily as needed for Dizziness. 30 tablet 0    [DISCONTINUED] methylPREDNISolone (MEDROL DOSEPACK) 4 mg tablet use as directed 1 Package 0    [DISCONTINUED] montelukast (SINGULAIR) 10 mg tablet       [DISCONTINUED] mupirocin (BACTROBAN) 2 % ointment Apply to affected area 3 times daily (Patient not taking: Reported on 11/22/2022) 22 g 1    [DISCONTINUED] pantoprazole (PROTONIX) 40 MG tablet Take 40 mg by mouth once daily.      [DISCONTINUED] sucralfate (CARAFATE) 1  "gram tablet Take 1 g by mouth 4 (four) times daily.       No current facility-administered medications on file prior to visit.       Vital signs:  Ht 5' 2" (1.575 m)   Wt 88.6 kg (195 lb 5.2 oz)   BMI 35.73 kg/m²     Physical Exam  Vitals reviewed.   Constitutional:       General: She is not in acute distress.  HENT:      Head: Normocephalic and atraumatic.   Eyes:      General: No scleral icterus.     Conjunctiva/sclera: Conjunctivae normal.   Abdominal:      General: There is no distension.      Palpations: Abdomen is soft. There is no mass.      Tenderness: There is no abdominal tenderness.      Hernia: No hernia is present.   Skin:     General: Skin is warm.      Coloration: Skin is not pale.      Findings: No rash.   Neurological:      Mental Status: She is oriented to person, place, and time.      Gait: Gait normal.   Psychiatric:         Mood and Affect: Mood normal.         Behavior: Behavior normal.       I have reviewed prior labs, imaging, notes from last month    Assessment:  1. Abdominal pain, unspecified abdominal location    2. Chronic Helicobacter pylori gastritis    3. Bloating    4. Gastroesophageal reflux disease, unspecified whether esophagitis present    5. Dyspepsia      2nd opinoin  Seems to be told she had diverticulosis and hx of Hpylori and given dx of IBS ; recent egd and CT scan were unreavealing of the cause of bloating , lower abd pain.  Consider constipation vs. Recurrent HP vs. Dietary factors vs. others    Plan:  Orders Placed This Encounter    X-Ray Abdomen AP 1 View    H. pylori antigen, stool    pantoprazole (PROTONIX) 40 MG tablet    sucralfate (CARAFATE) 1 gram tablet     Check HP stool  Start back the protonix and carafate    Check Abd xray stool burden    Handout on bloating tips  Handout on low FODMAP    Obtain dr joshua records    RTC 2 months   - can cosnider emtying study, consider sending to gi dietician.    Luis Carlos Wilkins MD  [facility] " total of 63 minutes were spent during this encounter including reviewing records, interviewing, examining patient, and counseling / coordination of care.

## 2022-11-22 NOTE — LETTER
November 22, 2022        Vishal Womack, DARIELA  [address]  [address]  [facility] [address]             [place] [facility]  [address], [address]  [facility] [address]  Phone: [phone]   Patient: Amber Khan   MR Number: [id number]   YOB: 1965   Date of Visit: 11/22/2022       Dear Dr. Womack:    Thank you for referring Amber Khan to me for evaluation. Below are the relevant portions of my assessment and plan of care.            If you have questions, please do not hesitate to call me. I look forward to following Amber along with you.    Sincerely,      Luis Carlos Wilkins MD           CC    No Recipients

## 2022-11-22 NOTE — PATIENT INSTRUCTIONS
Look into low FODMAP foods  - can find information online.    We will ask dr gomez to send the records

## 2022-12-01 ENCOUNTER — HOSPITAL ENCOUNTER (OUTPATIENT)
Dept: RADIOLOGY | Facility: HOSPITAL | Age: 57
Discharge: HOME | End: 2022-12-01
Attending: INTERNAL MEDICINE
Payer: COMMERCIAL

## 2022-12-01 DIAGNOSIS — R14.0 BLOATING: ICD-10-CM

## 2022-12-01 DIAGNOSIS — R10.9 ABDOMINAL PAIN, UNSPECIFIED ABDOMINAL LOCATION: ICD-10-CM

## 2022-12-01 PROCEDURE — 74018 RADEX ABDOMEN 1 VIEW: CPT | Mod: TC,FY

## 2022-12-01 PROCEDURE — 74018 RADEX ABDOMEN 1 VIEW: CPT | Mod: 26,,, | Performed by: RADIOLOGY

## 2022-12-01 PROCEDURE — 74018 XR ABDOMEN AP 1 VIEW: ICD-10-PCS | Mod: 26,,, | Performed by: RADIOLOGY

## 2022-12-02 DIAGNOSIS — R74.01 ELEVATED SGOT (AST): Primary | ICD-10-CM

## 2022-12-14 ENCOUNTER — DOCUMENTATION ONLY (OUTPATIENT)
Dept: GASTROENTEROLOGY | Facility: CLINIC | Age: 57
End: 2022-12-14
Payer: COMMERCIAL

## 2022-12-14 ENCOUNTER — TELEPHONE (OUTPATIENT)
Dept: GASTROENTEROLOGY | Facility: CLINIC | Age: 57
End: 2022-12-14
Payer: COMMERCIAL

## 2022-12-14 NOTE — TELEPHONE ENCOUNTER
Records received from Dr. Leong with [facility] and summarized as follows:    Office Visit 3/24/2022:  Continued lower abdominal pain and cramping. Taking PPI in am and herbal tea in evening. Tried Levsin in past without improvement.     Recommend CT and Bentyl 10 mg every 6 hrs PRN    CT Abdomen Pelvis w/wo Contrast 3/30/2022  Hepatic steatosis; small hepatic cyst  Prominence of spleen with granulomatous calcifications  Right ovarian cyst  Minor sigmoid diverticulosis    EGD 2/16/2022  Regular Z-line  Patchy moderate inflammation characterized by congestion, erosions, erythema, friability, and granularity in fundus.   Normal duodenum and duodenal bulb.     Pathology: mild reactive gastropathy. No active inflammation, Negative H.pylori, No intestinal metaplasia.           Patient saw Dr. Wilkins 11/22/2022 for second opinion regarding abdominal pain.      Per Note: More than 1 -2 year at least, ongoing but not progressing, start slowly , lower abd pain, feels bloated. Constant in nature, every day. Has good BM, first thing in AM , daily or twice a day. Small volume stool, no blood.  Currently taking protonix and carafate - helps but doesn't solve the problem.     Plan: H.pylori stool   Abdominal xray    Restart Protonix and carafate   Handouts given on low FODMAP and bloating tips    Consider NMS, GI dietician referral     Has follow up appointment scheduled with Dr. Wilkins 1/19/2023

## 2022-12-15 ENCOUNTER — HOSPITAL ENCOUNTER (OUTPATIENT)
Dept: RADIOLOGY | Facility: HOSPITAL | Age: 57
Discharge: HOME | End: 2022-12-15
Attending: FAMILY MEDICINE
Payer: COMMERCIAL

## 2022-12-15 DIAGNOSIS — R74.01 ELEVATED SGOT (AST): ICD-10-CM

## 2022-12-15 PROCEDURE — 76705 ECHO EXAM OF ABDOMEN: CPT | Mod: 26,,, | Performed by: RADIOLOGY

## 2022-12-15 PROCEDURE — 76705 US ABDOMEN LIMITED_LIVER: ICD-10-PCS | Mod: 26,,, | Performed by: RADIOLOGY

## 2022-12-15 PROCEDURE — 76705 ECHO EXAM OF ABDOMEN: CPT | Mod: TC

## 2023-01-19 ENCOUNTER — OFFICE VISIT (OUTPATIENT)
Dept: GASTROENTEROLOGY | Facility: CLINIC | Age: 58
End: 2023-01-19
Payer: COMMERCIAL

## 2023-01-19 VITALS — WEIGHT: 188.94 LBS | BODY MASS INDEX: 34.77 KG/M2 | HEIGHT: 62 IN

## 2023-01-19 DIAGNOSIS — R14.0 BLOATING: ICD-10-CM

## 2023-01-19 DIAGNOSIS — K21.9 GASTROESOPHAGEAL REFLUX DISEASE, UNSPECIFIED WHETHER ESOPHAGITIS PRESENT: ICD-10-CM

## 2023-01-19 DIAGNOSIS — R10.9 ABDOMINAL PAIN, UNSPECIFIED ABDOMINAL LOCATION: Primary | ICD-10-CM

## 2023-01-19 PROCEDURE — 99214 OFFICE O/P EST MOD 30 MIN: CPT | Mod: S$GLB,,, | Performed by: INTERNAL MEDICINE

## 2023-01-19 PROCEDURE — 3008F BODY MASS INDEX DOCD: CPT | Mod: CPTII,S$GLB,, | Performed by: INTERNAL MEDICINE

## 2023-01-19 PROCEDURE — 99214 PR OFFICE/OUTPT VISIT, EST, LEVL IV, 30-39 MIN: ICD-10-PCS | Mod: S$GLB,,, | Performed by: INTERNAL MEDICINE

## 2023-01-19 PROCEDURE — 1159F MED LIST DOCD IN RCRD: CPT | Mod: CPTII,S$GLB,, | Performed by: INTERNAL MEDICINE

## 2023-01-19 PROCEDURE — 99999 PR PBB SHADOW E&M-EST. PATIENT-LVL III: ICD-10-PCS | Mod: PBBFAC,,, | Performed by: INTERNAL MEDICINE

## 2023-01-19 PROCEDURE — 3008F PR BODY MASS INDEX (BMI) DOCUMENTED: ICD-10-PCS | Mod: CPTII,S$GLB,, | Performed by: INTERNAL MEDICINE

## 2023-01-19 PROCEDURE — 1159F PR MEDICATION LIST DOCUMENTED IN MEDICAL RECORD: ICD-10-PCS | Mod: CPTII,S$GLB,, | Performed by: INTERNAL MEDICINE

## 2023-01-19 PROCEDURE — 99999 PR PBB SHADOW E&M-EST. PATIENT-LVL III: CPT | Mod: PBBFAC,,, | Performed by: INTERNAL MEDICINE

## 2023-01-19 NOTE — PROGRESS NOTES
GASTROENTEROLOGY CLINIC NOTE    Reason for visit: The primary encounter diagnosis was Abdominal pain, unspecified abdominal location. Diagnoses of Bloating and Gastroesophageal reflux disease, unspecified whether esophagitis present were also pertinent to this visit.  Referring provider/PCP: Vishal Womack NP    HPI:  Amber Khan is a 57 y.o. female here today for second opinion abdominal pain.   Previously seeing Dr. Leong (GI?) - near [facility]. Here for second opinion.     Interval  Last seen about 3 months ago  Restarted protonix and carafate. Didn't help much.  Her x-ray did show some retained stool mainly on the right side of the colon.  Her H pylori stool was negative.  She continues with lower abdominal distress and bloating and gas at times.  It does seem that sometimes having a good bowel movement does improve this but this is almost a daily phenomenon.  She usually has a bowel movement once a day.  Tried taking psyllium husk otc 'colon cleanse' and gives her stools all day. This somewhat improves the symptoms.  Ongoing for more than a year at least., see below for prior outside workup      ==========================  Initial visit 11/2022  More than 1 -2 year at least, ongoing but not progressing, start slowly , lower abd pain, feels bloated. Constant in nature, every day. Has good BM, first thing in AM , daily or twice a day. Small volume stool, no blood.  Currently taking protonix and carafate - helps but doesn't solve the problem.     Recently had EGD and CT scan -   Was told pockets on colon and cyst on ovary.   Colonoscopy - about 7 years ago, hemorrhoids.   Egd - seemed to have been normal    Surgical  - just tubal ligation ; bladder lift      Prior Endoscopy:  EGD:   Colon:    (Portions of this note were dictated using voice recognition software and may contain dictation related errors in spelling/grammar/syntax not found on text review)    Review of Systems   Constitutional:  Negative for  fever and malaise/fatigue.   Respiratory:  Negative for cough and shortness of breath.    Cardiovascular:  Negative for chest pain and palpitations.   Gastrointestinal:  Positive for abdominal pain. Negative for blood in stool, nausea and vomiting.   Neurological:  Negative for dizziness and headaches.     Past Medical History: has a past medical history of Asthma, Fibromyalgia, Hyperlipidemia, Hypertension, and Hypothyroidism.    Past Surgical History: has a past surgical history that includes Hysterectomy and Tubal ligation.    Family History:family history is not on file.    Allergies: Review of patient's allergies indicates:  No Known Allergies    Social History: reports that she has never smoked. She has never been exposed to tobacco smoke. She has never used smokeless tobacco. She reports that she does not drink alcohol and does not use drugs.    Home medications:   Current Outpatient Medications on File Prior to Visit   Medication Sig Dispense Refill    albuterol (PROVENTIL/VENTOLIN HFA) 90 mcg/actuation inhaler ProAir HFA 90 mcg/actuation aerosol inhaler   Inhale 2 puffs every 4-6 hours by inhalation route as needed.      amLODIPine (NORVASC) 10 MG tablet       atorvastatin (LIPITOR) 20 MG tablet atorvastatin 20 mg tablet      azelastine (ASTELIN) 137 mcg (0.1 %) nasal spray azelastine 137 mcg (0.1 %) nasal spray aerosol   Spray 2 sprays twice a day by intranasal route.      dicyclomine (BENTYL) 20 mg tablet dicyclomine 20 mg tablet   Take 1 tablet 4 times a day by oral route for 30 days.      levothyroxine (SYNTHROID) 25 MCG tablet Take 25 mcg by mouth once daily.      lisinopril (PRINIVIL,ZESTRIL) 20 MG tablet       meclizine (ANTIVERT) 25 mg tablet Take 1 tablet (25 mg total) by mouth 3 (three) times daily as needed for Dizziness. 30 tablet 0    pantoprazole (PROTONIX) 40 MG tablet Take 1 tablet (40 mg total) by mouth once daily. 90 tablet 3    VITAMIN D2 50,000 unit capsule       [DISCONTINUED]  "levothyroxine (SYNTHROID) 25 MCG tablet Take 1 tablet by mouth.      [DISCONTINUED] lisinopriL (PRINIVIL,ZESTRIL) 20 MG tablet Take 2 tablets by mouth.      amitriptyline (ELAVIL) 10 MG tablet       lisinopril-hydrochlorothiazide (PRINZIDE,ZESTORETIC) 20-12.5 mg per tablet lisinopril 20 mg-hydrochlorothiazide 12.5 mg tablet   Take 1 tablet(s) every day by oral route.      sucralfate (CARAFATE) 1 gram tablet Take 1 tablet (1 g total) by mouth 3 (three) times daily as needed (abd symptoms). (Patient not taking: Reported on 1/19/2023) 90 tablet 3    [DISCONTINUED] LYRICA 75 mg capsule       [DISCONTINUED] rosuvastatin (CRESTOR) 20 MG tablet Take 20 mg by mouth once daily.       No current facility-administered medications on file prior to visit.       Vital signs:  Ht 5' 2" (1.575 m)   Wt 85.7 kg (188 lb 15 oz)   BMI 34.56 kg/m²     Physical Exam  Vitals reviewed.   Constitutional:       General: She is not in acute distress.  HENT:      Head: Normocephalic and atraumatic.   Eyes:      General: No scleral icterus.     Conjunctiva/sclera: Conjunctivae normal.   Abdominal:      General: There is no distension.      Palpations: Abdomen is soft. There is no mass.      Tenderness: There is no abdominal tenderness.      Hernia: No hernia is present.   Skin:     General: Skin is warm.      Coloration: Skin is not pale.      Findings: No rash.   Neurological:      Mental Status: She is oriented to person, place, and time.      Gait: Gait normal.   Psychiatric:         Mood and Affect: Mood normal.         Behavior: Behavior normal.       I have reviewed prior labs, imaging, notes from last month    Assessment:  1. Abdominal pain, unspecified abdominal location    2. Bloating    3. Gastroesophageal reflux disease, unspecified whether esophagitis present      2nd opinoin  Seems to be told she had diverticulosis and hx of Hpylori and given dx of IBS ; recent egd and CT scan were unreavealing of the cause of bloating , lower abd " pain.    Her exam is reassuring and benign. CT recently normal (outside)  I suspect this is IBS    Plan:       Rifaximin given for 14 days    If doesn't help, then given linzess 290 to trial    IF not resolving after either, will consider colonscopy.    Rtc based on above.    Luis Carlos Wilkins MD  [facility]

## 2023-01-19 NOTE — PATIENT INSTRUCTIONS
First, take 2 weeks of rifaximin, one pill, three times - for the bacteria in the gut    Then wait a week or so, if you feel better, dont do anything else    If still problems, then start trying the linzess - every day, to see if helps the bowels      Call us after 1 month, if still problems, schedule colonoscopy.

## 2023-02-23 ENCOUNTER — TELEPHONE (OUTPATIENT)
Dept: GASTROENTEROLOGY | Facility: CLINIC | Age: 58
End: 2023-02-23
Payer: COMMERCIAL

## 2023-02-23 NOTE — TELEPHONE ENCOUNTER
Spoke with patient and she is still having abd pain. Patient stated that she completed the Xifaxan for 14 days, but does not know what she should do about the pain.

## 2023-02-23 NOTE — TELEPHONE ENCOUNTER
----- Message from Sergio Gandara sent at 2/23/2023 11:37 AM CST -----  Contact: pt  Type:  Sooner Apoointment Request    Caller is requesting a sooner appointment.  Caller declined first available appointment listed below.  Caller will not accept being placed on the waitlist and is requesting a message be sent to doctor.  Name of Caller:pt   When is the first available appointment? N/a  Symptoms:follow up   Would the patient rather a call back or a response via [facility]? Call   Best Call Back Number:[phone]  Additional Information:

## 2023-03-14 ENCOUNTER — TELEPHONE (OUTPATIENT)
Dept: GASTROENTEROLOGY | Facility: CLINIC | Age: 58
End: 2023-03-14
Payer: COMMERCIAL

## 2023-03-14 NOTE — TELEPHONE ENCOUNTER
Patient would like an appointment with MD to discuss her symptoms. Appt scheduled on 3/16/23 at 8:40am.

## 2023-03-14 NOTE — TELEPHONE ENCOUNTER
----- Message from Beny Torrez sent at 3/14/2023 10:23 AM CDT -----  Contact: pt  .Type:  Sooner Apoointment Request    Caller is requesting a sooner appointment.  Caller declined first available appointment listed below.  Caller will not accept being placed on the waitlist and is requesting a message be sent to doctor.  Name of Caller:pt  When is the first available appointment?  Symptoms: abdominal pain  Would the patient rather a call back or a response via [facility]?  Call back  Best Call Back Number:[phone]  Additional Information:

## 2023-03-16 ENCOUNTER — OFFICE VISIT (OUTPATIENT)
Dept: GASTROENTEROLOGY | Facility: CLINIC | Age: 58
End: 2023-03-16
Payer: COMMERCIAL

## 2023-03-16 VITALS — HEIGHT: 62 IN | WEIGHT: 186.5 LBS | BODY MASS INDEX: 34.32 KG/M2

## 2023-03-16 DIAGNOSIS — Z12.11 SCREEN FOR COLON CANCER: ICD-10-CM

## 2023-03-16 DIAGNOSIS — K21.9 GASTROESOPHAGEAL REFLUX DISEASE, UNSPECIFIED WHETHER ESOPHAGITIS PRESENT: ICD-10-CM

## 2023-03-16 DIAGNOSIS — K58.8 OTHER IRRITABLE BOWEL SYNDROME: ICD-10-CM

## 2023-03-16 DIAGNOSIS — R14.0 BLOATING: ICD-10-CM

## 2023-03-16 DIAGNOSIS — R10.9 ABDOMINAL PAIN, UNSPECIFIED ABDOMINAL LOCATION: Primary | ICD-10-CM

## 2023-03-16 PROCEDURE — 99214 OFFICE O/P EST MOD 30 MIN: CPT | Mod: S$GLB,,, | Performed by: INTERNAL MEDICINE

## 2023-03-16 PROCEDURE — 99999 PR PBB SHADOW E&M-EST. PATIENT-LVL III: CPT | Mod: PBBFAC,,, | Performed by: INTERNAL MEDICINE

## 2023-03-16 PROCEDURE — 99999 PR PBB SHADOW E&M-EST. PATIENT-LVL III: ICD-10-PCS | Mod: PBBFAC,,, | Performed by: INTERNAL MEDICINE

## 2023-03-16 PROCEDURE — 99214 PR OFFICE/OUTPT VISIT, EST, LEVL IV, 30-39 MIN: ICD-10-PCS | Mod: S$GLB,,, | Performed by: INTERNAL MEDICINE

## 2023-03-16 RX ORDER — PANTOPRAZOLE SODIUM 40 MG/1
40 TABLET, DELAYED RELEASE ORAL DAILY
Qty: 90 TABLET | Refills: 3 | Status: SHIPPED | OUTPATIENT
Start: 2023-03-16 | End: 2023-09-28 | Stop reason: SDUPTHER

## 2023-03-16 RX ORDER — POLYETHYLENE GLYCOL 3350, SODIUM SULFATE ANHYDROUS, SODIUM BICARBONATE, SODIUM CHLORIDE, POTASSIUM CHLORIDE 236; 22.74; 6.74; 5.86; 2.97 G/4L; G/4L; G/4L; G/4L; G/4L
4 POWDER, FOR SOLUTION ORAL ONCE
Qty: 4000 ML | Refills: 0 | Status: SHIPPED | OUTPATIENT
Start: 2023-03-16 | End: 2023-03-16

## 2023-03-16 RX ORDER — NORTRIPTYLINE HYDROCHLORIDE 25 MG/1
25 CAPSULE ORAL NIGHTLY
Qty: 30 CAPSULE | Refills: 11 | Status: SHIPPED | OUTPATIENT
Start: 2023-03-16 | End: 2024-03-18

## 2023-03-16 NOTE — PATIENT INSTRUCTIONS
GOLYTELY/ COLYTE/ NULYTELY Prep Instructions    [facility] [address]  Clinic Office [phone]  Endoscopy Lab [phone]    You are scheduled for a Colonoscopy with Dr. Wilkins on 4/14/23 at [facility] in [place].    Check in at the Hospital -1st floor, Information desk.   Call [phone] to reschedule.    An adult friend/family member must come with you to drive you home.  You cannot drive, take a taxi, Uber/Lyft or bus to leave the Endoscopy Center alone.  If you do not have someone to drive you home, your test will be cancelled.     Please follow the directions of your doctor if you take any pills that thin your blood. If you take these meds: Aggrenox, Brilinta, Effient, Eliquis, Lovenox, Plavix, Pletal, Pradaxa, Ticilid, Xarelto or Coumadin, let the doctor's office know.    DON'T: On the morning of the test do not take insulin or pills for diabetes.     DO: On the morning of the test, do take any pills for blood pressure, heart, anti-rejection and or seizures with a small sip of water. Bring any inhalers with you.    To have a good prep, you must follow these instructions - please do not use the directions from the pharmacy.    The doctor will send a prescription for the Golytely.      The Day Before the test:    You can only drink CLEAR LIQUIDS the whole day before your test.  You can't eat any food for the whole day.    You CAN have:  Water, Coffee or decaf coffee (no milk or cream)  Tea  Soft drinks - regular and sugar free  Jello (green or yellow)  Apple Juice, white grape juice, white cranberry juice  Gatorade, Power Aid, Crystal Light, Ellis Aid  Lemonade and Limeade  Bouillon, clear soup  Snowball, popsicles  YOU CAN'T DRINK ANYTHING RED, PURPLE ORANGE OR BLUE   YOU CAN'T DRINK ALCOHOL  ONLY DRINK WHAT IS ON THE LIST      At 12 noon,   Add water up to the line on the jug of GOLYTELY (should be 1 gallon when done).  You can add a packet of yellow/green powder drink  mix to the jug.   Put the bottle in the refrigerator if you prefer. It should taste better if it is cold. Do NOT put this solution over ice. It is ok to drink with a straw.    At 5 pm the NIGHT before your test:    Drink 1 glass (8 ounces) every 10 minutes until 1/2 of the jug is finished.  Put the jug back in the refrigerator after you finish the first half, and don't drink any more until 5 hours before you come to the hospital (see below for more specific details).    You can continue to drink clear liquids until you go to sleep.    The Day of the test - We will call you 2 days before your test to tell you what time to get there.    5 hours before you come to the hospital (this may be in the middle of the night)  Drink 1 glass (8 ounces) every 10 minutes until the jug is finished.     YOU CAN'T EAT OR DRINK ANYTHING ELSE ONCE YOU FINISH THE PREP    Leave all valuables and jewelry at home. You will be at the hospital for 2-4 hours.    Call the Endoscopy department at [phone] with any questions about your procedure.

## 2023-03-16 NOTE — PROGRESS NOTES
GASTROENTEROLOGY CLINIC NOTE    Reason for visit: The primary encounter diagnosis was Abdominal pain, unspecified abdominal location. Diagnoses of Screen for colon cancer, Bloating, Other irritable bowel syndrome, and Gastroesophageal reflux disease, unspecified whether esophagitis present were also pertinent to this visit.  Referring provider/PCP: Vishal Womack NP    HPI:  Amber Khan is a 58 y.o. female here today for second opinion abdominal pain.   Previously seeing Dr. Leong (GI?) - near [facility]. Here for second opinion.     Interval  She continues with lower abdominal discomfort, she states it is getting worse, associated with bloating and gas and worse after having a bowel movement, she mentions having at least 1 or 2 bowel movements a day and feeling like she is emptying.  This does not seem to relieve the pain.  She does not notice any alleviating factors.  This pain happens morning or night there is no timing to it.  Rifaximin 14 days didn't help  Linzess 290 didn't help  Bentyl doesn't help  Nothing helps the pain    ==========================  Initial visit 11/2022  More than 1 -2 year at least, ongoing but not progressing, start slowly , lower abd pain, feels bloated. Constant in nature, every day. Has good BM, first thing in AM , daily or twice a day. Small volume stool, no blood.  Currently taking protonix and carafate - helps but doesn't solve the problem.     Prior Endoscopy:    Recently had EGD and CT scan -   Was told pockets on colon and cyst on ovary.   Colonoscopy - about 7 years ago, hemorrhoids.   Egd - seemed to have been normal    Surgical  - just tubal ligation ; bladder lift          (Portions of this note were dictated using voice recognition software and may contain dictation related errors in spelling/grammar/syntax not found on text review)    Review of Systems   Constitutional:  Negative for fever and malaise/fatigue.   Respiratory:  Negative for cough and shortness of breath.     Cardiovascular:  Negative for chest pain and palpitations.   Gastrointestinal:  Positive for abdominal pain. Negative for blood in stool, nausea and vomiting.   Neurological:  Negative for dizziness and headaches.     Past Medical History: has a past medical history of Asthma, Fibromyalgia, Hyperlipidemia, Hypertension, and Hypothyroidism.    Past Surgical History: has a past surgical history that includes Hysterectomy and Tubal ligation.    Family History:family history is not on file.    Allergies: Review of patient's allergies indicates:  No Known Allergies    Social History: reports that she has never smoked. She has never been exposed to tobacco smoke. She has never used smokeless tobacco. She reports that she does not drink alcohol and does not use drugs.    Home medications:   Current Outpatient Medications on File Prior to Visit   Medication Sig Dispense Refill    albuterol (PROVENTIL/VENTOLIN HFA) 90 mcg/actuation inhaler ProAir HFA 90 mcg/actuation aerosol inhaler   Inhale 2 puffs every 4-6 hours by inhalation route as needed.      amLODIPine (NORVASC) 10 MG tablet       atorvastatin (LIPITOR) 20 MG tablet atorvastatin 20 mg tablet      azelastine (ASTELIN) 137 mcg (0.1 %) nasal spray azelastine 137 mcg (0.1 %) nasal spray aerosol   Spray 2 sprays twice a day by intranasal route.      dicyclomine (BENTYL) 20 mg tablet dicyclomine 20 mg tablet   Take 1 tablet 4 times a day by oral route for 30 days.      levothyroxine (SYNTHROID) 25 MCG tablet Take 25 mcg by mouth once daily.      lisinopril (PRINIVIL,ZESTRIL) 20 MG tablet       lisinopril-hydrochlorothiazide (PRINZIDE,ZESTORETIC) 20-12.5 mg per tablet lisinopril 20 mg-hydrochlorothiazide 12.5 mg tablet   Take 1 tablet(s) every day by oral route.      meclizine (ANTIVERT) 25 mg tablet Take 1 tablet (25 mg total) by mouth 3 (three) times daily as needed for Dizziness. 30 tablet 0    sucralfate (CARAFATE) 1 gram tablet TAKE 1 TABLET (1 G TOTAL) BY MOUTH 3  "(THREE) TIMES DAILY AS NEEDED (ABD SYMPTOMS). 270 tablet 1    VITAMIN D2 50,000 unit capsule       [DISCONTINUED] pantoprazole (PROTONIX) 40 MG tablet Take 1 tablet (40 mg total) by mouth once daily. 90 tablet 3    [DISCONTINUED] amitriptyline (ELAVIL) 10 MG tablet        No current facility-administered medications on file prior to visit.       Vital signs:  Ht 5' 2" (1.575 m)   Wt 84.6 kg (186 lb 8.2 oz)   BMI 34.11 kg/m²     Physical Exam  Vitals reviewed.   Constitutional:       General: She is not in acute distress.  HENT:      Head: Normocephalic and atraumatic.   Eyes:      General: No scleral icterus.     Conjunctiva/sclera: Conjunctivae normal.   Abdominal:      General: There is no distension.      Palpations: Abdomen is soft. There is no mass.      Tenderness: There is no abdominal tenderness.      Hernia: No hernia is present.      Comments: TTP lower quadrants without rigidity or guarding  On carnetts, there is still present pain mainly over RLQ as well   Skin:     General: Skin is warm.      Coloration: Skin is not pale.      Findings: No rash.   Neurological:      Mental Status: She is oriented to person, place, and time.      Gait: Gait normal.   Psychiatric:         Mood and Affect: Mood normal.         Behavior: Behavior normal.       I have reviewed prior labs, imaging, notes from last month    Assessment:  1. Abdominal pain, unspecified abdominal location    2. Screen for colon cancer    3. Bloating    4. Other irritable bowel syndrome    5. Gastroesophageal reflux disease, unspecified whether esophagitis present      2nd opinoin  Continued abd pain without obvious etiology despite exhaustive workup    Her exam is reassuring and benign. CT recently normal (outside)  I suspect this is IBS    Plan:  Orders Placed This Encounter    nortriptyline (PAMELOR) 25 MG capsule    polyethylene glycol (GOLYTELY) 236-22.74-6.74 -5.86 gram suspension    pantoprazole (PROTONIX) 40 MG tablet    Case Request " Endoscopy: COLONOSCOPY     Plan colonoscopy    Trial pamelor nightly    Rtc based on above.    Lui sCarlos Wilkins MD  [facility]

## 2023-03-16 NOTE — H&P (VIEW-ONLY)
GASTROENTEROLOGY CLINIC NOTE    Reason for visit: The primary encounter diagnosis was Abdominal pain, unspecified abdominal location. Diagnoses of Screen for colon cancer, Bloating, Other irritable bowel syndrome, and Gastroesophageal reflux disease, unspecified whether esophagitis present were also pertinent to this visit.  Referring provider/PCP: Vishal Womack NP    HPI:  Amber Khan is a 58 y.o. female here today for second opinion abdominal pain.   Previously seeing Dr. Leong (GI?) - near [facility]. Here for second opinion.     Interval  She continues with lower abdominal discomfort, she states it is getting worse, associated with bloating and gas and worse after having a bowel movement, she mentions having at least 1 or 2 bowel movements a day and feeling like she is emptying.  This does not seem to relieve the pain.  She does not notice any alleviating factors.  This pain happens morning or night there is no timing to it.  Rifaximin 14 days didn't help  Linzess 290 didn't help  Bentyl doesn't help  Nothing helps the pain    ==========================  Initial visit 11/2022  More than 1 -2 year at least, ongoing but not progressing, start slowly , lower abd pain, feels bloated. Constant in nature, every day. Has good BM, first thing in AM , daily or twice a day. Small volume stool, no blood.  Currently taking protonix and carafate - helps but doesn't solve the problem.     Prior Endoscopy:    Recently had EGD and CT scan -   Was told pockets on colon and cyst on ovary.   Colonoscopy - about 7 years ago, hemorrhoids.   Egd - seemed to have been normal    Surgical  - just tubal ligation ; bladder lift          (Portions of this note were dictated using voice recognition software and may contain dictation related errors in spelling/grammar/syntax not found on text review)    Review of Systems   Constitutional:  Negative for fever and malaise/fatigue.   Respiratory:  Negative for cough and shortness of breath.     Cardiovascular:  Negative for chest pain and palpitations.   Gastrointestinal:  Positive for abdominal pain. Negative for blood in stool, nausea and vomiting.   Neurological:  Negative for dizziness and headaches.     Past Medical History: has a past medical history of Asthma, Fibromyalgia, Hyperlipidemia, Hypertension, and Hypothyroidism.    Past Surgical History: has a past surgical history that includes Hysterectomy and Tubal ligation.    Family History:family history is not on file.    Allergies: Review of patient's allergies indicates:  No Known Allergies    Social History: reports that she has never smoked. She has never been exposed to tobacco smoke. She has never used smokeless tobacco. She reports that she does not drink alcohol and does not use drugs.    Home medications:   Current Outpatient Medications on File Prior to Visit   Medication Sig Dispense Refill    albuterol (PROVENTIL/VENTOLIN HFA) 90 mcg/actuation inhaler ProAir HFA 90 mcg/actuation aerosol inhaler   Inhale 2 puffs every 4-6 hours by inhalation route as needed.      amLODIPine (NORVASC) 10 MG tablet       atorvastatin (LIPITOR) 20 MG tablet atorvastatin 20 mg tablet      azelastine (ASTELIN) 137 mcg (0.1 %) nasal spray azelastine 137 mcg (0.1 %) nasal spray aerosol   Spray 2 sprays twice a day by intranasal route.      dicyclomine (BENTYL) 20 mg tablet dicyclomine 20 mg tablet   Take 1 tablet 4 times a day by oral route for 30 days.      levothyroxine (SYNTHROID) 25 MCG tablet Take 25 mcg by mouth once daily.      lisinopril (PRINIVIL,ZESTRIL) 20 MG tablet       lisinopril-hydrochlorothiazide (PRINZIDE,ZESTORETIC) 20-12.5 mg per tablet lisinopril 20 mg-hydrochlorothiazide 12.5 mg tablet   Take 1 tablet(s) every day by oral route.      meclizine (ANTIVERT) 25 mg tablet Take 1 tablet (25 mg total) by mouth 3 (three) times daily as needed for Dizziness. 30 tablet 0    sucralfate (CARAFATE) 1 gram tablet TAKE 1 TABLET (1 G TOTAL) BY MOUTH 3  "(THREE) TIMES DAILY AS NEEDED (ABD SYMPTOMS). 270 tablet 1    VITAMIN D2 50,000 unit capsule       [DISCONTINUED] pantoprazole (PROTONIX) 40 MG tablet Take 1 tablet (40 mg total) by mouth once daily. 90 tablet 3    [DISCONTINUED] amitriptyline (ELAVIL) 10 MG tablet        No current facility-administered medications on file prior to visit.       Vital signs:  Ht 5' 2" (1.575 m)   Wt 84.6 kg (186 lb 8.2 oz)   BMI 34.11 kg/m²     Physical Exam  Vitals reviewed.   Constitutional:       General: She is not in acute distress.  HENT:      Head: Normocephalic and atraumatic.   Eyes:      General: No scleral icterus.     Conjunctiva/sclera: Conjunctivae normal.   Abdominal:      General: There is no distension.      Palpations: Abdomen is soft. There is no mass.      Tenderness: There is no abdominal tenderness.      Hernia: No hernia is present.      Comments: TTP lower quadrants without rigidity or guarding  On carnetts, there is still present pain mainly over RLQ as well   Skin:     General: Skin is warm.      Coloration: Skin is not pale.      Findings: No rash.   Neurological:      Mental Status: She is oriented to person, place, and time.      Gait: Gait normal.   Psychiatric:         Mood and Affect: Mood normal.         Behavior: Behavior normal.       I have reviewed prior labs, imaging, notes from last month    Assessment:  1. Abdominal pain, unspecified abdominal location    2. Screen for colon cancer    3. Bloating    4. Other irritable bowel syndrome    5. Gastroesophageal reflux disease, unspecified whether esophagitis present      2nd opinoin  Continued abd pain without obvious etiology despite exhaustive workup    Her exam is reassuring and benign. CT recently normal (outside)  I suspect this is IBS    Plan:  Orders Placed This Encounter    nortriptyline (PAMELOR) 25 MG capsule    polyethylene glycol (GOLYTELY) 236-22.74-6.74 -5.86 gram suspension    pantoprazole (PROTONIX) 40 MG tablet    Case Request " Endoscopy: COLONOSCOPY     Plan colonoscopy    Trial pamelor nightly    Rtc based on above.    Luis Carlos Wilkins MD  [facility]

## 2023-04-12 ENCOUNTER — TELEPHONE (OUTPATIENT)
Dept: ENDOSCOPY | Facility: HOSPITAL | Age: 58
End: 2023-04-12
Payer: COMMERCIAL

## 2023-04-12 NOTE — TELEPHONE ENCOUNTER
Left message instructing patient to call dept @ [phone] between 8am-4pm.    Arrival time to be given @ 0815  (Message sent via [facility] portal)

## 2023-04-12 NOTE — TELEPHONE ENCOUNTER
Spoke with patient about arrival time @ 0815.       Prep instructions reviewed: the day before the procedure, follow a clear liquid diet all day, then start the first 1/2 of prep at 5pm and take 2nd 1/2 of prep @ 0315.  Pt must be completely NPO when prep completed @ 0515.              Medications: Do not take Insulin or oral diabetic medications the day of the procedure.  Take as prescribed: heart, seizure and blood pressure medication in the morning with a sip of water (less than an ounce).  Take any breathing medications and bring inhalers to hospital with you Leave all valuables and jewelry at home.     Wear comfortable clothes to procedure to change into hospital gown You cannot drive for 24 hours after your procedure because you will receive sedation for your procedure to make you comfortable.  A ride must be provided at discharge.

## 2023-04-13 ENCOUNTER — ANESTHESIA EVENT (OUTPATIENT)
Dept: ENDOSCOPY | Facility: HOSPITAL | Age: 58
End: 2023-04-13
Payer: COMMERCIAL

## 2023-04-13 NOTE — ANESTHESIA PREPROCEDURE EVALUATION
04/13/2023  Amber Khan is a 58 y.o., female.      Pre-op Assessment    I have reviewed the Patient Summary Reports.     I have reviewed the Nursing Notes.    I have reviewed the Medications.     Review of Systems  Anesthesia Hx:  Denies Family Hx of Anesthesia complications.   Denies Personal Hx of Anesthesia complications.      There is no problem list on file for this patient.      Past Medical History:   Diagnosis Date    Asthma     Fibromyalgia 2009    Hyperlipidemia     Hypertension     Hypothyroidism        ECHO: No results found for this or any previous visit.      There is no height or weight on file to calculate BMI.    Tobacco Use: Low Risk     Smoking Tobacco Use: Never    Smokeless Tobacco Use: Never    Passive Exposure: Never       Social History     Substance and Sexual Activity   Drug Use No        Alcohol Use: Not on file       Review of patient's allergies indicates:  No Known Allergies      Airway:  No value filed.     Physical Exam    Airway:  Mouth Opening: Normal  TM Distance: Normal          Anesthesia Plan  Type of Anesthesia, risks & benefits discussed:    Anesthesia Type: Gen Natural Airway  Intra-op Monitoring Plan: Standard ASA Monitors  Post Op Pain Control Plan: multimodal analgesia  Induction:  IV  Informed Consent: Informed consent signed with the Patient and all parties understand the risks and agree with anesthesia plan.  All questions answered.   ASA Score: 3  Day of Surgery Review of History & Physical: H&P Update referred to the surgeon/provider.    Ready For Surgery From Anesthesia Perspective.     .

## 2023-04-14 ENCOUNTER — ANESTHESIA (OUTPATIENT)
Dept: ENDOSCOPY | Facility: HOSPITAL | Age: 58
End: 2023-04-14
Payer: COMMERCIAL

## 2023-04-14 ENCOUNTER — HOSPITAL ENCOUNTER (OUTPATIENT)
Facility: HOSPITAL | Age: 58
Discharge: HOME | End: 2023-04-14
Attending: INTERNAL MEDICINE | Admitting: INTERNAL MEDICINE
Payer: COMMERCIAL

## 2023-04-14 VITALS
SYSTOLIC BLOOD PRESSURE: 122 MMHG | WEIGHT: 170 LBS | RESPIRATION RATE: 18 BRPM | DIASTOLIC BLOOD PRESSURE: 86 MMHG | TEMPERATURE: 98 F | OXYGEN SATURATION: 97 % | BODY MASS INDEX: 29.02 KG/M2 | HEART RATE: 78 BPM | HEIGHT: 64 IN

## 2023-04-14 DIAGNOSIS — Z12.11 SCREEN FOR COLON CANCER: ICD-10-CM

## 2023-04-14 LAB
GLUCOSE SERPL-MCNC: 99 MG/DL (ref 70–110)
POCT GLUCOSE: 99 MG/DL (ref 70–110)

## 2023-04-14 PROCEDURE — D9220A PRA ANESTHESIA: ICD-10-PCS | Mod: CRNA,,, | Performed by: NURSE ANESTHETIST, CERTIFIED REGISTERED

## 2023-04-14 PROCEDURE — G0121 COLON CA SCRN NOT HI RSK IND: ICD-10-PCS | Mod: ,,, | Performed by: INTERNAL MEDICINE

## 2023-04-14 PROCEDURE — 63600175 PHARM REV CODE 636 W HCPCS: Performed by: NURSE ANESTHETIST, CERTIFIED REGISTERED

## 2023-04-14 PROCEDURE — G0121 COLON CA SCRN NOT HI RSK IND: HCPCS | Mod: ,,, | Performed by: INTERNAL MEDICINE

## 2023-04-14 PROCEDURE — 37000009 HC ANESTHESIA EA ADD 15 MINS: Performed by: INTERNAL MEDICINE

## 2023-04-14 PROCEDURE — 37000008 HC ANESTHESIA 1ST 15 MINUTES: Performed by: INTERNAL MEDICINE

## 2023-04-14 PROCEDURE — D9220A PRA ANESTHESIA: Mod: ANES,,, | Performed by: ANESTHESIOLOGY

## 2023-04-14 PROCEDURE — G0121 COLON CA SCRN NOT HI RSK IND: HCPCS | Performed by: INTERNAL MEDICINE

## 2023-04-14 PROCEDURE — 25000003 PHARM REV CODE 250: Performed by: NURSE ANESTHETIST, CERTIFIED REGISTERED

## 2023-04-14 PROCEDURE — 82962 GLUCOSE BLOOD TEST: CPT | Performed by: INTERNAL MEDICINE

## 2023-04-14 PROCEDURE — D9220A PRA ANESTHESIA: ICD-10-PCS | Mod: ANES,,, | Performed by: ANESTHESIOLOGY

## 2023-04-14 PROCEDURE — D9220A PRA ANESTHESIA: Mod: CRNA,,, | Performed by: NURSE ANESTHETIST, CERTIFIED REGISTERED

## 2023-04-14 RX ORDER — PROPOFOL 10 MG/ML
VIAL (ML) INTRAVENOUS
Status: DISCONTINUED | OUTPATIENT
Start: 2023-04-14 | End: 2023-04-14

## 2023-04-14 RX ORDER — LIDOCAINE HYDROCHLORIDE 20 MG/ML
INJECTION INTRAVENOUS
Status: DISCONTINUED | OUTPATIENT
Start: 2023-04-14 | End: 2023-04-14

## 2023-04-14 RX ORDER — SODIUM CHLORIDE 9 MG/ML
INJECTION, SOLUTION INTRAVENOUS CONTINUOUS
Status: DISCONTINUED | OUTPATIENT
Start: 2023-04-14 | End: 2023-04-14 | Stop reason: HOSPADM

## 2023-04-14 RX ORDER — PROPOFOL 10 MG/ML
VIAL (ML) INTRAVENOUS CONTINUOUS PRN
Status: DISCONTINUED | OUTPATIENT
Start: 2023-04-14 | End: 2023-04-14

## 2023-04-14 RX ORDER — SODIUM CHLORIDE 9 MG/ML
INJECTION, SOLUTION INTRAVENOUS CONTINUOUS PRN
Status: DISCONTINUED | OUTPATIENT
Start: 2023-04-14 | End: 2023-04-14

## 2023-04-14 RX ORDER — SODIUM CHLORIDE 0.9 % (FLUSH) 0.9 %
10 SYRINGE (ML) INJECTION
Status: DISCONTINUED | OUTPATIENT
Start: 2023-04-14 | End: 2023-04-14 | Stop reason: HOSPADM

## 2023-04-14 RX ORDER — METFORMIN HYDROCHLORIDE 500 MG/1
500 TABLET ORAL 2 TIMES DAILY WITH MEALS
COMMUNITY

## 2023-04-14 RX ADMIN — PROPOFOL 50 MG: 10 INJECTION, EMULSION INTRAVENOUS at 09:04

## 2023-04-14 RX ADMIN — SODIUM CHLORIDE: 9 INJECTION, SOLUTION INTRAVENOUS at 09:04

## 2023-04-14 RX ADMIN — LIDOCAINE HYDROCHLORIDE 50 MG: 20 INJECTION, SOLUTION INTRAVENOUS at 09:04

## 2023-04-14 RX ADMIN — PROPOFOL 150 MCG/KG/MIN: 10 INJECTION, EMULSION INTRAVENOUS at 09:04

## 2023-04-14 NOTE — [employer] PATIENT INSTRUCTIONS
Discharge Summary/Instructions after an Endoscopic Procedure  Patient Name: Amber Khan  Patient MRN: [id number]  Patient YOB: 1965  Friday, April 14, 2023  Luis Carlos Wilkins MD  Dear patient,  As a result of recent federal legislation (The Federal Cures Act), you may   receive lab or pathology results from your procedure in your [facility]   account before your physician is able to contact you. Your physician or   their representative will relay the results to you with their   recommendations at their soonest availability.  Thank you,  Your health is very important to us during the Covid Crisis. Following your   procedure today, you will receive a daily text for 2 weeks asking about   signs or symptoms of Covid 19.  Please respond to this text when you   receive it so we can follow up and keep you as safe as possible.   RESTRICTIONS:  During your procedure today, you received medications for sedation.  These   medications may affect your judgment, balance and coordination.  Therefore,   for 24 hours, you have the following restrictions:   - DO NOT drive a car, operate machinery, make legal/financial decisions,   sign important papers or drink alcohol.    ACTIVITY:  Today: no heavy lifting, straining or running due to procedural   sedation/anesthesia.  The following day: return to full activity including work.  DIET:  Eat and drink normally unless instructed otherwise.     TREATMENT FOR COMMON SIDE EFFECTS:  - Mild abdominal pain, nausea, belching, bloating or excessive gas:  rest,   eat lightly and use a heating pad.  - Sore Throat: treat with throat lozenges and/or gargle with warm salt   water.  - Because air was used during the procedure, expelling large amounts of air   from your rectum or belching is normal.  - If a bowel prep was taken, you may not have a bowel movement for 1-3 days.    This is normal.  SYMPTOMS TO WATCH FOR AND REPORT TO YOUR PHYSICIAN:  1. Abdominal pain or bloating, other than gas  cramps.  2. Chest pain.  3. Back pain.  4. Signs of infection such as: chills or fever occurring within 24 hours   after the procedure.  5. Rectal bleeding, which would show as bright red, maroon, or black stools.   (A tablespoon of blood from the rectum is not serious, especially if   hemorrhoids are present.)  6. Vomiting.  7. Weakness or dizziness.  GO DIRECTLY TO THE NEAREST EMERGENCY ROOM IF YOU HAVE ANY OF THE FOLLOWING:      Difficulty breathing              Chills and/or fever over 101 F   Persistent vomiting and/or vomiting blood   Severe abdominal pain   Severe chest pain   Black, tarry stools   Bleeding- more than one tablespoon   Any other symptom or condition that you feel may need urgent attention  Your doctor recommends these additional instructions:  If any biopsies were taken, your doctors clinic will contact you in 1 to 2   weeks with any results.  - Discharge patient to home.   - Patient has a contact number available for emergencies.  The signs and   symptoms of potential delayed complications were discussed with the   patient.  Return to normal activities tomorrow.  Written discharge   instructions were provided to the patient.   - Resume previous diet.   - Continue present medications.   - Repeat colonoscopy in 10 years for screening purposes.   - No causes of abdominal pain seen today. Would consider seeing GYN for   possible diagnostic ex-lap as she has had extensive GI workup without any   cause of severe continued lower abdominal pain that has not had any relief   without any medication trials.  For questions, problems or results please call your physician - Luis Carlos Wilkins MD.  EMERGENCY PHONE NUMBER: [phone],  LAB RESULTS: [phone]  IF A COMPLICATION OR EMERGENCY SITUATION ARISES AND YOU ARE UNABLE TO REACH   YOUR PHYSICIAN - GO DIRECTLY TO THE EMERGENCY ROOM.  Luis Carlos Wilkins MD  4/14/2023 10:05:25 AM  This report has been verified and signed electronically.  Dear  patient,  As a result of recent federal legislation (The Federal Cures Act), you may   receive lab or pathology results from your procedure in your [facility]   account before your physician is able to contact you. Your physician or   their representative will relay the results to you with their   recommendations at their soonest availability.  Thank you,  [employer]

## 2023-04-14 NOTE — ANESTHESIA POSTPROCEDURE EVALUATION
Anesthesia Post Evaluation    Patient: Amber Khan    Procedure(s) Performed: Procedure(s) (LRB):  COLONOSCOPY (N/A)    Final Anesthesia Type: general      Patient location during evaluation: PACU  Patient participation: Yes- Able to Participate  Level of consciousness: awake and alert  Post-procedure vital signs: reviewed and stable  Pain management: adequate  Airway patency: patent    PONV status at discharge: No PONV  Anesthetic complications: no      Cardiovascular status: stable  Respiratory status: spontaneous ventilation  Hydration status: euvolemic  Follow-up not needed.          Vitals Value Taken Time   /86 04/14/23 1020   Temp 36.7 °C (98.1 °F) 04/14/23 1005   Pulse 78 04/14/23 1020   Resp 18 04/14/23 1020   SpO2 97 % 04/14/23 1020         No case tracking events are documented in the log.      Pain/Isabel Score: Isabel Score: 10 (4/14/2023 10:05 AM)

## 2023-04-14 NOTE — TRANSFER OF CARE
"Anesthesia Transfer of Care Note    Patient: Amber Khan    Procedure(s) Performed: Procedure(s) (LRB):  COLONOSCOPY (N/A)    Patient location: GI    Anesthesia Type: general    Transport from OR: Transported from OR on room air with adequate spontaneous ventilation    Post pain: adequate analgesia    Post assessment: no apparent anesthetic complications and tolerated procedure well    Post vital signs: stable    Level of consciousness: awake and oriented    Nausea/Vomiting: no nausea/vomiting    Complications: none    Transfer of care protocol was followed      Last vitals:   Visit Vitals  BP (!) 148/89 (Patient Position: Lying)   Pulse 90   Temp 36.8 °C (98.2 °F)   Resp 18   Ht 5' 4" (1.626 m)   Wt 77.1 kg (170 lb)   SpO2 98%   Breastfeeding No   BMI 29.18 kg/m²     "

## 2023-05-18 ENCOUNTER — OFFICE VISIT (OUTPATIENT)
Dept: ALLERGY | Facility: CLINIC | Age: 58
End: 2023-05-18
Payer: COMMERCIAL

## 2023-05-18 ENCOUNTER — LAB VISIT (OUTPATIENT)
Dept: LAB | Facility: HOSPITAL | Age: 58
End: 2023-05-18
Attending: ALLERGY & IMMUNOLOGY
Payer: COMMERCIAL

## 2023-05-18 VITALS — WEIGHT: 182.13 LBS | HEIGHT: 64 IN | BODY MASS INDEX: 31.09 KG/M2

## 2023-05-18 DIAGNOSIS — G43.809 OTHER MIGRAINE WITHOUT STATUS MIGRAINOSUS, NOT INTRACTABLE: Primary | ICD-10-CM

## 2023-05-18 DIAGNOSIS — J45.20 ASTHMA IN ADULT, MILD INTERMITTENT, UNCOMPLICATED: ICD-10-CM

## 2023-05-18 DIAGNOSIS — M25.50 ARTHRALGIA, UNSPECIFIED JOINT: ICD-10-CM

## 2023-05-18 DIAGNOSIS — L30.9 DERMATITIS: ICD-10-CM

## 2023-05-18 DIAGNOSIS — J32.9 CHRONIC SINUSITIS, UNSPECIFIED LOCATION: ICD-10-CM

## 2023-05-18 LAB
IGA SERPL-MCNC: 111 MG/DL (ref 40–350)
IGG SERPL-MCNC: 1016 MG/DL (ref 650–1600)
IGM SERPL-MCNC: 40 MG/DL (ref 50–300)

## 2023-05-18 PROCEDURE — 99205 OFFICE O/P NEW HI 60 MIN: CPT | Mod: S$GLB,,, | Performed by: ALLERGY & IMMUNOLOGY

## 2023-05-18 PROCEDURE — 99205 PR OFFICE/OUTPT VISIT, NEW, LEVL V, 60-74 MIN: ICD-10-PCS | Mod: S$GLB,,, | Performed by: ALLERGY & IMMUNOLOGY

## 2023-05-18 PROCEDURE — 99999 PR PBB SHADOW E&M-EST. PATIENT-LVL V: ICD-10-PCS | Mod: PBBFAC,,, | Performed by: ALLERGY & IMMUNOLOGY

## 2023-05-18 PROCEDURE — 82784 ASSAY IGA/IGD/IGG/IGM EACH: CPT | Mod: 59 | Performed by: ALLERGY & IMMUNOLOGY

## 2023-05-18 PROCEDURE — 99999 PR PBB SHADOW E&M-EST. PATIENT-LVL V: CPT | Mod: PBBFAC,,, | Performed by: ALLERGY & IMMUNOLOGY

## 2023-05-18 PROCEDURE — 82784 ASSAY IGA/IGD/IGG/IGM EACH: CPT | Performed by: ALLERGY & IMMUNOLOGY

## 2023-05-18 PROCEDURE — 86317 IMMUNOASSAY INFECTIOUS AGENT: CPT | Performed by: ALLERGY & IMMUNOLOGY

## 2023-05-18 PROCEDURE — 36415 COLL VENOUS BLD VENIPUNCTURE: CPT | Performed by: ALLERGY & IMMUNOLOGY

## 2023-05-18 RX ORDER — FLUTICASONE PROPIONATE 50 MCG
SPRAY, SUSPENSION (ML) NASAL
COMMUNITY
End: 2023-11-25

## 2023-05-18 NOTE — PROGRESS NOTES
Amber Khan is referred by Dr. Alfred Mena at [facility] for a consult regarding recurrent conjunctivitis and asthma.  The referral has been scanned into Epic. She is here with her  [place].     She grew up in [place] and developed increased rhinitis and conjunctivitis when she was 16 or 17 years old.  She moved to the [place] in 1983 and has continued to have symptoms.     Her predominant symptom is a headache over her forehead and top of the head that is severe.  She also has bilateral eye pain and dizziness. She has associated with nausea and photophobia.  She calls these sinus headaches.  She does not take medication for migraine currently.    She also has pruritus of the nose, eyes, ears, and throat, eye redness and tearing, sneezing, clear rhinorrhea, sore throats, and intermittent shortness of breath. She does have a history of asthma and has taken albuterol once or twice a day as needed for over 10 years.    She has been taking Zyrtec, Claritin, Flonase, azelastine, and occasionally Afrin without any benefit.    She has had frequent ear infections and had PE tubes placed by Dr. Ramírez in ENT seven years ago.  She would a CT scan done that she thinks was normal except for a nasal septal deviation.  She had surgery on her nose or sinuses then.    She saw Dr. Mcneill an allergist in the same office about a year later.  She thinks she had positive test to trees and grass.  He diagnosed her with asthma and gave her peak flow meter.  He did not recommend immunotherapy.    For ROS, FH, SH please see Allergy and Asthma Questionnaire dated today.    Some relevant pertinent positives:    Review of Systems/PMH:  She has diffuse pain in most joints.  She used to see Dr. Winter in Rheumatology who diagnosed her with arthritis. She also was told she has fibromyalgia.  She has hypertension and takes amlodipine and lisinopril-hydrochlorothiazide. She has diabetes and takes metformin. She has  hypothyroidism and takes Synthroid. She has hyperlipidemia and takes Lipitor.  She has reflux and takes Protonix 40 milligrams a day as well as sucralfate.  This controls her symptoms.    Family History:  Her daughter has asthma.    Home environment:  She has lived in the same house for the past 10 years. There was no water damage.  There is carpeting in the bedroom. There is one cat that goes in and outside of the house. She does not have any problems around the cat.    Social History:  She has worked at [employer] for the past 18 years.  She is off on Thursdays.  She works in sales.  She is a nonsmoker.    Physical Examination:  General: Well-developed, well-nourished, no acute distress.  Head: No sinus tenderness.  Eyes: Conjunctivae:  No bulbar or palpebral conjunctival injection.  Ears: EAC's clear.  TM's scarred bilaterally.  No pre-auricular nodes.  Nose: Nasal Mucosa:  Pink.  Septum: No apparent deviation.  Turbinates:  No significant edema.  Polyps/Mass:  None visible.  Teeth/Gums:  No bleeding noted.  Oropharynx: No exudates.  Neck: Supple without thyromegaly. No cervical lymphadenopathy.    Respiratory/Chest: Effort: Good.  Auscultation:  Clear bilaterally.  Cardiovascular:  No murmur, rubs, or gallop heard.   GI:  Non-tender.  No masses.  No organomegaly.  Extremities:  No cyanosis, clubbing, or edema.  Skin: Good turgor.  No urticaria or angioedema.  She has a small cyst lesion on the top of her scalp.  She also has some hyperpigmentation of the upper arms.  Neuro/Psych: Oriented x 3.    Assessment:  1. Chronic rhinitis, consider allergic.   2. Chronic conjunctivitis, consider allergic.   3. Asthma, consider allergic component.   4. Chronic headaches, probably migraine.   5. GERD, controlled.   6. Hypertension on amlodipine and lisinopril-hydrochlorothiazide.   7. History of arthritis, probably osteoarthritis   8. Fibromyalgia.   9. Diabetes on metformin.  10. Hypothyroidism on replacement.   11.  S/P PE tubes.   12. S/P sinus surgery.   13. Hyperlipidemia on Lipitor.     Recommendations:  1. Laboratory as ordered.   2. Sinus CT.   3. Complete PFT.   4. Continue Flonase daily.   5. Continue Claritin or Zyrtec daily.   6. Continue azelastine as needed.   7. Neurology evaluation for migraine headaches.   8. Dermatology evaluation of rash and scalp lesion.  9. Rheumatology evaluation.   10. Return to clinic in two weeks.    Patient education May 18, 2023:   Relationship between sinus headaches and migraine was reviewed.    Time attributed to the following activities: preparing to see the patient, obtaining and/or reviewing separately obtained history, performing a medically appropriate examination and/or evaluation, counseling and education the patient/family/caregiver, documenting clinical information in the electronic or other health record, independently interpreting results (not separately reported) and communicating results to the patient/family/caregiver, care coordination (not separately reported), ordering medications, tests, or procedures, and referring and communications with other health care professionals (not separately reported).  75 minutes.

## 2023-05-25 ENCOUNTER — OFFICE VISIT (OUTPATIENT)
Dept: DERMATOLOGY | Facility: CLINIC | Age: 58
End: 2023-05-25
Payer: COMMERCIAL

## 2023-05-25 DIAGNOSIS — Z76.89 ENCOUNTER FOR SKIN CARE: Primary | ICD-10-CM

## 2023-05-25 DIAGNOSIS — L30.9 DERMATITIS: ICD-10-CM

## 2023-05-25 DIAGNOSIS — L72.0 EPIDERMAL CYST: ICD-10-CM

## 2023-05-25 DIAGNOSIS — L85.8 KP (KERATOSIS PILARIS): ICD-10-CM

## 2023-05-25 DIAGNOSIS — D23.9 SYRINGOMA: ICD-10-CM

## 2023-05-25 PROCEDURE — 99203 PR OFFICE/OUTPT VISIT, NEW, LEVL III, 30-44 MIN: ICD-10-PCS | Mod: S$GLB,,, | Performed by: DERMATOLOGY

## 2023-05-25 PROCEDURE — 99203 OFFICE O/P NEW LOW 30 MIN: CPT | Mod: S$GLB,,, | Performed by: DERMATOLOGY

## 2023-05-25 PROCEDURE — 99999 PR PBB SHADOW E&M-EST. PATIENT-LVL III: ICD-10-PCS | Mod: PBBFAC,,, | Performed by: DERMATOLOGY

## 2023-05-25 PROCEDURE — 99999 PR PBB SHADOW E&M-EST. PATIENT-LVL III: CPT | Mod: PBBFAC,,, | Performed by: DERMATOLOGY

## 2023-05-25 NOTE — PROGRESS NOTES
Subjective:      Patient ID:  Amber Khan is a 58 y.o. female who presents for   Chief Complaint   Patient presents with    Cyst     Scalp    Rash     Upper body    Spot     Both eyes     HPI    Review of Systems   Constitutional:  Negative for fever.   HENT:  Negative for sore throat.    Respiratory:  Negative for cough.      Objective:   Physical Exam     Diagram Legend     Erythematous scaling macule/papule c/w actinic keratosis       Vascular papule c/w angioma      Pigmented verrucoid papule/plaque c/w seborrheic keratosis      Yellow umbilicated papule c/w sebaceous hyperplasia      Irregularly shaped tan macule c/w lentigo     1-2 mm smooth white papules consistent with Milia      Movable subcutaneous cyst with punctum c/w epidermal inclusion cyst      Subcutaneous movable cyst c/w pilar cyst      Firm pink to brown papule c/w dermatofibroma      Pedunculated fleshy papule(s) c/w skin tag(s)      Evenly pigmented macule c/w junctional nevus     Mildly variegated pigmented, slightly irregular-bordered macule c/w mildly atypical nevus      Flesh colored to evenly pigmented papule c/w intradermal nevus       Pink pearly papule/plaque c/w basal cell carcinoma      Erythematous hyperkeratotic cursted plaque c/w SCC      Surgical scar with no sign of skin cancer recurrence      Open and closed comedones      Inflammatory papules and pustules      Verrucoid papule consistent consistent with wart     Erythematous eczematous patches and plaques     Dystrophic onycholytic nail with subungual debris c/w onychomycosis     Umbilicated papule    Erythematous-base heme-crusted tan verrucoid plaque consistent with inflamed seborrheic keratosis     Erythematous Silvery Scaling Plaque c/w Psoriasis     See annotation  Around eyes with multiple papules.  Arms with exten kp.  Top of head with cyst.    Assessment / Plan:        Encounter for skin care  Good skin care regimen discussed including limiting to one bath or shower  per day, using lukewarm water with mild soap and moisturization to skin once to twice daily.  Consider glycerin bar soap or Dove.  Consider organic coconut oil.    Dermatitis  -     Ambulatory referral/consult to Dermatology  Previous [facility] labs and or records and notes reviewed and considered for their impact on our clinical decision making today.  None noted today.    KP (keratosis pilaris)  Etiology of keratosis pilaris discussed and explained the dry skin plugging of the pores.  Recommended salicylic acid washes for now with avoidance of excessively hot water bathing on affected areas.  Use a scrub brush and a loofa.  Can consider moisturizers and natural oils later.  Consider gluten free diet as it may help.    Epidermal cyst  Discussed with patient the likelihood that this lesion is an epidermal cyst caused by an involuted lining of skin with dead skin trapped inside.  Cysts do not have a malignant potential but can become infected and turn into abscesses with possible cellulitis.  Discussed the option of warm compresses if infected with oral antibiotics.  Discussed the option of surgical excision with scar, possible recurrence, hematoma, and infection.  Patient to consider these options.  Decision made for minor surgery today after discussing with the patient.  Patient agrees.  Discussed with patient the risks of procedure or surgery, including scar, ulceration, recurrence, skin discoloration, and infection.  Patient not to have alcohol, ibuprofen, nsaids day of and night before procedure.  No swimming pool or ocean water for one week after procedure.  No heavy exertion in general or physical activity that would stretch the surgical site for one week after the procedure.    Syringomas  Around eyes.  Discussed with patient the etiology and pathogenesis of the disease or skin lesion(s) and possible treatments and aggravators.    Chronic nature of this condition discussed with patient.  Discussed with patient  the benign nature of these lesions and that no treatment is indicated.  Prn cosmetic hyfrecation of these lesions on the around the eyes.  Costs $500.  Scar and recurrence reviewed.  Patient to premedicate spots and growths with over the counter lidocaine.             Follow up if symptoms worsen or fail to improve, for Procedure.

## 2023-05-29 LAB
DEPRECATED S PNEUM12 IGG SER-MCNC: <0.3 UG/ML
DEPRECATED S PNEUM23 IGG SER-MCNC: 16.5 UG/ML
DEPRECATED S PNEUM4 IGG SER-MCNC: 3.3 UG/ML
DEPRECATED S PNEUM8 IGG SER-MCNC: 4.7 UG/ML
DEPRECATED S PNEUM9 IGG SER-MCNC: 0.6 UG/ML
S PN DA SERO 19F IGG SER-MCNC: 9.5 UG/ML
S PNEUM DA 1 IGG SER-MCNC: 12.8 UG/ML
S PNEUM DA 14 IGG SER-MCNC: 57.6
S PNEUM DA 18C IGG SER-MCNC: 3.3
S PNEUM DA 3 IGG SER-MCNC: 4.9 UG/ML
S PNEUM DA 5 IGG SER-MCNC: 4.3 UG/ML
S PNEUM DA 6B IGG SER-MCNC: 12 UG/ML
S PNEUM DA 7F IGG SER-MCNC: 9 UG/ML
S PNEUM DA 9V IGG SER-MCNC: 4.1 UG/ML

## 2023-06-05 ENCOUNTER — TELEPHONE (OUTPATIENT)
Dept: ALLERGY | Facility: CLINIC | Age: 58
End: 2023-06-05
Payer: COMMERCIAL

## 2023-06-08 ENCOUNTER — OFFICE VISIT (OUTPATIENT)
Dept: ALLERGY | Facility: CLINIC | Age: 58
End: 2023-06-08
Payer: COMMERCIAL

## 2023-06-08 ENCOUNTER — LAB VISIT (OUTPATIENT)
Dept: LAB | Facility: HOSPITAL | Age: 58
End: 2023-06-08
Attending: ALLERGY & IMMUNOLOGY
Payer: COMMERCIAL

## 2023-06-08 ENCOUNTER — HOSPITAL ENCOUNTER (OUTPATIENT)
Dept: PULMONOLOGY | Facility: CLINIC | Age: 58
Discharge: HOME | End: 2023-06-08
Payer: COMMERCIAL

## 2023-06-08 VITALS
BODY MASS INDEX: 31.79 KG/M2 | SYSTOLIC BLOOD PRESSURE: 124 MMHG | DIASTOLIC BLOOD PRESSURE: 84 MMHG | HEART RATE: 99 BPM | WEIGHT: 185.19 LBS

## 2023-06-08 DIAGNOSIS — I10 HYPERTENSION, UNSPECIFIED TYPE: ICD-10-CM

## 2023-06-08 DIAGNOSIS — H10.403 CHRONIC CONJUNCTIVITIS OF BOTH EYES, UNSPECIFIED CHRONIC CONJUNCTIVITIS TYPE: ICD-10-CM

## 2023-06-08 DIAGNOSIS — R51.9 NONINTRACTABLE HEADACHE, UNSPECIFIED CHRONICITY PATTERN, UNSPECIFIED HEADACHE TYPE: ICD-10-CM

## 2023-06-08 DIAGNOSIS — E78.5 HYPERLIPIDEMIA, UNSPECIFIED HYPERLIPIDEMIA TYPE: ICD-10-CM

## 2023-06-08 DIAGNOSIS — J45.20 ASTHMA IN ADULT, MILD INTERMITTENT, UNCOMPLICATED: ICD-10-CM

## 2023-06-08 DIAGNOSIS — K21.9 GASTROESOPHAGEAL REFLUX DISEASE, UNSPECIFIED WHETHER ESOPHAGITIS PRESENT: ICD-10-CM

## 2023-06-08 DIAGNOSIS — J31.0 CHRONIC RHINITIS: Primary | ICD-10-CM

## 2023-06-08 DIAGNOSIS — M79.7 FIBROMYALGIA: ICD-10-CM

## 2023-06-08 DIAGNOSIS — E11.9 TYPE 2 DIABETES MELLITUS WITHOUT COMPLICATION, WITHOUT LONG-TERM CURRENT USE OF INSULIN: ICD-10-CM

## 2023-06-08 DIAGNOSIS — J31.0 CHRONIC RHINITIS: ICD-10-CM

## 2023-06-08 LAB — IGE SERPL-ACNC: <35 IU/ML (ref 0–100)

## 2023-06-08 PROCEDURE — 82785 ASSAY OF IGE: CPT | Performed by: ALLERGY & IMMUNOLOGY

## 2023-06-08 PROCEDURE — 94729 PR C02/MEMBANE DIFFUSE CAPACITY: ICD-10-PCS | Mod: S$GLB,,, | Performed by: INTERNAL MEDICINE

## 2023-06-08 PROCEDURE — 94727 GAS DIL/WSHOT DETER LNG VOL: CPT | Mod: S$GLB,,, | Performed by: INTERNAL MEDICINE

## 2023-06-08 PROCEDURE — 94729 DIFFUSING CAPACITY: CPT | Mod: S$GLB,,, | Performed by: INTERNAL MEDICINE

## 2023-06-08 PROCEDURE — 86003 ALLG SPEC IGE CRUDE XTRC EA: CPT | Mod: 59 | Performed by: ALLERGY & IMMUNOLOGY

## 2023-06-08 PROCEDURE — 99214 OFFICE O/P EST MOD 30 MIN: CPT | Mod: S$GLB,,, | Performed by: ALLERGY & IMMUNOLOGY

## 2023-06-08 PROCEDURE — 94060 PR EVAL OF BRONCHOSPASM: ICD-10-PCS | Mod: S$GLB,,, | Performed by: INTERNAL MEDICINE

## 2023-06-08 PROCEDURE — 86003 ALLG SPEC IGE CRUDE XTRC EA: CPT | Performed by: ALLERGY & IMMUNOLOGY

## 2023-06-08 PROCEDURE — 99999 PR PBB SHADOW E&M-EST. PATIENT-LVL III: CPT | Mod: PBBFAC,,, | Performed by: ALLERGY & IMMUNOLOGY

## 2023-06-08 PROCEDURE — 99214 PR OFFICE/OUTPT VISIT, EST, LEVL IV, 30-39 MIN: ICD-10-PCS | Mod: S$GLB,,, | Performed by: ALLERGY & IMMUNOLOGY

## 2023-06-08 PROCEDURE — 99999 PR PBB SHADOW E&M-EST. PATIENT-LVL III: ICD-10-PCS | Mod: PBBFAC,,, | Performed by: ALLERGY & IMMUNOLOGY

## 2023-06-08 PROCEDURE — 94727 PR PULM FUNCTION TEST BY GAS: ICD-10-PCS | Mod: S$GLB,,, | Performed by: INTERNAL MEDICINE

## 2023-06-08 PROCEDURE — 94060 EVALUATION OF WHEEZING: CPT | Mod: S$GLB,,, | Performed by: INTERNAL MEDICINE

## 2023-06-08 NOTE — PROGRESS NOTES
Amber Khan returns to clinic today for continued evaluation of recurrent conjunctivitis and asthma.  She is here with her  [place].  She was last seen May 18, 2023.     After her last visit, she saw NP Jacinta Sands at [facility].    She did thinks she had migraine headaches and prescribed butalbital-acetaminophen-caffeine.  She has taken this once a day for the last several days.  She does think that it helps.     She continues to have pain over her forehead and behind her eyes.  She calls this a sinus headache.  She does have associated nausea and photophobia.    She does see Neurology in two weeks.     She continues to have pain in her right ear.  She was told this was due to her previous PE tube.     She continues to have rhinitis with pruritus of the nose, eyes, ears, and throat, eye redness and tearing, sneezing, clear rhinorrhea, sore throat, and intermittent shortness of breath.  She has been told in the past that she had asthma.     She takes Claritin or Zyrtec as needed.  She also takes Flonase and azelastine as needed.  She only takes several days a week.  She does not see much improvement.    She saw Dr. Mcneill an allergist.  She thinks she had positive test to trees and grass.  He diagnosed her with asthma and gave her peak flow meter.  He did not recommend immunotherapy.    She saw Dr. Rodriguez in Dermatology and is having the cyst in her scalp removed in the future.    Her Immunocaps were not done.  She has not yet had a sinus CT or PFTs.    Physical Examination:  General: Well-developed, well-nourished, no acute distress.  Head: No sinus tenderness.  Eyes: Conjunctivae:  No bulbar or palpebral conjunctival injection.  Ears: EAC's clear.  TM's scarred bilaterally.  No pre-auricular nodes.  Nose: Nasal Mucosa:  Pink.  Septum: No apparent deviation.  Turbinates:  No significant edema.  Polyps/Mass:  None visible.  Teeth/Gums:  No bleeding noted.  Oropharynx:  No exudates.  Neck: Supple without thyromegaly. No cervical lymphadenopathy.    Respiratory/Chest: Effort: Good.  Auscultation:  Clear bilaterally.  Skin: Good turgor.  No urticaria or angioedema.    Neuro/Psych: Oriented x 3.    Laboratory 05/18/2023:   IgA:  111.   IgG:  10 16.   IgM:  40.   Pneumococcal titers:  Protective.    Assessment:  1. Chronic rhinitis, consider allergic.   2. Chronic conjunctivitis, consider allergic.   3. Asthma, consider allergic component.   4. Chronic headaches, probably migraine.   5. GERD, controlled.   6. Hypertension on amlodipine and lisinopril-hydrochlorothiazide.   7. History of arthritis, probably osteoarthritis   8. Fibromyalgia.   9. Diabetes on metformin.  10. Hypothyroidism on replacement.   11. S/P PE tubes.   12. S/P sinus surgery.   13. Hyperlipidemia on Lipitor.     Recommendations:  1. Laboratory as ordered.   2. Sinus CT.   3. Complete PFT.   4. Take Flonase daily.   5. Continue Claritin or Zyrtec daily.   6. Continue azelastine as needed.   7. Neurology evaluation for migraine headaches as scheduled.  8. Dermatology follow-up.  9. Rheumatology evaluation.   10. Return to clinic in two weeks.  11. Consider skin testing off antihistamines if needed.    Patient education May 18, 2023:   Relationship between sinus headaches and migraine was reviewed.    Time attributed to the following activities: preparing to see the patient, obtaining and/or reviewing separately obtained history, performing a medically appropriate examination and/or evaluation, counseling and education the patient/family/caregiver, documenting clinical information in the electronic or other health record, independently interpreting results (not separately reported) and communicating results to the patient/family/caregiver, care coordination (not separately reported), and ordering medications, tests, or procedures.  35 minutes.

## 2023-06-09 ENCOUNTER — HOSPITAL ENCOUNTER (OUTPATIENT)
Dept: RADIOLOGY | Facility: HOSPITAL | Age: 58
Discharge: HOME | End: 2023-06-09
Attending: ALLERGY & IMMUNOLOGY
Payer: COMMERCIAL

## 2023-06-09 DIAGNOSIS — J32.9 CHRONIC SINUSITIS, UNSPECIFIED LOCATION: ICD-10-CM

## 2023-06-09 PROBLEM — E11.9 TYPE 2 DIABETES MELLITUS WITHOUT COMPLICATION, WITHOUT LONG-TERM CURRENT USE OF INSULIN: Status: ACTIVE | Noted: 2023-06-09

## 2023-06-09 PROBLEM — E78.5 HYPERLIPIDEMIA: Status: ACTIVE | Noted: 2023-06-09

## 2023-06-09 PROBLEM — I10 HYPERTENSION: Status: ACTIVE | Noted: 2023-06-09

## 2023-06-09 PROBLEM — M79.7 FIBROMYALGIA: Status: ACTIVE | Noted: 2023-06-09

## 2023-06-09 PROCEDURE — 70486 CT MAXILLOFACIAL W/O DYE: CPT | Mod: 26,,, | Performed by: RADIOLOGY

## 2023-06-09 PROCEDURE — 70486 CT MAXILLOFACIAL W/O DYE: CPT | Mod: TC

## 2023-06-09 PROCEDURE — 70486 CT SINUSES WITHOUT CONTRAST: ICD-10-PCS | Mod: 26,,, | Performed by: RADIOLOGY

## 2023-06-13 LAB
A ALTERNATA IGE QN: <0.1 KU/L
A FUMIGATUS IGE QN: <0.1 KU/L
BERMUDA GRASS IGE QN: <0.1 KU/L
CAT DANDER IGE QN: <0.1 KU/L
CEDAR IGE QN: <0.1 KU/L
D FARINAE IGE QN: 0.28 KU/L
D PTERONYSS IGE QN: <0.1 KU/L
DEPRECATED A ALTERNATA IGE RAST QL: NORMAL
DEPRECATED A FUMIGATUS IGE RAST QL: NORMAL
DEPRECATED BERMUDA GRASS IGE RAST QL: NORMAL
DEPRECATED CAT DANDER IGE RAST QL: NORMAL
DEPRECATED CEDAR IGE RAST QL: NORMAL
DEPRECATED D FARINAE IGE RAST QL: ABNORMAL
DEPRECATED D PTERONYSS IGE RAST QL: NORMAL
DEPRECATED DOG DANDER IGE RAST QL: NORMAL
DEPRECATED ELDER IGE RAST QL: NORMAL
DEPRECATED ENGL PLANTAIN IGE RAST QL: NORMAL
DEPRECATED PECAN/HICK TREE IGE RAST QL: NORMAL
DEPRECATED ROACH IGE RAST QL: NORMAL
DEPRECATED TIMOTHY IGE RAST QL: NORMAL
DEPRECATED WEST RAGWEED IGE RAST QL: NORMAL
DEPRECATED WHITE OAK IGE RAST QL: NORMAL
DOG DANDER IGE QN: <0.1 KU/L
ELDER IGE QN: <0.1 KU/L
ENGL PLANTAIN IGE QN: <0.1 KU/L
PECAN/HICK TREE IGE QN: <0.1 KU/L
ROACH IGE QN: <0.1 KU/L
TIMOTHY IGE QN: <0.1 KU/L
WEST RAGWEED IGE QN: <0.1 KU/L
WHITE OAK IGE QN: <0.1 KU/L

## 2023-06-15 ENCOUNTER — TELEPHONE (OUTPATIENT)
Dept: DERMATOLOGY | Facility: CLINIC | Age: 58
End: 2023-06-15
Payer: COMMERCIAL

## 2023-06-15 ENCOUNTER — PROCEDURE VISIT (OUTPATIENT)
Dept: DERMATOLOGY | Facility: CLINIC | Age: 58
End: 2023-06-15
Payer: COMMERCIAL

## 2023-06-15 DIAGNOSIS — L72.0 EPIDERMAL CYST: Primary | ICD-10-CM

## 2023-06-15 DIAGNOSIS — D23.9 SYRINGOMA: Primary | ICD-10-CM

## 2023-06-15 PROCEDURE — 17111 DESTRUCTION B9 LESIONS 15/>: CPT | Mod: CSM,S$GLB,, | Performed by: DERMATOLOGY

## 2023-06-15 PROCEDURE — 88304 TISSUE EXAM BY PATHOLOGIST: CPT | Mod: 26,,, | Performed by: PATHOLOGY

## 2023-06-15 PROCEDURE — 88304 TISSUE EXAM BY PATHOLOGIST: CPT | Performed by: PATHOLOGY

## 2023-06-15 PROCEDURE — 11422 PR EXC SKIN BENIG 1.1-2 CM REMAINDR BODY: ICD-10-PCS | Mod: S$GLB,,, | Performed by: DERMATOLOGY

## 2023-06-15 PROCEDURE — 12031 INTMD RPR S/A/T/EXT 2.5 CM/<: CPT | Mod: 51,S$GLB,, | Performed by: DERMATOLOGY

## 2023-06-15 PROCEDURE — 11422 EXC H-F-NK-SP B9+MARG 1.1-2: CPT | Mod: S$GLB,,, | Performed by: DERMATOLOGY

## 2023-06-15 PROCEDURE — 88304 PR  SURG PATH,LEVEL III: ICD-10-PCS | Mod: 26,,, | Performed by: PATHOLOGY

## 2023-06-15 PROCEDURE — 17111 PR DESTRUCTION BENIGN LESIONS 15 OR MORE: ICD-10-PCS | Mod: CSM,S$GLB,, | Performed by: DERMATOLOGY

## 2023-06-15 PROCEDURE — 12031 PR LAYR CLOS WND TRUNK,ARM,LEG <2.5 CM: ICD-10-PCS | Mod: 51,S$GLB,, | Performed by: DERMATOLOGY

## 2023-06-15 NOTE — TELEPHONE ENCOUNTER
----- Message from Rossy Martínez sent at 6/15/2023  9:08 AM CDT -----  Regarding: Appt  Contact: [phone] pt  Pt schedule for procedure with provider 6/15 and received an phone call stating if she can come in for 12 . Pt states she's currently at work and is coming straight from work to appt and since its was offered to her last minute she's unable to make arrangements . Please call

## 2023-06-15 NOTE — PROGRESS NOTES
Cosmetic treatment today performed for destruction of multiple syringomas of the eyelids.  Risks of scar and recurrence reviewed.  $500.

## 2023-06-15 NOTE — PATIENT INSTRUCTIONS
Post- Operative Wound Care    Your doctor has performed local skin surgery today.  Vaseline ointment and a pressure bandage were placed after the surgery.  It is very important that you keep this bandage in place for 24 hours.  This will decrease the risk of post - operative infection and bleeding.  After 24 hours, you may remove the band aid and wash the area with warm soap and water and apply Vaseline ointment.  Many patients prefer to use Neosporin or Bacitracin ointment.  This is acceptable; however know that you can develop an allergy to this medication even if you have used it safely for years.  It is important to keep the area moist.  Letting it dry out and get air slows healing time, will worsen the scar, and make it more difficult to remove the stitches.  Band aid is optional after first 24 hours.    It is best NOT to use hydrogen peroxide or antibacterial soap to wash the operative site.       If you notice increasing redness, tenderness, pain, or yellow drainage at the biopsy or surgical site, please notify your doctor.  These are signs of an infection.    If your biopsy/surgical site is bleeding, apply firm pressure for 15 minutes straight.  Repeat for another 15 minutes, if it is still bleeding.   If the surgical site continues to bleed, then please contact your doctor.      For [facility] users:   You will receive your pathology results in [facility] as soon as they are available. Please be assured that your physician/provider will review your results and contact you should additional treatment be required. This is one more way [employer] is putting you first.       [facility] [facility] [place] [address]/ [phone] FAX/ [email or web address]

## 2023-06-15 NOTE — TELEPHONE ENCOUNTER
----- Message from Ingrid Moncada sent at 6/15/2023  9:45 AM CDT -----  Regarding: pt advice  Contact: [phone]  Pt returning miss call from nurse Porter. Pls call

## 2023-06-21 LAB
FINAL PATHOLOGIC DIAGNOSIS: NORMAL
GROSS: NORMAL
Lab: NORMAL
MICROSCOPIC EXAM: NORMAL

## 2023-06-22 ENCOUNTER — OFFICE VISIT (OUTPATIENT)
Dept: ALLERGY | Facility: CLINIC | Age: 58
End: 2023-06-22
Payer: COMMERCIAL

## 2023-06-22 VITALS
SYSTOLIC BLOOD PRESSURE: 121 MMHG | OXYGEN SATURATION: 95 % | BODY MASS INDEX: 32.05 KG/M2 | HEART RATE: 94 BPM | WEIGHT: 186.75 LBS | DIASTOLIC BLOOD PRESSURE: 68 MMHG

## 2023-06-22 DIAGNOSIS — E78.5 HYPERLIPIDEMIA, UNSPECIFIED HYPERLIPIDEMIA TYPE: ICD-10-CM

## 2023-06-22 DIAGNOSIS — M79.7 FIBROMYALGIA: ICD-10-CM

## 2023-06-22 DIAGNOSIS — E11.9 TYPE 2 DIABETES MELLITUS WITHOUT COMPLICATION, WITHOUT LONG-TERM CURRENT USE OF INSULIN: ICD-10-CM

## 2023-06-22 DIAGNOSIS — H10.403 CHRONIC CONJUNCTIVITIS OF BOTH EYES, UNSPECIFIED CHRONIC CONJUNCTIVITIS TYPE: ICD-10-CM

## 2023-06-22 DIAGNOSIS — G89.29 CHRONIC NONINTRACTABLE HEADACHE, UNSPECIFIED HEADACHE TYPE: ICD-10-CM

## 2023-06-22 DIAGNOSIS — J31.0 CHRONIC RHINITIS: Primary | ICD-10-CM

## 2023-06-22 DIAGNOSIS — J45.20 ASTHMA IN ADULT, MILD INTERMITTENT, UNCOMPLICATED: ICD-10-CM

## 2023-06-22 DIAGNOSIS — R51.9 CHRONIC NONINTRACTABLE HEADACHE, UNSPECIFIED HEADACHE TYPE: ICD-10-CM

## 2023-06-22 DIAGNOSIS — I10 HYPERTENSION, UNSPECIFIED TYPE: ICD-10-CM

## 2023-06-22 PROCEDURE — 99213 OFFICE O/P EST LOW 20 MIN: CPT | Mod: 25,S$GLB,, | Performed by: ALLERGY & IMMUNOLOGY

## 2023-06-22 PROCEDURE — 99999 PR PBB SHADOW E&M-EST. PATIENT-LVL IV: ICD-10-PCS | Mod: PBBFAC,,, | Performed by: ALLERGY & IMMUNOLOGY

## 2023-06-22 PROCEDURE — 95004 PERQ TESTS W/ALRGNC XTRCS: CPT | Mod: S$GLB,,, | Performed by: ALLERGY & IMMUNOLOGY

## 2023-06-22 PROCEDURE — 99213 PR OFFICE/OUTPT VISIT, EST, LEVL III, 20-29 MIN: ICD-10-PCS | Mod: 25,S$GLB,, | Performed by: ALLERGY & IMMUNOLOGY

## 2023-06-22 PROCEDURE — 95004 PR ALLERGY SKIN TESTS,ALLERGENS: ICD-10-PCS | Mod: S$GLB,,, | Performed by: ALLERGY & IMMUNOLOGY

## 2023-06-22 PROCEDURE — 99999 PR PBB SHADOW E&M-EST. PATIENT-LVL IV: CPT | Mod: PBBFAC,,, | Performed by: ALLERGY & IMMUNOLOGY

## 2023-06-22 RX ORDER — BUTALBITAL, ACETAMINOPHEN AND CAFFEINE 300; 40; 50 MG/1; MG/1; MG/1
CAPSULE ORAL
COMMUNITY
End: 2023-06-23

## 2023-06-22 NOTE — PROGRESS NOTES
Neurology Clinic Note      Date: 6/23/23  Patient Name: Amber Khan   MRN: [id number]   PCP: Vishal Womack  Referring Provider: CONSTANTINE García III, MD    Assessment and Plan:   Amber Khan is a 58 y.o. female presenting for evaluation of chronic headaches with mixed features of migraine and tension-type headaches.  She is on nortriptyline 25 mg daily which has not helped.  Discussed a trial of Depakote 250 mg ER daily  RTC 2 months.    Problem List Items Addressed This Visit          Neuro    Chronic tension-type headache, not intractable - Primary    Migraine without status migrainosus, not intractable    Relevant Medications    divalproex (DEPAKOTE ER) 250 MG 24 hr tablet         Subjective:          HPI:   Ms. Amber Khan is a 58 y.o. female with a history of HTN, HLD, T2DM, fibromyalgia, asthma presenting for evaluation of headaches.    Her headaches have been going on for many years.  Was having them infrequently (once in a few months).  However, over the last 2-3 years, the frequency of the attacks have increased.  Currently, she has near daily headaches and they tend to fluctuate in severity.  Describes them as holocephalic headaches with stiffness in the upper back, cervical paraspinal region and shoulders.  Nausea is rare. Photophobia and phonophobia occur occasionally.  NSAIDs and Tylenol have not been effective.  She has also tried Flexeril and Robaxin which have not helped.  She was prescribed nortriptyline a month ago for stomach cramps and IBS - this has not helped with her headache either.    She works at [employer]    PAST MEDICAL HISTORY:  Past Medical History:   Diagnosis Date    Allergy     Asthma     Fibromyalgia 2009    Hyperlipidemia     Hypertension     Hypothyroidism        PAST SURGICAL HISTORY:  Past Surgical History:   Procedure Laterality Date    COLONOSCOPY N/A 04/14/2023    Procedure: COLONOSCOPY;  Surgeon: Luis Carlos Wilkins MD;  Location: [facility];  Service: Endoscopy;   Laterality: N/A;    HYSTERECTOMY      TUBAL LIGATION      TYMPANOSTOMY TUBE PLACEMENT         CURRENT MEDS:  Current Outpatient Medications   Medication Sig Dispense Refill    albuterol (PROVENTIL/VENTOLIN HFA) 90 mcg/actuation inhaler ProAir HFA 90 mcg/actuation aerosol inhaler   Inhale 2 puffs every 4-6 hours by inhalation route as needed.      amLODIPine (NORVASC) 10 MG tablet       atorvastatin (LIPITOR) 20 MG tablet atorvastatin 20 mg tablet      azelastine (ASTELIN) 137 mcg (0.1 %) nasal spray azelastine 137 mcg (0.1 %) nasal spray aerosol   Spray 2 sprays twice a day by intranasal route.      dicyclomine (BENTYL) 20 mg tablet dicyclomine 20 mg tablet   Take 1 tablet 4 times a day by oral route for 30 days.      divalproex (DEPAKOTE ER) 250 MG 24 hr tablet Take 1 tablet (250 mg total) by mouth once daily. 60 tablet 4    fluticasone propionate (FLONASE) 50 mcg/actuation nasal spray fluticasone propionate 50 mcg/actuation nasal spray,suspension   SPRAY 1 SPRAY BY INTRANASAL ROUTE EVERY DAY      levothyroxine (SYNTHROID) 25 MCG tablet Take 25 mcg by mouth once daily.      lisinopril (PRINIVIL,ZESTRIL) 20 MG tablet       lisinopril-hydrochlorothiazide (PRINZIDE,ZESTORETIC) 20-12.5 mg per tablet lisinopril 20 mg-hydrochlorothiazide 12.5 mg tablet   Take 1 tablet(s) every day by oral route.      meclizine (ANTIVERT) 25 mg tablet Take 1 tablet (25 mg total) by mouth 3 (three) times daily as needed for Dizziness. (Patient not taking: Reported on 6/22/2023) 30 tablet 0    metFORMIN (GLUCOPHAGE) 500 MG tablet Take 500 mg by mouth 2 (two) times daily with meals.      nortriptyline (PAMELOR) 25 MG capsule Take 1 capsule (25 mg total) by mouth every evening. 30 capsule 11    pantoprazole (PROTONIX) 40 MG tablet Take 1 tablet (40 mg total) by mouth once daily. 90 tablet 3    sucralfate (CARAFATE) 1 gram tablet TAKE 1 TABLET (1 G TOTAL) BY MOUTH 3 (THREE) TIMES DAILY AS NEEDED (ABD SYMPTOMS). 270 tablet 1    VITAMIN D2  50,000 unit capsule        No current facility-administered medications for this visit.       ALLERGIES:  Review of patient's allergies indicates:  No Known Allergies    FAMILY HISTORY:  No family history on file.    SOCIAL HISTORY:  Social History     Tobacco Use    Smoking status: Never     Passive exposure: Never    Smokeless tobacco: Never   Substance Use Topics    Alcohol use: No    Drug use: No       Review of Systems:  12 system review of systems is negative except for the symptoms mentioned in HPI.      Objective:     Vitals:    06/23/23 0747   BP: 138/66   Pulse: 85   Weight: 82.6 kg (182 lb)     General: NAD, well nourished   Eyes: no tearing, discharge, no erythema   Neck: Supple, full range of motion  Cardiovascular: Warm and well perfused  Lungs: Normal work of breathing  Skin: No rash, lesions, or breakdown on exposed skin  Psychiatry: Mood and affect are appropriate     Physical Exam  Eyes:      Extraocular Movements: EOM normal.   Neck:      Comments: Stiffness of the trapezius and cervical paraspinal regions bilaterally  Musculoskeletal:      Cervical back: No rigidity. No pain with movement or muscular tenderness. Normal range of motion.   Neurological:      Mental Status: She is oriented to person, place, and time.      Coordination: Finger-Nose-Finger Test normal.      Gait: Gait is intact.      Deep Tendon Reflexes:      Reflex Scores:       Bicep reflexes are 2+ on the right side and 2+ on the left side.       Brachioradialis reflexes are 2+ on the right side and 2+ on the left side.       Patellar reflexes are 2+ on the right side and 2+ on the left side.  Psychiatric:         Speech: Speech normal.       NEUROLOGICAL EXAMINATION:     MENTAL STATUS   Oriented to person, place, and time.   Speech: speech is normal   Level of consciousness: alert    CRANIAL NERVES     CN II   Visual fields full to confrontation.     CN III, IV, VI   Extraocular motions are normal.   Ophthalmoparesis: none    CN  V   Facial sensation intact.     CN VII   Facial expression full, symmetric.     CN XI   CN XI normal.     CN XII   CN XII normal.     MOTOR EXAM   Right arm pronator drift: absent  Left arm pronator drift: absent       5/5 in bilateral upper and lower extremities     REFLEXES     Reflexes   Right brachioradialis: 2+  Left brachioradialis: 2+  Right biceps: 2+  Left biceps: 2+  Right patellar: 2+  Left patellar: 2+  Right plantar: normal  Left plantar: normal    SENSORY EXAM   Light touch normal.     GAIT AND COORDINATION     Gait  Gait: normal     Coordination   Finger to nose coordination: normal    Tremor   Intention tremor: absent      Images:    Other Studies:          Chuy Marie MD  Department of Neurology  [facility]

## 2023-06-22 NOTE — PROGRESS NOTES
Amber Khan returns to clinic today for continued evaluation of recurrent conjunctivitis, headaches, and asthma. She is here with her  [place].  She was last seen June 8, 2023.    Since her last visit, she is continued to have almost daily headaches.  They are frontal and may radiate into her neck.  She does have associated nausea and photophobia. She calls them sinus headaches.  She is seen Neurology tomorrow morning.     She also continues to have bilateral itchy eyes with tearing.     Her rhinitis has been controlled.     She does have some shortness of breath when walking up stairs.     She was diagnosed with asthma by Dr. Mcneill an Allergist in [place] that has retired.    She has been able to hold her antihistamines.  She has not had any azelastine.    Physical Examination:  General: Well-developed, well-nourished, no acute distress.  Head: No sinus tenderness.  Eyes: Conjunctivae:  No bulbar or palpebral conjunctival injection.  Ears: EAC's clear.  TM's scarred bilaterally.  No pre-auricular nodes.  Nose: Nasal Mucosa:  Pink.  Septum: No apparent deviation.  Turbinates:  No significant edema.  Polyps/Mass:  None visible.  Teeth/Gums:  No bleeding noted.  Oropharynx: No exudates.  Neck: Supple without thyromegaly. No cervical lymphadenopathy.    Respiratory/Chest: Effort: Good.  Auscultation:  Clear bilaterally.  Skin: Good turgor.  No urticaria or angioedema.    Neuro/Psych: Oriented x 3.    Laboratory 05/18/2023:   IgA:  111.   IgG:  10 16.   IgM:  40.   Pneumococcal titers:  Protective.    Laboratory 06/08/2023:   IgE level:  Less than 35.   ImmunoCAP:  Class 0/I:  DM f. (0.28).    Complete PFT 06/08/2023:  Spirometry is normal. Spirometry remains unimproved following bronchodilator. Lung volume determination shows TLC is normal. There is a reduction in FRC and/or RV of uncertain clinical significance. DLCO is mildly decreased.     Sinus CT 06/09/2023:  No CT evidence of acute or chronic  sinusitis.  Right deviation of the mid nasal septum.    Inhalant skin tests 06/22/2023: 3+ histamine control. All tests were negative.    Assessment:  1. Chronic rhinitis, not allergic.   2. Chronic conjunctivitis, not allergic.   3. History of asthma.  4. Chronic headaches, probably migraine.   5. GERD, controlled.   6. Hypertension on amlodipine and lisinopril-hydrochlorothiazide.   7. History of arthritis, probably osteoarthritis   8. Fibromyalgia.   9. Diabetes on metformin.  10. Hypothyroidism on replacement.   11. S/P PE tubes.   12. S/P sinus surgery.   13. Hyperlipidemia on Lipitor.     Recommendations:  1. Neurology evaluation as scheduled.   2. OTC antihistamines as needed.   3. Ophthalmology evaluation.   4. If symptoms persist, return to clinic for continued evaluation.    Patient education May 18, 2023:   Relationship between sinus headaches and migraine was reviewed.    Time attributed to the following activities: preparing to see the patient, obtaining and/or reviewing separately obtained history, performing a medically appropriate examination and/or evaluation, counseling and education the patient/family/caregiver, documenting clinical information in the electronic or other health record, independently interpreting results (not separately reported) and communicating results to the patient/family/caregiver, care coordination (not separately reported), ordering medications, tests, or procedures, and referring and communications with other health care professionals (not separately reported).  20 minutes.

## 2023-06-23 ENCOUNTER — OFFICE VISIT (OUTPATIENT)
Dept: NEUROLOGY | Facility: CLINIC | Age: 58
End: 2023-06-23
Payer: COMMERCIAL

## 2023-06-23 VITALS
BODY MASS INDEX: 31.24 KG/M2 | WEIGHT: 182 LBS | SYSTOLIC BLOOD PRESSURE: 138 MMHG | HEART RATE: 85 BPM | DIASTOLIC BLOOD PRESSURE: 66 MMHG

## 2023-06-23 DIAGNOSIS — G43.809 OTHER MIGRAINE WITHOUT STATUS MIGRAINOSUS, NOT INTRACTABLE: ICD-10-CM

## 2023-06-23 DIAGNOSIS — G44.229 CHRONIC TENSION-TYPE HEADACHE, NOT INTRACTABLE: Primary | ICD-10-CM

## 2023-06-23 PROBLEM — G43.909 MIGRAINE WITHOUT STATUS MIGRAINOSUS, NOT INTRACTABLE: Status: ACTIVE | Noted: 2023-06-23

## 2023-06-23 PROCEDURE — 99999 PR PBB SHADOW E&M-EST. PATIENT-LVL IV: CPT | Mod: PBBFAC,,, | Performed by: STUDENT IN AN ORGANIZED HEALTH CARE EDUCATION/TRAINING PROGRAM

## 2023-06-23 PROCEDURE — 99999 PR PBB SHADOW E&M-EST. PATIENT-LVL IV: ICD-10-PCS | Mod: PBBFAC,,, | Performed by: STUDENT IN AN ORGANIZED HEALTH CARE EDUCATION/TRAINING PROGRAM

## 2023-06-23 PROCEDURE — 99203 PR OFFICE/OUTPT VISIT, NEW, LEVL III, 30-44 MIN: ICD-10-PCS | Mod: S$GLB,,, | Performed by: STUDENT IN AN ORGANIZED HEALTH CARE EDUCATION/TRAINING PROGRAM

## 2023-06-23 PROCEDURE — 99203 OFFICE O/P NEW LOW 30 MIN: CPT | Mod: S$GLB,,, | Performed by: STUDENT IN AN ORGANIZED HEALTH CARE EDUCATION/TRAINING PROGRAM

## 2023-06-23 RX ORDER — DIVALPROEX SODIUM 250 MG/1
250 TABLET, FILM COATED, EXTENDED RELEASE ORAL DAILY
Qty: 60 TABLET | Refills: 4 | Status: SHIPPED | OUTPATIENT
Start: 2023-06-23 | End: 2024-03-18

## 2023-08-25 DIAGNOSIS — R14.0 BLOATING: ICD-10-CM

## 2023-08-25 DIAGNOSIS — R10.9 ABDOMINAL PAIN, UNSPECIFIED ABDOMINAL LOCATION: ICD-10-CM

## 2023-08-25 DIAGNOSIS — K21.9 GASTROESOPHAGEAL REFLUX DISEASE, UNSPECIFIED WHETHER ESOPHAGITIS PRESENT: ICD-10-CM

## 2023-08-25 RX ORDER — SUCRALFATE 1 G/1
1 TABLET ORAL 3 TIMES DAILY PRN
Qty: 270 TABLET | Refills: 1 | Status: SHIPPED | OUTPATIENT
Start: 2023-08-25

## 2023-09-28 DIAGNOSIS — R14.0 BLOATING: ICD-10-CM

## 2023-09-28 DIAGNOSIS — K21.9 GASTROESOPHAGEAL REFLUX DISEASE, UNSPECIFIED WHETHER ESOPHAGITIS PRESENT: ICD-10-CM

## 2023-09-28 DIAGNOSIS — R10.9 ABDOMINAL PAIN, UNSPECIFIED ABDOMINAL LOCATION: ICD-10-CM

## 2023-09-28 RX ORDER — PANTOPRAZOLE SODIUM 40 MG/1
40 TABLET, DELAYED RELEASE ORAL DAILY
Qty: 90 TABLET | Refills: 3 | Status: SHIPPED | OUTPATIENT
Start: 2023-09-28 | End: 2023-10-09

## 2023-10-04 DIAGNOSIS — R14.0 BLOATING: ICD-10-CM

## 2023-10-04 DIAGNOSIS — R10.9 ABDOMINAL PAIN, UNSPECIFIED ABDOMINAL LOCATION: ICD-10-CM

## 2023-10-04 DIAGNOSIS — K21.9 GASTROESOPHAGEAL REFLUX DISEASE, UNSPECIFIED WHETHER ESOPHAGITIS PRESENT: ICD-10-CM

## 2023-10-04 RX ORDER — PANTOPRAZOLE SODIUM 40 MG/1
40 TABLET, DELAYED RELEASE ORAL DAILY
Qty: 90 TABLET | Refills: 3 | OUTPATIENT
Start: 2023-10-04

## 2023-10-08 DIAGNOSIS — K21.9 GASTROESOPHAGEAL REFLUX DISEASE, UNSPECIFIED WHETHER ESOPHAGITIS PRESENT: ICD-10-CM

## 2023-10-08 DIAGNOSIS — R14.0 BLOATING: ICD-10-CM

## 2023-10-08 DIAGNOSIS — R10.9 ABDOMINAL PAIN, UNSPECIFIED ABDOMINAL LOCATION: ICD-10-CM

## 2023-10-09 RX ORDER — LANSOPRAZOLE 30 MG/1
30 CAPSULE, DELAYED RELEASE ORAL DAILY
Qty: 90 CAPSULE | Refills: 3 | Status: SHIPPED | OUTPATIENT
Start: 2023-10-09

## 2023-10-18 ENCOUNTER — TELEPHONE (OUTPATIENT)
Dept: RHEUMATOLOGY | Facility: CLINIC | Age: 58
End: 2023-10-18
Payer: COMMERCIAL

## 2023-10-18 NOTE — TELEPHONE ENCOUNTER
Spoke with patient informing her that Dr. Jaramillo will no longer be working at [facility]. I offered to have her scheduled with the new Rheumatologist in [place]. However she states that is kind of far for her. She will look for another provider on her own.

## 2023-11-05 ENCOUNTER — OFFICE VISIT (OUTPATIENT)
Dept: URGENT CARE | Facility: CLINIC | Age: 58
End: 2023-11-05
Payer: COMMERCIAL

## 2023-11-05 VITALS
TEMPERATURE: 99 F | DIASTOLIC BLOOD PRESSURE: 93 MMHG | HEIGHT: 64 IN | HEART RATE: 82 BPM | SYSTOLIC BLOOD PRESSURE: 157 MMHG | RESPIRATION RATE: 16 BRPM | BODY MASS INDEX: 31.07 KG/M2 | WEIGHT: 182 LBS | OXYGEN SATURATION: 96 %

## 2023-11-05 DIAGNOSIS — J06.9 VIRAL URI WITH COUGH: Primary | ICD-10-CM

## 2023-11-05 PROBLEM — J34.3 HYPERTROPHY OF NASAL TURBINATES: Status: ACTIVE | Noted: 2023-11-05

## 2023-11-05 PROBLEM — E78.00 HYPERCHOLESTEROLEMIA: Status: ACTIVE | Noted: 2023-06-09

## 2023-11-05 PROBLEM — N39.3 STRESS INCONTINENCE IN FEMALE: Status: ACTIVE | Noted: 2023-11-05

## 2023-11-05 PROBLEM — J34.2 DEVIATED NASAL SEPTUM: Status: ACTIVE | Noted: 2023-11-05

## 2023-11-05 PROBLEM — K57.32 DIVERTICULITIS OF LARGE INTESTINE WITHOUT PERFORATION OR ABSCESS WITHOUT BLEEDING: Status: ACTIVE | Noted: 2022-04-21

## 2023-11-05 PROBLEM — J32.9 CHRONIC SINUSITIS: Status: ACTIVE | Noted: 2023-11-05

## 2023-11-05 PROBLEM — E03.9 HYPOTHYROIDISM: Status: ACTIVE | Noted: 2023-11-05

## 2023-11-05 PROBLEM — N95.1 MENOPAUSAL STATE: Status: ACTIVE | Noted: 2023-08-03

## 2023-11-05 PROBLEM — F41.8 MIXED ANXIETY DEPRESSIVE DISORDER: Status: ACTIVE | Noted: 2023-11-05

## 2023-11-05 LAB
CTP QC/QA: YES
SARS-COV-2 AG RESP QL IA.RAPID: NEGATIVE

## 2023-11-05 PROCEDURE — 87811 SARS CORONAVIRUS 2 ANTIGEN POCT, MANUAL READ: ICD-10-PCS | Mod: QW,S$GLB,, | Performed by: PHYSICIAN ASSISTANT

## 2023-11-05 PROCEDURE — 99213 PR OFFICE/OUTPT VISIT, EST, LEVL III, 20-29 MIN: ICD-10-PCS | Mod: S$GLB,,, | Performed by: PHYSICIAN ASSISTANT

## 2023-11-05 PROCEDURE — 99213 OFFICE O/P EST LOW 20 MIN: CPT | Mod: S$GLB,,, | Performed by: PHYSICIAN ASSISTANT

## 2023-11-05 PROCEDURE — 87811 SARS-COV-2 COVID19 W/OPTIC: CPT | Mod: QW,S$GLB,, | Performed by: PHYSICIAN ASSISTANT

## 2023-11-05 RX ORDER — BENZONATATE 200 MG/1
200 CAPSULE ORAL 3 TIMES DAILY PRN
Qty: 30 CAPSULE | Refills: 0 | Status: SHIPPED | OUTPATIENT
Start: 2023-11-05 | End: 2023-11-15

## 2023-11-05 RX ORDER — PROMETHAZINE HYDROCHLORIDE AND DEXTROMETHORPHAN HYDROBROMIDE 6.25; 15 MG/5ML; MG/5ML
5 SYRUP ORAL NIGHTLY PRN
Qty: 50 ML | Refills: 0 | Status: SHIPPED | OUTPATIENT
Start: 2023-11-05 | End: 2023-11-15

## 2023-11-05 RX ORDER — METHYLPREDNISOLONE 4 MG/1
TABLET ORAL
Qty: 21 EACH | Refills: 0 | Status: SHIPPED | OUTPATIENT
Start: 2023-11-05 | End: 2023-11-25

## 2023-11-05 NOTE — PATIENT INSTRUCTIONS
URI   If your condition worsens or fails to improve we recommend that you receive another evaluation at the urgent care/ER immediately or contact your PCP to discuss your concerns. You must understand that you've received an urgent care treatment only and that you may be released before all your medical problems are known or treated. You the patient will arrange for follouwp care as instructed.   If we discussed that I think your illness is viral, it will not respond to antibiotics and will last 10-14 days.     Take steroids as prescribed with food.Steroids can elevate your blood pressure, elevate your blood sugar, water weight gain, nervous energy, redness to the face.     Use tessalon during the day and cough syrup at night. This can be very sedating please do not drink alcohol or drive with this medication.   Rest and fluids are also important.     Salt water gargles, warm tea with honey and chloraseptic spray as needed for sore throat.   Tylenol or ibuprofen can also be used as directed for pain unless you have an allergy to them or medical condition such as stomach ulcers, kidney or liver disease or blood thinners etc for which you should not be taking these type of medications.

## 2023-11-05 NOTE — PROGRESS NOTES
"Subjective:      Patient ID: Amber Khan is a 58 y.o. female.    Vitals:  height is 5' 4" (1.626 m) and weight is 82.6 kg (182 lb). Her oral temperature is 98.5 °F (36.9 °C). Her blood pressure is 157/93 (abnormal) and her pulse is 82. Her respiration is 16 and oxygen saturation is 96%.     Chief Complaint: Cough    This is a 58 y.o. female who presents today with a chief complaint of headaches sore throat cough and lack of sleep bodyaches that started 4 day ago. Pt took mucinex pt feels chest congestion from the coughing       Cough  This is a new problem. The current episode started in the past 7 days. The problem has been unchanged. The problem occurs constantly. The cough is Non-productive. Associated symptoms include headaches, postnasal drip and a sore throat. Pertinent negatives include no chest pain, chills, ear congestion, ear pain, fever, heartburn, hemoptysis, myalgias, nasal congestion, rash, rhinorrhea, shortness of breath, sweats, weight loss or wheezing. Associated symptoms comments: Sinus pain and pressure in the face . Nothing aggravates the symptoms. She has tried nothing for the symptoms. There is no history of asthma, bronchiectasis, bronchitis, COPD, emphysema, environmental allergies or pneumonia.     Constitution: Positive for fatigue. Negative for appetite change, chills, sweating and fever.   HENT:  Positive for congestion, postnasal drip, sinus pressure and sore throat. Negative for ear pain, ear discharge, tinnitus, hearing loss, dental problem, drooling, mouth sores, tongue pain, tongue lesion, facial swelling, sinus pain, trouble swallowing and voice change.    Neck: Negative for neck pain, neck stiffness and painful lymph nodes.   Cardiovascular:  Negative for chest pain and sob on exertion.   Eyes:  Negative for eye discharge and eye itching.   Respiratory:  Positive for cough. Negative for chest tightness, sputum production, bloody sputum, shortness of breath and wheezing.  "   Gastrointestinal:  Negative for abdominal pain, vomiting, constipation, diarrhea and heartburn.   Musculoskeletal:  Negative for muscle cramps and muscle ache.   Skin:  Negative for rash.   Allergic/Immunologic: Negative for environmental allergies.   Neurological:  Positive for headaches. Negative for dizziness and altered mental status.   Hematologic/Lymphatic: Negative for swollen lymph nodes.   Psychiatric/Behavioral:  Negative for altered mental status.       Past Medical History:   Diagnosis Date    Allergy     Asthma     Fibromyalgia 2009    Hyperlipidemia     Hypertension     Hypothyroidism        Past Surgical History:   Procedure Laterality Date    COLONOSCOPY N/A 04/14/2023    Procedure: COLONOSCOPY;  Surgeon: Luis Carlos Wilkins MD;  Location: [facility];  Service: Endoscopy;  Laterality: N/A;    HYSTERECTOMY      TUBAL LIGATION      TYMPANOSTOMY TUBE PLACEMENT         History reviewed. No pertinent family history.    Social History     Socioeconomic History    Marital status:    Tobacco Use    Smoking status: Never     Passive exposure: Never    Smokeless tobacco: Never   Substance and Sexual Activity    Alcohol use: No    Drug use: No    Sexual activity: Not Currently     Partners: Male       Current Outpatient Medications   Medication Sig Dispense Refill    albuterol (PROVENTIL/VENTOLIN HFA) 90 mcg/actuation inhaler ProAir HFA 90 mcg/actuation aerosol inhaler   Inhale 2 puffs every 4-6 hours by inhalation route as needed.      amLODIPine (NORVASC) 10 MG tablet       atorvastatin (LIPITOR) 20 MG tablet atorvastatin 20 mg tablet      azelastine (ASTELIN) 137 mcg (0.1 %) nasal spray azelastine 137 mcg (0.1 %) nasal spray aerosol   Spray 2 sprays twice a day by intranasal route.      dicyclomine (BENTYL) 20 mg tablet dicyclomine 20 mg tablet   Take 1 tablet 4 times a day by oral route for 30 days.      divalproex (DEPAKOTE ER) 250 MG 24 hr tablet Take 1 tablet (250 mg total) by mouth once daily. 60  tablet 4    fluticasone propionate (FLONASE) 50 mcg/actuation nasal spray fluticasone propionate 50 mcg/actuation nasal spray,suspension   SPRAY 1 SPRAY BY INTRANASAL ROUTE EVERY DAY      lansoprazole (PREVACID) 30 MG capsule Take 1 capsule (30 mg total) by mouth once daily. 90 capsule 3    levothyroxine (SYNTHROID) 25 MCG tablet Take 25 mcg by mouth once daily.      lisinopril (PRINIVIL,ZESTRIL) 20 MG tablet       lisinopril-hydrochlorothiazide (PRINZIDE,ZESTORETIC) 20-12.5 mg per tablet lisinopril 20 mg-hydrochlorothiazide 12.5 mg tablet   Take 1 tablet(s) every day by oral route.      meclizine (ANTIVERT) 25 mg tablet Take 1 tablet (25 mg total) by mouth 3 (three) times daily as needed for Dizziness. 30 tablet 0    metFORMIN (GLUCOPHAGE) 500 MG tablet Take 500 mg by mouth 2 (two) times daily with meals.      nortriptyline (PAMELOR) 25 MG capsule Take 1 capsule (25 mg total) by mouth every evening. 30 capsule 11    sucralfate (CARAFATE) 1 gram tablet TAKE 1 TABLET (1 G TOTAL) BY MOUTH 3 (THREE) TIMES DAILY AS NEEDED (ABD SYMPTOMS). 270 tablet 1    VITAMIN D2 50,000 unit capsule        No current facility-administered medications for this visit.       Review of patient's allergies indicates:  No Known Allergies    Objective:     Physical Exam   Constitutional: She is oriented to person, place, and time. She appears well-developed. She is cooperative.  Non-toxic appearance. She does not appear ill. No distress.   HENT:   Head: Normocephalic and atraumatic.   Ears:   Right Ear: Hearing, external ear and ear canal normal. Tympanic membrane is not injected, not erythematous, not retracted and not bulging. A middle ear effusion is present. impacted cerumen  Left Ear: Hearing and external ear normal. Tympanic membrane is not injected, not erythematous, not retracted and not bulging. A middle ear effusion is present. impacted cerumen  Nose: Rhinorrhea and congestion present. No mucosal edema or nasal deformity. No  epistaxis. Right sinus exhibits no maxillary sinus tenderness and no frontal sinus tenderness. Left sinus exhibits no maxillary sinus tenderness and no frontal sinus tenderness.   Mouth/Throat: Uvula is midline and mucous membranes are normal. Mucous membranes are moist. No trismus in the jaw. Normal dentition. No uvula swelling. Posterior oropharyngeal erythema present. No oropharyngeal exudate or posterior oropharyngeal edema.   Eyes: Conjunctivae and lids are normal. Right eye exhibits no discharge. Left eye exhibits no discharge. No scleral icterus.   Neck: Trachea normal and phonation normal. Neck supple. No edema present. No erythema present. No neck rigidity present.   Cardiovascular: Normal rate, regular rhythm, normal heart sounds and normal pulses.   No murmur heard.Exam reveals no gallop and no friction rub.   Pulmonary/Chest: Effort normal and breath sounds normal. No stridor. No respiratory distress. She has no decreased breath sounds. She has no wheezes. She has no rhonchi. She has no rales.   Abdominal: Normal appearance.   Musculoskeletal:      Cervical back: She exhibits tenderness.   Lymphadenopathy:     She has no cervical adenopathy.   Neurological: She is alert and oriented to person, place, and time. She displays no weakness. She exhibits normal muscle tone. Coordination normal.   Skin: Skin is warm, dry, intact, not diaphoretic, not pale and no rash.   Psychiatric: Her speech is normal and behavior is normal. Mood, judgment and thought content normal.   Nursing note and vitals reviewed.    Results for orders placed or performed in visit on 11/05/23   SARS Coronavirus 2 Antigen, POCT Manual Read   Result Value Ref Range    SARS Coronavirus 2 Antigen Negative Negative     Acceptable Yes          Assessment:     1. Viral URI with cough        Plan:       Viral URI with cough  -     SARS Coronavirus 2 Antigen, POCT Manual Read  -     methylPREDNISolone (MEDROL DOSEPACK) 4 mg tablet;  use as directed  Dispense: 21 each; Refill: 0  -     benzonatate (TESSALON) 200 MG capsule; Take 1 capsule (200 mg total) by mouth 3 (three) times daily as needed for Cough.  Dispense: 30 capsule; Refill: 0  -     promethazine-dextromethorphan (PROMETHAZINE-DM) 6.25-15 mg/5 mL Syrp; Take 5 mLs by mouth nightly as needed (cough).  Dispense: 50 mL; Refill: 0    Results reviewed  I have reviewed the patient chart and pertinent past imaging/labs.  Patient Instructions                                                            URI   If your condition worsens or fails to improve we recommend that you receive another evaluation at the urgent care/ER immediately or contact your PCP to discuss your concerns. You must understand that you've received an urgent care treatment only and that you may be released before all your medical problems are known or treated. You the patient will arrange for follouwp care as instructed.   If we discussed that I think your illness is viral, it will not respond to antibiotics and will last 10-14 days.     Take steroids as prescribed with food.Steroids can elevate your blood pressure, elevate your blood sugar, water weight gain, nervous energy, redness to the face.     Use tessalon during the day and cough syrup at night. This can be very sedating please do not drink alcohol or drive with this medication.   Rest and fluids are also important.     Salt water gargles, warm tea with honey and chloraseptic spray as needed for sore throat.   Tylenol or ibuprofen can also be used as directed for pain unless you have an allergy to them or medical condition such as stomach ulcers, kidney or liver disease or blood thinners etc for which you should not be taking these type of medications.

## 2023-11-25 ENCOUNTER — OFFICE VISIT (OUTPATIENT)
Dept: URGENT CARE | Facility: CLINIC | Age: 58
End: 2023-11-25
Payer: COMMERCIAL

## 2023-11-25 VITALS
SYSTOLIC BLOOD PRESSURE: 147 MMHG | TEMPERATURE: 99 F | WEIGHT: 182 LBS | BODY MASS INDEX: 31.07 KG/M2 | RESPIRATION RATE: 17 BRPM | OXYGEN SATURATION: 98 % | HEIGHT: 64 IN | HEART RATE: 90 BPM | DIASTOLIC BLOOD PRESSURE: 85 MMHG

## 2023-11-25 DIAGNOSIS — R09.82 PND (POST-NASAL DRIP): Primary | ICD-10-CM

## 2023-11-25 DIAGNOSIS — J40 BRONCHITIS: ICD-10-CM

## 2023-11-25 DIAGNOSIS — R05.8 POST-VIRAL COUGH SYNDROME: ICD-10-CM

## 2023-11-25 PROCEDURE — 99214 PR OFFICE/OUTPT VISIT, EST, LEVL IV, 30-39 MIN: ICD-10-PCS | Mod: 25,S$GLB,, | Performed by: FAMILY MEDICINE

## 2023-11-25 PROCEDURE — 99214 OFFICE O/P EST MOD 30 MIN: CPT | Mod: 25,S$GLB,, | Performed by: FAMILY MEDICINE

## 2023-11-25 PROCEDURE — 96372 THER/PROPH/DIAG INJ SC/IM: CPT | Mod: S$GLB,,, | Performed by: FAMILY MEDICINE

## 2023-11-25 PROCEDURE — 96372 PR INJECTION,THERAP/PROPH/DIAG2ST, IM OR SUBCUT: ICD-10-PCS | Mod: S$GLB,,, | Performed by: FAMILY MEDICINE

## 2023-11-25 RX ORDER — ALBUTEROL SULFATE 90 UG/1
2 AEROSOL, METERED RESPIRATORY (INHALATION) EVERY 4 HOURS PRN
Qty: 18 G | Refills: 0 | Status: SHIPPED | OUTPATIENT
Start: 2023-11-25 | End: 2024-02-23

## 2023-11-25 RX ORDER — DEXAMETHASONE SODIUM PHOSPHATE 100 MG/10ML
10 INJECTION INTRAMUSCULAR; INTRAVENOUS
Status: COMPLETED | OUTPATIENT
Start: 2023-11-25 | End: 2023-11-25

## 2023-11-25 RX ORDER — CETIRIZINE HYDROCHLORIDE 10 MG/1
10 TABLET ORAL DAILY
Qty: 30 TABLET | Refills: 0 | Status: SHIPPED | OUTPATIENT
Start: 2023-11-25 | End: 2023-12-25

## 2023-11-25 RX ORDER — INHALER, ASSIST DEVICES
SPACER (EA) MISCELLANEOUS
Qty: 1 EACH | Refills: 0 | Status: SHIPPED | OUTPATIENT
Start: 2023-11-25

## 2023-11-25 RX ORDER — BENZONATATE 200 MG/1
200 CAPSULE ORAL 3 TIMES DAILY PRN
Qty: 30 CAPSULE | Refills: 0 | Status: SHIPPED | OUTPATIENT
Start: 2023-11-25 | End: 2023-12-05

## 2023-11-25 RX ORDER — FLUTICASONE PROPIONATE 50 MCG
1 SPRAY, SUSPENSION (ML) NASAL DAILY
Qty: 9.9 ML | Refills: 0 | Status: SHIPPED | OUTPATIENT
Start: 2023-11-25

## 2023-11-25 RX ORDER — CODEINE PHOSPHATE AND GUAIFENESIN 10; 100 MG/5ML; MG/5ML
10 SOLUTION ORAL 3 TIMES DAILY PRN
Qty: 100 ML | Refills: 0 | Status: SHIPPED | OUTPATIENT
Start: 2023-11-25 | End: 2023-12-05

## 2023-11-25 RX ADMIN — DEXAMETHASONE SODIUM PHOSPHATE 10 MG: 100 INJECTION INTRAMUSCULAR; INTRAVENOUS at 12:11

## 2023-11-25 NOTE — PROGRESS NOTES
"Subjective:      Patient ID: Amber Khan is a 58 y.o. female.    Vitals:  height is 5' 4" (1.626 m) and weight is 82.6 kg (182 lb). Her oral temperature is 98.5 °F (36.9 °C). Her blood pressure is 147/85 (abnormal) and her pulse is 90. Her respiration is 17 and oxygen saturation is 98%.     Chief Complaint: Sinus Problem    Pt states her symptoms started a  month ago. Pt states this is her 2nd time coming for these symptoms. Pt states she was starting to feel better from when her symptoms started and on Thanksgiving day she started coughing a lot. Pt states she cough so much its uncomfortable and unbearable. Pt states she hear noises coming from her chest. Pt states her eyes hurt and very fatigue from lack of sleep. Pt have tried several OTC remedies and medications with no relief.     Sinus Problem  This is a new problem. The current episode started 1 to 4 weeks ago. The problem has been gradually worsening since onset. Associated symptoms include congestion, coughing, headaches, sinus pressure, a sore throat and swollen glands. Past treatments include nasal decongestants and oral decongestants (hot tea, vaporub). The treatment provided no relief.       HENT:  Positive for congestion, sinus pressure and sore throat.    Respiratory:  Positive for cough.    Neurological:  Positive for headaches.      Objective:     Physical Exam   Constitutional: She is oriented to person, place, and time.  Non-toxic appearance. She appears ill.   HENT:   Head: Normocephalic.   Ears:   Right Ear: External ear normal.   Left Ear: External ear normal.   Nose: Rhinorrhea and congestion present.   Mouth/Throat: Mucous membranes are moist.   Eyes: Conjunctivae are normal.   Cardiovascular: Normal rate.   Pulmonary/Chest: Effort normal. She has no wheezes. She has rhonchi. She has no rales.   Musculoskeletal: Normal range of motion.         General: Normal range of motion.   Neurological: She is alert and oriented to person, place, and " time.   Skin: Skin is dry.   Psychiatric: Her behavior is normal.       Assessment:     1. PND (post-nasal drip)    2. Post-viral cough syndrome    3. Bronchitis        Plan:       PND (post-nasal drip)  -     benzonatate (TESSALON) 200 MG capsule; Take 1 capsule (200 mg total) by mouth 3 (three) times daily as needed for Cough.  Dispense: 30 capsule; Refill: 0  -     guaiFENesin-codeine 100-10 mg/5 ml (TUSSI-ORGANIDIN NR)  mg/5 mL syrup; Take 10 mLs by mouth 3 (three) times daily as needed for Cough.  Dispense: 100 mL; Refill: 0    Post-viral cough syndrome  -     benzonatate (TESSALON) 200 MG capsule; Take 1 capsule (200 mg total) by mouth 3 (three) times daily as needed for Cough.  Dispense: 30 capsule; Refill: 0  -     guaiFENesin-codeine 100-10 mg/5 ml (TUSSI-ORGANIDIN NR)  mg/5 mL syrup; Take 10 mLs by mouth 3 (three) times daily as needed for Cough.  Dispense: 100 mL; Refill: 0  -     fluticasone propionate (FLONASE) 50 mcg/actuation nasal spray; 1 spray (50 mcg total) by Each Nostril route once daily.  Dispense: 9.9 mL; Refill: 0  -     cetirizine (ZYRTEC) 10 MG tablet; Take 1 tablet (10 mg total) by mouth once daily.  Dispense: 30 tablet; Refill: 0    Bronchitis  -     dexAMETHasone injection 10 mg  -     albuterol (VENTOLIN HFA) 90 mcg/actuation inhaler; Inhale 2 puffs into the lungs every 4 (four) hours as needed for Wheezing or Shortness of Breath. Rescue  Dispense: 18 g; Refill: 0  -     inhalation spacing device (BREATHERITE MDI SPACER); Use as directed for inhalation.  Dispense: 1 each; Refill: 0     One month of persistent symptoms. Was treated by pcp with antibiotics already. She has a horrible productive cough. No fevers. Cannot sleep due to persistent cough. Will treat as above.   You received a steroid shot today - this can   elevate your blood pressure,   elevate your blood sugar,   water weight gain,   nervous energy,   redness to the face and   dimpling of the skin where the shot  goes in.   Pt accepts and acknowledges risk associated with steroid injections.   'RTC PRN

## 2024-03-15 DIAGNOSIS — K58.8 OTHER IRRITABLE BOWEL SYNDROME: ICD-10-CM

## 2024-03-16 DIAGNOSIS — G43.809 OTHER MIGRAINE WITHOUT STATUS MIGRAINOSUS, NOT INTRACTABLE: ICD-10-CM

## 2024-03-18 RX ORDER — DIVALPROEX SODIUM 250 MG/1
250 TABLET, FILM COATED, EXTENDED RELEASE ORAL
Qty: 90 TABLET | Refills: 3 | Status: SHIPPED | OUTPATIENT
Start: 2024-03-18 | End: 2024-06-06 | Stop reason: SDUPTHER

## 2024-03-18 RX ORDER — NORTRIPTYLINE HYDROCHLORIDE 25 MG/1
25 CAPSULE ORAL NIGHTLY
Qty: 90 CAPSULE | Refills: 3 | Status: SHIPPED | OUTPATIENT
Start: 2024-03-18

## 2024-05-09 ENCOUNTER — CLINICAL SUPPORT (OUTPATIENT)
Dept: OTOLARYNGOLOGY | Facility: CLINIC | Age: 59
End: 2024-05-09
Payer: COMMERCIAL

## 2024-05-09 ENCOUNTER — OFFICE VISIT (OUTPATIENT)
Dept: OTOLARYNGOLOGY | Facility: CLINIC | Age: 59
End: 2024-05-09
Payer: COMMERCIAL

## 2024-05-09 VITALS
SYSTOLIC BLOOD PRESSURE: 145 MMHG | DIASTOLIC BLOOD PRESSURE: 88 MMHG | HEART RATE: 89 BPM | WEIGHT: 194.31 LBS | BODY MASS INDEX: 33.36 KG/M2

## 2024-05-09 DIAGNOSIS — R51.9 CHRONIC NONINTRACTABLE HEADACHE, UNSPECIFIED HEADACHE TYPE: Chronic | ICD-10-CM

## 2024-05-09 DIAGNOSIS — H92.03 EAR PAIN, BILATERAL: ICD-10-CM

## 2024-05-09 DIAGNOSIS — H90.3 SENSORINEURAL HEARING LOSS (SNHL) OF BOTH EARS: ICD-10-CM

## 2024-05-09 DIAGNOSIS — J30.9 ALLERGIC RHINITIS, UNSPECIFIED SEASONALITY, UNSPECIFIED TRIGGER: Chronic | ICD-10-CM

## 2024-05-09 DIAGNOSIS — Z86.69 HISTORY OF EAR INFECTIONS: Primary | ICD-10-CM

## 2024-05-09 DIAGNOSIS — J01.00 ACUTE NON-RECURRENT MAXILLARY SINUSITIS: Primary | ICD-10-CM

## 2024-05-09 DIAGNOSIS — H91.93 HIGH FREQUENCY HEARING LOSS OF BOTH EARS: ICD-10-CM

## 2024-05-09 DIAGNOSIS — G89.29 CHRONIC NONINTRACTABLE HEADACHE, UNSPECIFIED HEADACHE TYPE: Chronic | ICD-10-CM

## 2024-05-09 PROCEDURE — 99999 PR PBB SHADOW E&M-EST. PATIENT-LVL I: CPT | Mod: PBBFAC,,, | Performed by: PHYSICIAN ASSISTANT

## 2024-05-09 PROCEDURE — 99204 OFFICE O/P NEW MOD 45 MIN: CPT | Mod: S$GLB,,, | Performed by: OTOLARYNGOLOGY

## 2024-05-09 PROCEDURE — 92567 TYMPANOMETRY: CPT | Mod: S$GLB,,, | Performed by: PHYSICIAN ASSISTANT

## 2024-05-09 PROCEDURE — 92552 PURE TONE AUDIOMETRY AIR: CPT | Mod: S$GLB,,, | Performed by: PHYSICIAN ASSISTANT

## 2024-05-09 PROCEDURE — 92556 SPEECH AUDIOMETRY COMPLETE: CPT | Mod: S$GLB,,, | Performed by: PHYSICIAN ASSISTANT

## 2024-05-09 PROCEDURE — 99999 PR PBB SHADOW E&M-EST. PATIENT-LVL IV: CPT | Mod: PBBFAC,,, | Performed by: OTOLARYNGOLOGY

## 2024-05-09 RX ORDER — AMOXICILLIN 500 MG/1
500 TABLET, FILM COATED ORAL EVERY 12 HOURS
Qty: 28 TABLET | Refills: 0 | Status: SHIPPED | OUTPATIENT
Start: 2024-05-09 | End: 2024-05-23

## 2024-05-09 RX ORDER — MONTELUKAST SODIUM 10 MG/1
10 TABLET ORAL NIGHTLY
Qty: 30 TABLET | Refills: 11 | Status: SHIPPED | OUTPATIENT
Start: 2024-05-09 | End: 2025-05-09

## 2024-05-09 RX ORDER — AZELASTINE HYDROCHLORIDE, FLUTICASONE PROPIONATE 137; 50 UG/1; UG/1
1 SPRAY, METERED NASAL DAILY
Qty: 23 G | Refills: 11 | Status: SHIPPED | OUTPATIENT
Start: 2024-05-09 | End: 2025-05-09

## 2024-05-09 RX ORDER — OLOPATADINE HYDROCHLORIDE 1 MG/ML
1 SOLUTION/ DROPS OPHTHALMIC 2 TIMES DAILY
Qty: 5 ML | Refills: 3 | Status: SHIPPED | OUTPATIENT
Start: 2024-05-09 | End: 2025-05-09

## 2024-05-09 RX ORDER — PREDNISONE 10 MG/1
10 TABLET ORAL DAILY
Qty: 10 TABLET | Refills: 0 | Status: SHIPPED | OUTPATIENT
Start: 2024-05-09

## 2024-05-09 NOTE — PROGRESS NOTES
"  Chief Complaint   Patient presents with    Otitis Media     Both Ears        HPI: Patient is a 59-year-old female with a history of ongoing eye irritation/allergic conjunctivitis, chronic rhinitis, status post balloon sinuplasty with Dr. Ramírez, chronic headaches.  Allergy symptoms are perennial but she does have seasonal exacerbations, with wintertime being the worst for her symptoms.  Her symptoms consist of nasal congestion, postnasal drip, headache, severe eye irritation.  She is previously seen Dr. García for allergies and had workup showing mild sensitivity to dust mites.  Prior skin testing many years ago was negative.  She continues to take Flonase b.i.d. and Zyrtec daily.  She complains of significant eye symptoms including itching and irritation and has been seen by an ophthalmologist.    Dr. García referred the patient to Neurology for her headaches and she was seen once and prescribed medication for migraines.  She has not had any follow-up.    Patient has also had previous PE tube insertion for Eustachian tube dysfunction.  She reports having "ear infections" recurrently over the last few months, but upon further questioning it appears the symptoms mostly have to do with ear pain and pressure when she has her allergy flare ups.                  Past Medical History:   Diagnosis Date    Allergy     Asthma     Fibromyalgia 2009    Hyperlipidemia     Hypertension     Hypothyroidism      Social History     Socioeconomic History    Marital status:    Tobacco Use    Smoking status: Never     Passive exposure: Never    Smokeless tobacco: Never   Substance and Sexual Activity    Alcohol use: No    Drug use: No    Sexual activity: Not Currently     Partners: Male     Social Determinants of Health     Financial Resource Strain: Low Risk  (4/21/2022)    Received from [facility] [employer]    Overall Financial Resource Strain ([facility])     Difficulty of Paying Living Expenses: Not hard at all   Food " Insecurity: Unknown (4/21/2022)    Received from [facility] [employer] [facility]    Hunger Vital Sign     Worried About Running Out of Food in the Last Year: Patient declined     Ran Out of Food in the Last Year: Patient declined   Transportation Needs: No Transportation Needs (4/21/2022)    [facility]    PRAPARE - Transportation     Lack of Transportation (Medical): No     Lack of Transportation (Non-Medical): No   Physical Activity: Unknown (4/21/2022)    Received from [facility] [employer] [facility]    Exercise Vital Sign     Days of Exercise per Week: 0 days   Stress: No Stress Concern Present (4/21/2022)    Received from [facility] [employer] [facility]    [place] [employer] - Occupational Stress Questionnaire     Feeling of Stress : Only a little   Housing Stability: Unknown (4/21/2022)    [facility]    Housing Stability Vital Sign     Unable to Pay for Housing in the Last Year: No     In the last 12 months, was there a time when you did not have a steady place to sleep or slept in a shelter (including now)?: No     Past Surgical History:   Procedure Laterality Date    COLONOSCOPY N/A 04/14/2023    Procedure: COLONOSCOPY;  Surgeon: Luis Carlos Wilkins MD;  Location: [facility];  Service: Endoscopy;  Laterality: N/A;    HYSTERECTOMY      TUBAL LIGATION      TYMPANOSTOMY TUBE PLACEMENT       No family history on file.        Review of Systems  General: negative for chills, fever or weight loss  Psychological: negative for mood changes or depression  Ophthalmic: negative for blurry vision, photophobia or eye pain  ENT: see HPI  Respiratory: no cough, shortness of breath, or wheezing  Cardiovascular: no chest pain or dyspnea on exertion  Gastrointestinal: no abdominal pain, change in bowel habits, or black/ bloody stools  Musculoskeletal: negative for gait disturbance or muscular weakness  Neurological: no syncope or seizures; no ataxia  Dermatological: negative  for pruritis,  rash and jaundice  Hematologic/lymphatic: no easy bruising, no new adenopathy      Physical Exam:    Vitals:    05/09/24 0803   BP: (!) 145/88   Pulse: 89         Constitutional:   She is oriented to person, place, and time. Vital signs are normal. She appears well-developed and well-nourished. She appears alert. She is cooperative.   Obese Normal speech.      Head:  Normocephalic and atraumatic. Salivary glands normal.  Facial strength is normal.      Ears:  Hearing normal to normal and whispered voice; external ear normal without scars, lesions, or masses; ear canal, tympanic membrane, and middle ear normal., right ear hearing normal to normal and whispered voice; external ear normal without scars, lesions, or masses; ear canal, tympanic membrane, and middle ear normal. and left ear hearing normal to normal and whispered voice; external ear normal without scars, lesions, or masses; ear canal, tympanic membrane, and middle ear normal..   Right Ear: No middle ear effusion.   Left Ear:  No middle ear effusion.     Nose:  Mucosal edema, rhinorrhea and septal deviation (slight deviation to right) present. No polyps. Turbinates abnormal and turbinate hypertrophy (3+, boggy, congested mucosa).  Right sinus exhibits maxillary sinus tenderness. Right sinus exhibits no frontal sinus tenderness. Left sinus exhibits maxillary sinus tenderness. Left sinus exhibits no frontal sinus tenderness.     Mouth/Throat  Oropharynx clear and moist without lesions or asymmetry, normal uvula midline, lips, teeth, and gums normal and oropharynx normal. No mucous membrane lesions. No oropharyngeal exudate, posterior oropharyngeal edema or posterior oropharyngeal erythema. Mirror exam not performed due to patient tolerance.  Mirror exam not performed due to patient tolerance.    Mallampati class 3      Neck:  Neck normal without thyromegaly masses, asymmetry, normal tracheal structure, crepitus, and tenderness, thyroid normal,  trachea normal, phonation normal, full range of motion with neck supple and no adenopathy. No JVD present. Carotid bruit is not present. No thyroid mass and no thyromegaly present.     She has no cervical adenopathy.     Cardiovascular:    Normal rate, regular rhythm and rate and rhythm, heart sounds, and pulses normal.              Pulmonary/Chest:   Effort and breath sounds normal.     Psychiatric:   She has a normal mood and affect. Her speech is normal and behavior is normal.     Neurological:   She is alert and oriented to person, place, and time. She has neurological normal, alert and oriented. No cranial nerve deficit.     Skin:   No abrasions, lacerations, lesions, or rashes.     CT SINUSES WITHOUT CONTRAST- reviewed images and findings with patient.  Exam from 2023     CLINICAL HISTORY:  Sinusitis, chronic or recurrent;  Chronic sinusitis, unspecified     TECHNIQUE:  Axial CT images performed through the head/paranasal sinuses.  Axial, sagittal and coronal reconstructions performed.  Contrast was not administered.     COMPARISON:  None.     FINDINGS:  The paranasal sinuses are presently essentially clear.  No air-fluid levels or aerated secretions to specifically indicate acute sinusitis.  No osseous thickening or sclerosis.  Mild patchy opacification right mastoid air cells.     Mid nasal septum mildly deviated towards the right.  Ostiomeatal units are patent.  Nasal cavity is unremarkable.     Limited intracranial evaluation is unremarkable.  Extracranial soft tissue structures appear within normal limits.     Impression:     No CT evidence of acute or chronic sinusitis.     Right deviation of the mid nasal septum.       Audiogram: Interpreted by me and reviewed with the patient today.  Mild high-frequency sensorineural loss only at 8000 hertz, tympanograms type a bilaterally.        Assessment:    ICD-10-CM ICD-9-CM    1. Acute non-recurrent maxillary sinusitis  J01.00 461.0 predniSONE (DELTASONE) 10 MG  tablet      amoxicillin (AMOXIL) 500 MG Tab      2. Allergic rhinitis, unspecified seasonality, unspecified trigger  J30.9 477.9 montelukast (SINGULAIR) 10 mg tablet      azelastine-fluticasone (DYMISTA) 137-50 mcg/spray Spry nassal spray      predniSONE (DELTASONE) 10 MG tablet      amoxicillin (AMOXIL) 500 MG Tab      olopatadine (PATANOL) 0.1 % ophthalmic solution      3. Chronic nonintractable headache, unspecified headache type  R51.9 784.0     G89.29        4. Ear pain, bilateral  H92.03 388.70       5. Sensorineural hearing loss (SNHL) of both ears  H90.3 389.18         The primary encounter diagnosis was Acute non-recurrent maxillary sinusitis. Diagnoses of Allergic rhinitis, unspecified seasonality, unspecified trigger, Chronic nonintractable headache, unspecified headache type, Ear pain, bilateral, and Sensorineural hearing loss (SNHL) of both ears were also pertinent to this visit.      Plan:    I will treat the patient today for acute sinusitis with amoxicillin and a steroid taper.    I have changed her nasal spray to Dymista and she will add montelukast daily to her daily Zyrtec.    I will have her follow-up with Allergy to decide if further workup is needed given changes in her symptoms.    She can use olopatadine p.r.n. for eye symptoms.    I discussed with her that her headache symptoms are not likely due to chronic sinusitis, as there is no evidence of that on previous CT.  We will assess her response to allergy therapy and how this impacts her headaches and decide upon further workup.    She will follow-up with the neurologist who she saw last year to discuss her ongoing headache, possible migraine, symptoms and better strategies for treatment.    She will follow-up with me in a few months and we will decide upon further workup and perform scope exam at that time if needed.    Lauryn Ochoa MD

## 2024-05-09 NOTE — PROGRESS NOTES
Amber Khan, a 59 y.o. female, was seen today in the clinic for an audiologic evaluation.  Patients main complaint was a history of ear infections for the past year along with suffering from headaches and watery eyes.  She denies hearing loss.    Audiogram results revealed a mild hearing loss at 8k Hz bilaterally.  Speech reception thresholds were noted at 10 dB in the right ear and 10 dB in the left ear.  Speech discrimination scores were 96% in the right ear and 92% in the left ear.  Tympanometry revealed Type A in the right ear and Type A in the left ear.     Recommendations:  Otologic evaluation  Annual audiogram  Hearing protection when in noise

## 2024-05-09 NOTE — PATIENT INSTRUCTIONS
Audiogram was reviewed in detail with the patient, and they understand their hearing loss.    I recommend annual audiograms as well as hearing protection in noise.       Needs follow up with neuro and allergy for management of allergies and headaches.     Eye drops as needed.  Continue zyrtec.   Add singulair.   Change to dymista nasal spray.     How do you use a Nasal Corticosteroid Spray?    Make sure you understand your dosing instructions. Spray only the number of prescribed sprays in each nostril. Read the package instructions before using your spray the first time.    Most corticosteroid sprays suggest the following steps:    Wash your hands well.    Gently blow your nose to clear the passageway.    Shake the container several times.    Tilt your head slightly downward.  Use the opposite hand from the nostril you will be spraying to hold the spray bottle.    Block one nostril with your finger.  Insert the nasal applicator into the other nostril.    Aim the spray toward the outer wall of the nostril.  Inhale slowly through the nose and press the .    Breathe out and repeat to apply the prescribed number of sprays.  Repeat these steps for the other nostril.     Avoid sneezing or blowing your nose right after spraying.

## 2024-05-16 ENCOUNTER — LAB VISIT (OUTPATIENT)
Dept: LAB | Facility: HOSPITAL | Age: 59
End: 2024-05-16
Payer: COMMERCIAL

## 2024-05-16 ENCOUNTER — OFFICE VISIT (OUTPATIENT)
Dept: ALLERGY | Facility: CLINIC | Age: 59
End: 2024-05-16
Payer: COMMERCIAL

## 2024-05-16 VITALS — BODY MASS INDEX: 33.38 KG/M2 | WEIGHT: 195.56 LBS | HEIGHT: 64 IN

## 2024-05-16 DIAGNOSIS — J34.3 HYPERTROPHY OF NASAL TURBINATES: ICD-10-CM

## 2024-05-16 DIAGNOSIS — J34.2 DEVIATED NASAL SEPTUM: ICD-10-CM

## 2024-05-16 DIAGNOSIS — H10.423 SIMPLE CHRONIC CONJUNCTIVITIS OF BOTH EYES: ICD-10-CM

## 2024-05-16 DIAGNOSIS — J45.20 ASTHMA IN ADULT, MILD INTERMITTENT, UNCOMPLICATED: ICD-10-CM

## 2024-05-16 DIAGNOSIS — J31.0 CHRONIC RHINITIS: ICD-10-CM

## 2024-05-16 DIAGNOSIS — I10 BENIGN ESSENTIAL HYPERTENSION: ICD-10-CM

## 2024-05-16 DIAGNOSIS — J32.8 OTHER CHRONIC SINUSITIS: ICD-10-CM

## 2024-05-16 DIAGNOSIS — J31.0 CHRONIC RHINITIS: Primary | ICD-10-CM

## 2024-05-16 LAB
IGA SERPL-MCNC: 119 MG/DL (ref 40–350)
IGG SERPL-MCNC: 919 MG/DL (ref 650–1600)
IGM SERPL-MCNC: 34 MG/DL (ref 50–300)

## 2024-05-16 PROCEDURE — 82784 ASSAY IGA/IGD/IGG/IGM EACH: CPT | Mod: 59 | Performed by: ALLERGY & IMMUNOLOGY

## 2024-05-16 PROCEDURE — 86003 ALLG SPEC IGE CRUDE XTRC EA: CPT | Mod: 59 | Performed by: ALLERGY & IMMUNOLOGY

## 2024-05-16 PROCEDURE — 99215 OFFICE O/P EST HI 40 MIN: CPT | Mod: S$GLB,,, | Performed by: ALLERGY & IMMUNOLOGY

## 2024-05-16 PROCEDURE — 36415 COLL VENOUS BLD VENIPUNCTURE: CPT | Mod: PO | Performed by: ALLERGY & IMMUNOLOGY

## 2024-05-16 PROCEDURE — 86774 TETANUS ANTIBODY: CPT | Performed by: ALLERGY & IMMUNOLOGY

## 2024-05-16 PROCEDURE — 86003 ALLG SPEC IGE CRUDE XTRC EA: CPT | Performed by: ALLERGY & IMMUNOLOGY

## 2024-05-16 PROCEDURE — 82787 IGG 1 2 3 OR 4 EACH: CPT | Mod: 59 | Performed by: ALLERGY & IMMUNOLOGY

## 2024-05-16 PROCEDURE — 82784 ASSAY IGA/IGD/IGG/IGM EACH: CPT | Performed by: ALLERGY & IMMUNOLOGY

## 2024-05-16 PROCEDURE — 82785 ASSAY OF IGE: CPT | Performed by: ALLERGY & IMMUNOLOGY

## 2024-05-16 PROCEDURE — 99999 PR PBB SHADOW E&M-EST. PATIENT-LVL IV: CPT | Mod: PBBFAC,,, | Performed by: ALLERGY & IMMUNOLOGY

## 2024-05-16 NOTE — PROGRESS NOTES
Subjective:       Patient ID: Amber Khan is a 59 y.o. female.    Chief Complaint:  No chief complaint on file.      60 yo woman presents for consult from Dr Ochoa for rhinitis. She has been seen buy Dr García in past with negative immunocaps and negative skin test. She states she is having face pain and pressure, headaches, pain in eyes and eyes even feeling swollen. Lots of pressure. No runny nose or PND, occ sneeze, no itchy nose or eyes. She has been diagnosed with sinusitis and ear infections. Finishing antibiotics but does not seem to help. She is on zyrtec and thinks it does not help and makes her sleepy. Also on montelukast. Just started Dymista 1 SEN daily and been on last week. No asthma or eczema. No food insect or latex allergy.       Environmental History: see history section for home environment  Review of Systems   HENT:  Positive for congestion, ear pain, facial swelling, sinus pressure, sinus pain and sneezing. Negative for nosebleeds, postnasal drip, rhinorrhea and sore throat.    Eyes:  Positive for discharge. Negative for redness and itching.   Respiratory:  Negative for cough, chest tightness, shortness of breath and wheezing.    Skin:  Negative for color change and rash.   Neurological:  Positive for headaches.        Objective:      Physical Exam  Vitals and nursing note reviewed.   Constitutional:       General: She is not in acute distress.     Appearance: Normal appearance. She is not ill-appearing.   HENT:      Nose: No rhinorrhea.   Eyes:      General:         Right eye: No discharge.         Left eye: No discharge.      Conjunctiva/sclera: Conjunctivae normal.   Pulmonary:      Effort: Pulmonary effort is normal. No respiratory distress.   Abdominal:      General: There is no distension.   Skin:     General: Skin is warm and dry.      Findings: No erythema or rash.   Neurological:      Mental Status: She is alert and oriented to person, place, and time.   Psychiatric:         Mood and  Affect: Mood normal.         Behavior: Behavior normal.         Laboratory:   none performed   Assessment:       1. Chronic rhinitis    2. Simple chronic conjunctivitis of both eyes    3. Other chronic sinusitis    4. Deviated nasal septum    5. Hypertrophy of nasal turbinates    6. Asthma in adult, mild intermittent, uncomplicated    7. Benign essential hypertension         Plan:       Advised rhinitis and sinusitis can be allergic vs chronic non allergic vs infections vs structure issue vs HA syndrome. Advised with normal CT scan and negative allergy test in past is possible more HA syndrome. Will send immunocaps and immune testing to eval if any allergic cause, if negative then needs to keep neuro appointment  Increase Dymista to 1 SEN BID and stop zyrtec  Phone review    I spent a total of 40 minutes on the day of the visit.  This includes face to face time and non-face to face time preparing to see the patient (eg, review of tests), obtaining and/or reviewing separately obtained history, documenting clinical information in the electronic or other health record, independently interpreting results and communicating results to the patient/family/caregiver, or care coordinator.

## 2024-05-17 LAB — IGE SERPL-ACNC: <35 IU/ML (ref 0–100)

## 2024-05-20 LAB
IGG1 SER-MCNC: 559 MG/DL (ref 382–929)
IGG2 SER-MCNC: 247 MG/DL (ref 242–700)
IGG3 SER-MCNC: 34 MG/DL (ref 22–176)
IGG4 SER-MCNC: 16 MG/DL (ref 4–86)

## 2024-05-21 LAB
A ALTERNATA IGE QN: <0.1 KU/L
A FUMIGATUS IGE QN: <0.1 KU/L
ALLERGEN CHAETOMIUM GLOBOSUM IGE: <0.1 KU/L
BAHIA GRASS IGE QN: <0.1 KU/L
BALD CYPRESS IGE QN: <0.1 KU/L
BERMUDA GRASS IGE QN: <0.1 KU/L
C HERBARUM IGE QN: <0.1 KU/L
C LUNATA IGE QN: <0.1 KU/L
CAT DANDER IGE QN: <0.1 KU/L
CHAETOMIUM GLOB. CLASS: NORMAL
COMMON RAGWEED IGE QN: <0.1 KU/L
COTTONWOOD IGE QN: <0.1 KU/L
D FARINAE IGE QN: 0.23 KU/L
D PTERONYSS IGE QN: <0.1 KU/L
DEPRECATED A ALTERNATA IGE RAST QL: NORMAL
DEPRECATED A FUMIGATUS IGE RAST QL: NORMAL
DEPRECATED BAHIA GRASS IGE RAST QL: NORMAL
DEPRECATED BALD CYPRESS IGE RAST QL: NORMAL
DEPRECATED BERMUDA GRASS IGE RAST QL: NORMAL
DEPRECATED C HERBARUM IGE RAST QL: NORMAL
DEPRECATED C LUNATA IGE RAST QL: NORMAL
DEPRECATED CAT DANDER IGE RAST QL: NORMAL
DEPRECATED COMMON RAGWEED IGE RAST QL: NORMAL
DEPRECATED COTTONWOOD IGE RAST QL: NORMAL
DEPRECATED D FARINAE IGE RAST QL: ABNORMAL
DEPRECATED D PTERONYSS IGE RAST QL: NORMAL
DEPRECATED DOG DANDER IGE RAST QL: NORMAL
DEPRECATED ELDER IGE RAST QL: NORMAL
DEPRECATED ENGL PLANTAIN IGE RAST QL: NORMAL
DEPRECATED HORSE DANDER IGE RAST QL: NORMAL
DEPRECATED JOHNSON GRASS IGE RAST QL: NORMAL
DEPRECATED LONDON PLANE IGE RAST QL: NORMAL
DEPRECATED MUGWORT IGE RAST QL: NORMAL
DEPRECATED P NOTATUM IGE RAST QL: NORMAL
DEPRECATED PECAN/HICK TREE IGE RAST QL: NORMAL
DEPRECATED ROACH IGE RAST QL: NORMAL
DEPRECATED S ROSTRATA IGE RAST QL: NORMAL
DEPRECATED SALTWORT IGE RAST QL: NORMAL
DEPRECATED SILVER BIRCH IGE RAST QL: NORMAL
DOG DANDER IGE QN: <0.1 KU/L
ELDER IGE QN: <0.1 KU/L
ENGL PLANTAIN IGE QN: <0.1 KU/L
HORSE DANDER IGE QN: <0.1 KU/L
JOHNSON GRASS IGE QN: <0.1 KU/L
LONDON PLANE IGE QN: <0.1 KU/L
MUGWORT IGE QN: <0.1 KU/L
P NOTATUM IGE QN: <0.1 KU/L
PECAN/HICK TREE IGE QN: <0.1 KU/L
ROACH IGE QN: <0.1 KU/L
S ROSTRATA IGE QN: <0.1 KU/L
SALTWORT IGE QN: <0.1 KU/L
SILVER BIRCH IGE QN: <0.1 KU/L

## 2024-05-24 LAB
ALLERGEN WALNUT TREE IGE: <0.1 KU/L
ALLERGEN WHITE PINE TREE IGE: <0.1 KU/L
DEPRECATED TIMOTHY IGE RAST QL: NORMAL
DEPRECATED WEST RAGWEED IGE RAST QL: NORMAL
DEPRECATED WHITE OAK IGE RAST QL: NORMAL
DEPRECATED WILLOW IGE RAST QL: NORMAL
TIMOTHY IGE QN: <0.1 KU/L
WALNUT TREE CLASS: NORMAL
WEST RAGWEED IGE QN: <0.1 KU/L
WHITE OAK IGE QN: <0.1 KU/L
WHITE PINE CLASS: NORMAL
WILLOW IGE QN: <0.1 KU/L

## 2024-05-28 LAB
C DIPHTHERIAE AB SER IA-ACNC: 0.97 IU/ML
C TETANI TOXOID AB SER-ACNC: 0.12 IU/ML
DEPRECATED S PNEUM23 IGG SER-MCNC: 9.4 UG/ML
DEPRECATED S PNEUM4 IGG SER-MCNC: 1.6 UG/ML
HAEM INFLU B IGG SER IA-MCNC: 0.4 MCG/ML
S PN DA SERO 19F IGG SER-MCNC: 3.8 UG/ML
S PNEUM DA 1 IGG SER-MCNC: 6.4 UG/ML
S PNEUM DA 14 IGG SER-MCNC: 32.8
S PNEUM DA 18C IGG SER-MCNC: 1.9
S PNEUM DA 19A IGG SER-MCNC: 2.1 UG/ML
S PNEUM DA 3 IGG SER-MCNC: 1.8 UG/ML
S PNEUM DA 5 IGG SER-MCNC: 2.9 UG/ML
S PNEUM DA 6A IGG SER-MCNC: 1.4 UG/ML
S PNEUM DA 6B IGG SER-MCNC: 6.5 UG/ML
S PNEUM DA 7F IGG SER-MCNC: 4.2 UG/ML
S PNEUM DA 9V IGG SER-MCNC: 2 UG/ML

## 2024-05-30 ENCOUNTER — PATIENT MESSAGE (OUTPATIENT)
Dept: ALLERGY | Facility: CLINIC | Age: 59
End: 2024-05-30
Payer: COMMERCIAL

## 2024-06-06 ENCOUNTER — OFFICE VISIT (OUTPATIENT)
Dept: NEUROLOGY | Facility: CLINIC | Age: 59
End: 2024-06-06
Payer: COMMERCIAL

## 2024-06-06 ENCOUNTER — TELEPHONE (OUTPATIENT)
Dept: NEUROLOGY | Facility: CLINIC | Age: 59
End: 2024-06-06
Payer: COMMERCIAL

## 2024-06-06 VITALS
WEIGHT: 192.44 LBS | HEIGHT: 64 IN | SYSTOLIC BLOOD PRESSURE: 164 MMHG | BODY MASS INDEX: 32.85 KG/M2 | DIASTOLIC BLOOD PRESSURE: 90 MMHG | HEART RATE: 91 BPM

## 2024-06-06 DIAGNOSIS — G43.809 OTHER MIGRAINE WITHOUT STATUS MIGRAINOSUS, NOT INTRACTABLE: Primary | ICD-10-CM

## 2024-06-06 DIAGNOSIS — G44.229 CHRONIC TENSION-TYPE HEADACHE, NOT INTRACTABLE: ICD-10-CM

## 2024-06-06 DIAGNOSIS — R51.9 SINUS HEADACHE: ICD-10-CM

## 2024-06-06 PROCEDURE — 99215 OFFICE O/P EST HI 40 MIN: CPT | Mod: S$GLB,,, | Performed by: STUDENT IN AN ORGANIZED HEALTH CARE EDUCATION/TRAINING PROGRAM

## 2024-06-06 PROCEDURE — 99999 PR PBB SHADOW E&M-EST. PATIENT-LVL IV: CPT | Mod: PBBFAC,,, | Performed by: STUDENT IN AN ORGANIZED HEALTH CARE EDUCATION/TRAINING PROGRAM

## 2024-06-06 RX ORDER — DIVALPROEX SODIUM 250 MG/1
250 TABLET, FILM COATED, EXTENDED RELEASE ORAL DAILY
Qty: 30 TABLET | Refills: 5 | Status: SHIPPED | OUTPATIENT
Start: 2024-06-06 | End: 2024-12-03

## 2024-06-06 NOTE — TELEPHONE ENCOUNTER
----- Message from Alysha Cardoso MA sent at 6/6/2024 11:24 AM CDT -----  Regarding: appointment  Good morning, the patient was seen by Dr. Celestina Lubin on today at the [facility]. The patient prefers to see someone at [facility] or [facility]. Dr. Monique would like for the patient to see Dr. Marie. Can someone reach put to the patient and offer her an appointment? Thanks

## 2024-06-06 NOTE — TELEPHONE ENCOUNTER
----- Message from Soheila Quiroz sent at 6/6/2024 12:06 PM CDT -----  Regarding: missed call  Type:  Patient Returning Call    Who Called:patient  Who Left Message for Patient:Yesenia  Does the patient know what this is regarding?:yes  Would the patient rather a call back or a response via [facility]? call  Best Call Back Number: [phone]  Additional Information:

## 2024-06-06 NOTE — PROGRESS NOTES
Chief Complaint and Duration     Chronic headaches    History of Present Illness     Amber Khan is a 59 y.o. female w hx of htn.     Significant hx of chronic rhinitis. Ear infection.     Saw prior neurologist 6/2023 for mixed headache features - migraine and tension type. Was on nortryptiline 25mg daily which did not help.   Discussed trial of depakote 250mg ER daily and was to return in 2 months.  Patient lost to followup 2/2 to insurance issues.     Significant sinus / allergies at present.    Review of patient's allergies indicates:  No Known Allergies  Current Outpatient Medications   Medication Sig Dispense Refill    amLODIPine (NORVASC) 10 MG tablet       atorvastatin (LIPITOR) 20 MG tablet atorvastatin 20 mg tablet      azelastine-fluticasone (DYMISTA) 137-50 mcg/spray Spry nassal spray 1 spray by Each Nostril route once daily. 23 g 11    dicyclomine (BENTYL) 20 mg tablet dicyclomine 20 mg tablet   Take 1 tablet 4 times a day by oral route for 30 days.      inhalation spacing device (BREATHERITE MDI SPACER) Use as directed for inhalation. 1 each 0    lansoprazole (PREVACID) 30 MG capsule Take 1 capsule (30 mg total) by mouth once daily. 90 capsule 3    levothyroxine (SYNTHROID) 25 MCG tablet Take 25 mcg by mouth once daily.      lisinopril (PRINIVIL,ZESTRIL) 20 MG tablet       lisinopril-hydrochlorothiazide (PRINZIDE,ZESTORETIC) 20-12.5 mg per tablet lisinopril 20 mg-hydrochlorothiazide 12.5 mg tablet   Take 1 tablet(s) every day by oral route.      meclizine (ANTIVERT) 25 mg tablet Take 1 tablet (25 mg total) by mouth 3 (three) times daily as needed for Dizziness. 30 tablet 0    metFORMIN (GLUCOPHAGE) 500 MG tablet Take 500 mg by mouth 2 (two) times daily with meals.      montelukast (SINGULAIR) 10 mg tablet Take 1 tablet (10 mg total) by mouth every evening. 30 tablet 11    predniSONE (DELTASONE) 10 MG tablet Take 1 tablet (10 mg total) by mouth once daily. Take 4 pills PO day 1, then 3 pills PO day  2, then 2 pills PO day 3, then 1 pill PO day 4 10 tablet 0    sucralfate (CARAFATE) 1 gram tablet TAKE 1 TABLET (1 G TOTAL) BY MOUTH 3 (THREE) TIMES DAILY AS NEEDED (ABD SYMPTOMS). 270 tablet 1    VITAMIN D2 50,000 unit capsule       albuterol (VENTOLIN HFA) 90 mcg/actuation inhaler Inhale 2 puffs into the lungs every 4 (four) hours as needed for Wheezing or Shortness of Breath. Rescue 18 g 0    cetirizine (ZYRTEC) 10 MG tablet Take 1 tablet (10 mg total) by mouth once daily. 30 tablet 0    divalproex ER (DEPAKOTE ER) 250 MG 24 hr tablet Take 1 tablet (250 mg total) by mouth once daily. 30 tablet 5    nortriptyline (PAMELOR) 25 MG capsule TAKE 1 CAPSULE BY MOUTH EVERY EVENING. (Patient not taking: Reported on 5/16/2024) 90 capsule 3    olopatadine (PATANOL) 0.1 % ophthalmic solution Place 1 drop into both eyes 2 (two) times daily. (Patient not taking: Reported on 5/16/2024) 5 mL 3     No current facility-administered medications for this visit.       Medical History     Past Medical History:   Diagnosis Date    Allergy     Asthma     Fibromyalgia 2009    Hyperlipidemia     Hypertension     Hypothyroidism      Past Surgical History:   Procedure Laterality Date    COLONOSCOPY N/A 04/14/2023    Procedure: COLONOSCOPY;  Surgeon: Luis Carlos Wilkins MD;  Location: [facility];  Service: Endoscopy;  Laterality: N/A;    HYSTERECTOMY      TUBAL LIGATION      TYMPANOSTOMY TUBE PLACEMENT       No family history on file.  Social History     Socioeconomic History    Marital status:    Tobacco Use    Smoking status: Never     Passive exposure: Never    Smokeless tobacco: Never   Substance and Sexual Activity    Alcohol use: No    Drug use: No    Sexual activity: Not Currently     Partners: Male     Social Determinants of Health     Financial Resource Strain: Low Risk  (6/4/2024)    Overall Financial Resource Strain (CARDIA)     Difficulty of Paying Living Expenses: Not hard at all   Food Insecurity: No Food Insecurity  (6/4/2024)    Hunger Vital Sign     Worried About Running Out of Food in the Last Year: Never true     Ran Out of Food in the Last Year: Never true   Transportation Needs: No Transportation Needs (5/11/2024)    PRAPARE - Transportation     Lack of Transportation (Medical): No     Lack of Transportation (Non-Medical): No   Physical Activity: Inactive (6/4/2024)    Exercise Vital Sign     Days of Exercise per Week: 0 days     Minutes of Exercise per Session: 0 min   Stress: Stress Concern Present (6/4/2024)    [employer] - Occupational Stress Questionnaire     Feeling of Stress : Rather much   Housing Stability: Unknown (6/4/2024)    Housing Stability Vital Sign     Unable to Pay for Housing in the Last Year: No       Exam     Vitals:    06/06/24 0729   BP: (!) 164/90   Pulse: 91      Physical Exam:  General: Not in acute distress. Not ill-appearing.   HENT: Normocephalic and atraumatic. Moist mucous membranes.  Eyes: Conjunctivae normal.   Pulmonary: Pulmonary effort is normal.   Abdominal: Abdomen is soft and flat.   Skin: Skin is warm and dry. No rashes.   Psychiatric: Mood normal.        Neurologic Exam   Mental status: oriented to person, place, and time  Attention: Normal. Concentration: normal.  Speech: speech is normal.  Cranial Nerves: PERRL, EOMI intact, V1-V3 Facial sensation intact. Symmetric facies. Hearing grossly intact. Palate and uvula midline, symmetric. No tongue deviation. Trapezius strength intact.     Motor exam: bulk and tone normal. Strength 5/5 in bilateral upper extremities: deltoids, biceps, triceps, wrist flexion/extension, finger abduction/adduction. Strength 5/5 in bilateral lower extremities: hip flexion/extension, thigh adduction/abduction, knee flexion/extension, dorsiflexion/plantarflexion, foot eversion/inversion.    Reflexes: 2+ in bilateral upper extremities: biceps and brachiaradialis, 2+ in bilateral lower extremities: patellar and achilles  Plantar  reflex: normal  Amaral's/Clonus: negative    Sensory exam: light touch intact    Gait exam: normal  Romberg: negative  Coordination: normal    Tremor: none  Cogwheel rigidity: none    Labs and Imaging     Labs: reviewed  No results found for this or any previous visit (from the past 24 hour(s)).  Does not have DMII    Imaging:   I have personally reviewed the images performed.   6/2023 CT sinuses no acute intracranial processes    Assessment and Plan     Problem List Items Addressed This Visit          Neuro    Chronic tension-type headache, not intractable    Relevant Medications    divalproex ER (DEPAKOTE ER) 250 MG 24 hr tablet    Migraine without status migrainosus, not intractable - Primary    Relevant Medications    divalproex ER (DEPAKOTE ER) 250 MG 24 hr tablet     Other Visit Diagnoses       Sinus headache              58 yo female. Hx of chronic sinusitis/allergies, hx of mixed headaches w tension and migraine component.    Patient is new to me. Was to followup w prior neurologist in 2 months after seeing him in 6/2023, was started on depakote (on nortryptiline 25mg daily which was not helping). Lost to followup 2/2 insurance issues, patient asking if can establish again w neurologist closer to her.    Difficult to assess baseline given significant mixed features w chronic sinus issues, ear infections.   Unclear if patient was taking depakote, discussed w patient to restart it and see.    Significant sinus problems.  Will have patient followup w prior neurologist. Discussed multifactorial component.      Follow-up: followup w Dr Marie as location is more ideal for her    Time spent on this encounter: 45 minutes. This includes face to face time and non-face to face time preparing to see the patient (eg, review of tests), obtaining and/or reviewing separately obtained history, documenting clinical information in the electronic or other health record, independently interpreting results and communicating  results to the patient/family/caregiver, or care coordinator.     This note was created by combination of typed  and M-Modal dictation. Transcription and phonetic errors may be present.  If there are any questions, please contact me.

## 2024-08-12 ENCOUNTER — TELEPHONE (OUTPATIENT)
Dept: OTOLARYNGOLOGY | Facility: CLINIC | Age: 59
End: 2024-08-12
Payer: COMMERCIAL

## 2024-08-12 NOTE — TELEPHONE ENCOUNTER
Returned call. Apt rescheduled to September 26th. Pt thanked me and verbalized understanding.     ----- Message from Mala Ferro sent at 8/12/2024 10:59 AM CDT -----  Regarding: appt  Contact: [phone]  Patient's  ( Israel Khan ) called to cancel and reschedule patients appt for today 08/12 . Please contact patient's   ( Israel ) at [phone]

## 2024-08-22 ENCOUNTER — HOSPITAL ENCOUNTER (OUTPATIENT)
Dept: RADIOLOGY | Facility: HOSPITAL | Age: 59
Discharge: HOME | End: 2024-08-22
Attending: INTERNAL MEDICINE
Payer: COMMERCIAL

## 2024-08-22 ENCOUNTER — OFFICE VISIT (OUTPATIENT)
Dept: RHEUMATOLOGY | Facility: CLINIC | Age: 59
End: 2024-08-22
Payer: COMMERCIAL

## 2024-08-22 VITALS
BODY MASS INDEX: 33.12 KG/M2 | HEART RATE: 93 BPM | HEIGHT: 64 IN | SYSTOLIC BLOOD PRESSURE: 130 MMHG | WEIGHT: 194 LBS | DIASTOLIC BLOOD PRESSURE: 85 MMHG

## 2024-08-22 DIAGNOSIS — M25.50 POLYARTHRALGIA: ICD-10-CM

## 2024-08-22 DIAGNOSIS — M25.50 POLYARTHRALGIA: Primary | ICD-10-CM

## 2024-08-22 PROCEDURE — 99999 PR PBB SHADOW E&M-EST. PATIENT-LVL IV: CPT | Mod: PBBFAC,,, | Performed by: INTERNAL MEDICINE

## 2024-08-22 PROCEDURE — 72202 X-RAY EXAM SI JOINTS 3/> VWS: CPT | Mod: 26,,, | Performed by: RADIOLOGY

## 2024-08-22 PROCEDURE — 99205 OFFICE O/P NEW HI 60 MIN: CPT | Mod: S$GLB,,, | Performed by: INTERNAL MEDICINE

## 2024-08-22 PROCEDURE — 72202 X-RAY EXAM SI JOINTS 3/> VWS: CPT | Mod: TC

## 2024-08-22 PROCEDURE — 73521 X-RAY EXAM HIPS BI 2 VIEWS: CPT | Mod: 26,,, | Performed by: RADIOLOGY

## 2024-08-22 PROCEDURE — 73521 X-RAY EXAM HIPS BI 2 VIEWS: CPT | Mod: TC

## 2024-08-22 RX ORDER — DICLOFENAC SODIUM 75 MG/1
75 TABLET, DELAYED RELEASE ORAL 2 TIMES DAILY
Qty: 60 TABLET | Refills: 0 | Status: SHIPPED | OUTPATIENT
Start: 2024-08-22 | End: 2024-09-21

## 2024-08-22 RX ORDER — CYCLOBENZAPRINE HCL 10 MG
10 TABLET ORAL NIGHTLY PRN
Qty: 30 TABLET | Refills: 4 | Status: SHIPPED | OUTPATIENT
Start: 2024-08-22 | End: 2024-09-21

## 2024-08-22 NOTE — PROGRESS NOTES
8/18/2024     9:19 PM   Rapid3 Question Responses and Scores   MDHAQ Score 0.5   Psychologic Score 3.3   Pain Score 7   When you awakened in the morning OVER THE LAST WEEK, did you feel stiff? No   Fatigue Score 7   Global Health Score 7   RAPID3 Score 5.22     Answers submitted by the patient for this visit:  Rheumatology Questionnaire (Submitted on 8/18/2024)  fever: No  eye redness: No  mouth sores: No  headaches: Yes  shortness of breath: No  chest pain: No  trouble swallowing: No  diarrhea: No  constipation: No  unexpected weight change: No  genital sore: No  dysuria: No  During the last 3 days, have you had a skin rash?: No  Bruises or bleeds easily: No  cough: No

## 2024-08-22 NOTE — PROGRESS NOTES
"Subjective:      Patient ID: Amber Khan is a 59 y.o. female.    Chief Complaint: No chief complaint on file.    HPI 59 year old M  with PMH of asthma, HL, HTN, hypothyroidism, FM here for evaluation. Reports she has been having pain for at least 15 years.   She was diagnosed with FM before [facility].  She has pain in hands, wrists, neck, shoulders, elbows, hips, knees, ankles and feet.  She has pain all over her muscles.  She has been on Atorvastatin for several years.She gets swelling in hands, feet, and ankles for years.  Reports very stiff in morning for about an hour.   She reports being on gabapentin and flexeril. She was also on cymbalta.  Denies any rashes, oral ulcers, or photosensitivity.  Reports hair loss for 3 months. She took course of prednisone and it did not make a difference in pain.      Family hx: dad and mom- "arthritis"        Past Medical History:   Diagnosis Date    Allergy     Asthma     Fibromyalgia 2009    Hyperlipidemia     Hypertension     Hypothyroidism        Review of Systems   Constitutional:  Negative for fever and unexpected weight change.   HENT:  Negative for mouth sores and trouble swallowing.    Eyes:  Negative for redness.   Respiratory:  Negative for cough and shortness of breath.    Cardiovascular:  Negative for chest pain.   Gastrointestinal:  Negative for constipation and diarrhea.   Genitourinary:  Negative for dysuria and genital sores.   Skin:  Negative for rash.   Neurological:  Positive for headaches.   Hematological:  Does not bruise/bleed easily.    see HPI      Objective:   /85   Pulse 93   Ht 5' 4" (1.626 m)   Wt 88 kg (194 lb 0.1 oz)   BMI 33.30 kg/m²   Physical Exam   Constitutional: She is oriented to person, place, and time.   HENT:   Head: Normocephalic and atraumatic.   Right Ear: External ear normal.   Left Ear: External ear normal.   Nose: Nose normal.   Mouth/Throat: Oropharynx is clear and moist. No oropharyngeal exudate.   Eyes: Pupils are " equal, round, and reactive to light. Conjunctivae are normal. Right eye exhibits no discharge. Left eye exhibits no discharge. No scleral icterus.   Neck: No JVD present. No thyromegaly present.   Cardiovascular: Normal rate, regular rhythm and normal heart sounds. Exam reveals no gallop and no friction rub.   No murmur heard.  Pulmonary/Chest: Effort normal and breath sounds normal. No respiratory distress. She has no wheezes. She has no rales. She exhibits no tenderness.   Abdominal: Soft. Bowel sounds are normal. She exhibits no distension and no mass. There is no abdominal tenderness. There is no rebound and no guarding.   Musculoskeletal:         General: Tenderness present. No swelling.      Cervical back: Neck supple.   Lymphadenopathy:     She has no cervical adenopathy.   Neurological: She is alert and oriented to person, place, and time. No cranial nerve deficit. Gait normal. Coordination normal.   Skin: Skin is dry. No rash noted. No erythema. No pallor.   Psychiatric: Affect and judgment normal.       No data to display     Assessment:   59 year old M  with PMH of asthma, HL, HTN, hypothyroidism, FM here for evaluation.    # Joint pain: hard to assess synovitis but she is tender concerning for synovitis.  Labs  Xrays  Consider MRI of hands and Right foot if work up is negative      #Obesity: risks of obesity discussed including increased inflammation.  -discussed diet including anti-inflammatory diet and handout was given  -encouraged daily exercise 30 minutes a day       #FM: she has diffuse body pain consistent with DM.  Start flexeril 10mg po qhs  Start diclofenac 75 mg po BID for 7 days then prn    No diagnosis found.      Plan:     Problem List Items Addressed This Visit    None    60 * minutes of total time spent on the encounter, which includes face to face time and non-face to face time preparing to see the patient (eg, review of tests), Obtaining and/or reviewing separately obtained history,  Documenting clinical information in the electronic or other health record, Independently interpreting results (not separately reported) and communicating results to the patient/family/caregiver, or Care coordination (not separately reported).

## 2024-08-23 DIAGNOSIS — M79.89 BILATERAL HAND SWELLING: ICD-10-CM

## 2024-08-23 DIAGNOSIS — M79.642 BILATERAL HAND PAIN: Primary | ICD-10-CM

## 2024-08-23 DIAGNOSIS — M79.641 BILATERAL HAND PAIN: Primary | ICD-10-CM

## 2024-09-19 RX ORDER — DICLOFENAC SODIUM 75 MG/1
75 TABLET, DELAYED RELEASE ORAL 2 TIMES DAILY
Qty: 60 TABLET | Refills: 8 | Status: SHIPPED | OUTPATIENT
Start: 2024-09-19

## 2024-09-26 ENCOUNTER — OFFICE VISIT (OUTPATIENT)
Dept: OTOLARYNGOLOGY | Facility: CLINIC | Age: 59
End: 2024-09-26
Payer: COMMERCIAL

## 2024-09-26 VITALS
HEART RATE: 92 BPM | WEIGHT: 191.81 LBS | HEIGHT: 64 IN | BODY MASS INDEX: 32.74 KG/M2 | SYSTOLIC BLOOD PRESSURE: 123 MMHG | DIASTOLIC BLOOD PRESSURE: 81 MMHG

## 2024-09-26 DIAGNOSIS — J31.0 CHRONIC RHINITIS: Chronic | ICD-10-CM

## 2024-09-26 DIAGNOSIS — G89.29 CHRONIC NONINTRACTABLE HEADACHE, UNSPECIFIED HEADACHE TYPE: Primary | Chronic | ICD-10-CM

## 2024-09-26 DIAGNOSIS — H04.129 DRY EYE: Chronic | ICD-10-CM

## 2024-09-26 DIAGNOSIS — R51.9 CHRONIC NONINTRACTABLE HEADACHE, UNSPECIFIED HEADACHE TYPE: Primary | Chronic | ICD-10-CM

## 2024-09-26 PROCEDURE — 99214 OFFICE O/P EST MOD 30 MIN: CPT | Mod: S$GLB,,, | Performed by: OTOLARYNGOLOGY

## 2024-09-26 PROCEDURE — 99999 PR PBB SHADOW E&M-EST. PATIENT-LVL IV: CPT | Mod: PBBFAC,,, | Performed by: OTOLARYNGOLOGY

## 2024-09-26 NOTE — PATIENT INSTRUCTIONS
Stop dymista.    Only saline nasal spray for now to see if the eye drying effects.      Continue montelukast for now.      She will let me know how the eye symptoms are progressing without any nasal spray being used.     She will follow up with neurology for HA syndrome- I feel strongly that many of her symptoms are headache related, as the allergy and sinus workup have been negative.

## 2024-09-26 NOTE — PROGRESS NOTES
"  Chief Complaint   Patient presents with    Sinus Problem     Pt stated she coughs at night, headaches         HPI: Patient is a 59-year-old female with a history of ongoing eye irritation/allergic conjunctivitis, chronic rhinitis, status post balloon sinuplasty with Dr. Ramírez, chronic headaches.  Allergy symptoms are perennial but she does have seasonal exacerbations, with wintertime being the worst for her symptoms.  Her symptoms consist of nasal congestion, postnasal drip, headache, severe eye irritation.  She is previously seen Dr. García for allergies and had workup showing mild sensitivity to dust mites.  Prior skin testing many years ago was negative.  She continues to take Flonase b.i.d. and Zyrtec daily.  She complains of significant eye symptoms including itching and irritation and has been seen by an ophthalmologist.    Dr. García referred the patient to Neurology for her headaches and she was seen once and prescribed medication for migraines.  She has not had any follow-up.    Patient has also had previous PE tube insertion for Eustachian tube dysfunction.  She reports having "ear infections" recurrently over the last few months, but upon further questioning it appears the symptoms mostly have to do with ear pain and pressure when she has her allergy flare ups.          Interval HPI 09/26/2024 :      The patient has been using nasal spray and montelukast daily.   She saw allergy and neurology since last visit.   She is on treatment for chronic migraines.    Also saw ophtho because of chronic eye irritation, which has been worse over last few months. She is on meds for dry eye and inflammation in the eye.     Allergy tesitng negative.     She does not report symptoms of rhinorrhea or PND, but she has sinus pressure, headache, eye irritation.            Past Medical History:   Diagnosis Date    Allergy     Asthma     Fibromyalgia 2009    Hyperlipidemia     Hypertension     Hypothyroidism      Social History "     Socioeconomic History    Marital status:    Tobacco Use    Smoking status: Never     Passive exposure: Never    Smokeless tobacco: Never   Substance and Sexual Activity    Alcohol use: No    Drug use: No    Sexual activity: Not Currently     Partners: Male     Social Determinants of Health     Financial Resource Strain: Low Risk  (6/4/2024)    Overall Financial Resource Strain (CARDIA)     Difficulty of Paying Living Expenses: Not hard at all   Food Insecurity: No Food Insecurity (6/4/2024)    Hunger Vital Sign     Worried About Running Out of Food in the Last Year: Never true     Ran Out of Food in the Last Year: Never true   Transportation Needs: No Transportation Needs (5/11/2024)    PRAPARE - Transportation     Lack of Transportation (Medical): No     Lack of Transportation (Non-Medical): No   Physical Activity: Inactive (6/4/2024)    Exercise Vital Sign     Days of Exercise per Week: 0 days     Minutes of Exercise per Session: 0 min   Stress: Stress Concern Present (6/4/2024)    [employer] - Occupational Stress Questionnaire     Feeling of Stress : Rather much   Housing Stability: Unknown (6/4/2024)    Housing Stability Vital Sign     Unable to Pay for Housing in the Last Year: No     Past Surgical History:   Procedure Laterality Date    COLONOSCOPY N/A 04/14/2023    Procedure: COLONOSCOPY;  Surgeon: Luis Carlos Wilkins MD;  Location: [facility];  Service: Endoscopy;  Laterality: N/A;    HYSTERECTOMY      TUBAL LIGATION      TYMPANOSTOMY TUBE PLACEMENT       No family history on file.        Review of Systems  General: negative for chills, fever or weight loss  Psychological: negative for mood changes or depression  Ophthalmic: negative for blurry vision, photophobia or eye pain  ENT: see HPI  Respiratory: no cough, shortness of breath, or wheezing  Cardiovascular: no chest pain or dyspnea on exertion  Gastrointestinal: no abdominal pain, change in bowel habits, or black/  bloody stools  Musculoskeletal: negative for gait disturbance or muscular weakness  Neurological: no syncope or seizures; no ataxia  Dermatological: negative for pruritis,  rash and jaundice  Hematologic/lymphatic: no easy bruising, no new adenopathy      Physical Exam:    Vitals:    09/26/24 1532   BP: 123/81   Pulse: 92           Constitutional:   She is oriented to person, place, and time. Vital signs are normal. She appears well-developed and well-nourished. She appears alert. She is cooperative.   Obese Normal speech.      Head:  Normocephalic and atraumatic. Salivary glands normal.  Facial strength is normal.      Ears:  Hearing normal to normal and whispered voice; external ear normal without scars, lesions, or masses; ear canal, tympanic membrane, and middle ear normal., right ear hearing normal to normal and whispered voice; external ear normal without scars, lesions, or masses; ear canal, tympanic membrane, and middle ear normal. and left ear hearing normal to normal and whispered voice; external ear normal without scars, lesions, or masses; ear canal, tympanic membrane, and middle ear normal..   Right Ear: No middle ear effusion.   Left Ear:  No middle ear effusion.     Nose:  Mucosal edema (improved) and septal deviation (slight deviation to right) present. No rhinorrhea or polyps. Turbinate hypertrophy (2+, mild congestion).  Turbinates normal.  Right sinus exhibits no maxillary sinus tenderness and no frontal sinus tenderness. Left sinus exhibits no maxillary sinus tenderness and no frontal sinus tenderness.     Mouth/Throat  Oropharynx clear and moist without lesions or asymmetry, normal uvula midline, lips, teeth, and gums normal and oropharynx normal. No mucous membrane lesions. No oropharyngeal exudate, posterior oropharyngeal edema or posterior oropharyngeal erythema. Mirror exam not performed due to patient tolerance.  Mirror exam not performed due to patient tolerance.    Mallampati class 3       Neck:  Neck normal without thyromegaly masses, asymmetry, normal tracheal structure, crepitus, and tenderness, thyroid normal, trachea normal, phonation normal, full range of motion with neck supple and no adenopathy. No JVD present. Carotid bruit is not present. No thyroid mass and no thyromegaly present.     She has no cervical adenopathy.     Cardiovascular:    Normal rate, regular rhythm and rate and rhythm, heart sounds, and pulses normal.              Pulmonary/Chest:   Effort and breath sounds normal.     Psychiatric:   She has a normal mood and affect. Her speech is normal and behavior is normal.     Neurological:   She is alert and oriented to person, place, and time. She has neurological normal, alert and oriented. No cranial nerve deficit.     Skin:   No abrasions, lacerations, lesions, or rashes.     CT SINUSES WITHOUT CONTRAST- reviewed images and findings with patient.  Exam from 2023     CLINICAL HISTORY:  Sinusitis, chronic or recurrent;  Chronic sinusitis, unspecified     TECHNIQUE:  Axial CT images performed through the head/paranasal sinuses.  Axial, sagittal and coronal reconstructions performed.  Contrast was not administered.     COMPARISON:  None.     FINDINGS:  The paranasal sinuses are presently essentially clear.  No air-fluid levels or aerated secretions to specifically indicate acute sinusitis.  No osseous thickening or sclerosis.  Mild patchy opacification right mastoid air cells.     Mid nasal septum mildly deviated towards the right.  Ostiomeatal units are patent.  Nasal cavity is unremarkable.     Limited intracranial evaluation is unremarkable.  Extracranial soft tissue structures appear within normal limits.     Impression:     No CT evidence of acute or chronic sinusitis.     Right deviation of the mid nasal septum.       Audiogram: Interpreted by me and reviewed with the patient today.  Mild high-frequency sensorineural loss only at 8000 hertz, tympanograms type a  "bilaterally.        Assessment:    ICD-10-CM ICD-9-CM    1. Chronic nonintractable headache, unspecified headache type  R51.9 784.0     G89.29        2. Chronic rhinitis  J31.0 472.0       3. Dry eye  H04.129 375.15           The primary encounter diagnosis was Chronic nonintractable headache, unspecified headache type. Diagnoses of Chronic rhinitis and Dry eye were also pertinent to this visit.      Plan:    Given the lack of sensitivities on allergy testing and her dry eye symptoms since being on Dymista, we will stop the nasal spray.     She will continue her eye drops, and she will use nasal saline spray or irrigations for nasal symptoms.     I feel her nasal symptoms are more likely migraine symptoms, and I will include a handout on "sinus headaches" for her to review.     She will follow up in a few months to see how she is doing off the nasal sprays.     Continue montelukast for now.      Lauryn Ochoa MD          "

## 2024-09-27 ENCOUNTER — PATIENT MESSAGE (OUTPATIENT)
Dept: RHEUMATOLOGY | Facility: CLINIC | Age: 59
End: 2024-09-27
Payer: COMMERCIAL

## 2024-09-27 DIAGNOSIS — M79.671 RIGHT FOOT PAIN: Primary | ICD-10-CM

## 2024-09-27 DIAGNOSIS — M79.89 SWELLING OF RIGHT FOOT: ICD-10-CM

## 2024-10-04 ENCOUNTER — PATIENT MESSAGE (OUTPATIENT)
Dept: RHEUMATOLOGY | Facility: CLINIC | Age: 59
End: 2024-10-04
Payer: COMMERCIAL

## 2024-12-27 ENCOUNTER — TELEPHONE (OUTPATIENT)
Dept: RHEUMATOLOGY | Facility: CLINIC | Age: 59
End: 2024-12-27
Payer: COMMERCIAL

## 2024-12-27 DIAGNOSIS — M25.50 POLYARTHRALGIA: Primary | ICD-10-CM

## 2024-12-27 NOTE — TELEPHONE ENCOUNTER
Called to speak with patient by phone. Patient did not answer. Inform patient to call back on voice mail.

## 2025-01-02 ENCOUNTER — TELEPHONE (OUTPATIENT)
Dept: RHEUMATOLOGY | Facility: CLINIC | Age: 60
End: 2025-01-02
Payer: COMMERCIAL

## 2025-01-02 NOTE — TELEPHONE ENCOUNTER
Left voicemail asking patient to contact the office back.      ----- Message from Med Assistant Hobson sent at 12/27/2024 12:39 PM CST -----  Called patient she did not answer I left a voicemail for to reach out.  ----- Message -----  From: Chantell Zamora MD  Sent: 12/27/2024  12:02 PM CST  To: Noah Abdul Staff    Please let her know that her insurance will not pay for MRIs without xrays.  Please ask her to do the xrays.  Please call her since she's not checking her messages.

## 2025-01-30 ENCOUNTER — OFFICE VISIT (OUTPATIENT)
Dept: OTOLARYNGOLOGY | Facility: CLINIC | Age: 60
End: 2025-01-30
Payer: COMMERCIAL

## 2025-01-30 VITALS
SYSTOLIC BLOOD PRESSURE: 135 MMHG | HEART RATE: 93 BPM | WEIGHT: 194.44 LBS | BODY MASS INDEX: 33.38 KG/M2 | DIASTOLIC BLOOD PRESSURE: 77 MMHG

## 2025-01-30 DIAGNOSIS — J30.9 ALLERGIC RHINITIS, UNSPECIFIED SEASONALITY, UNSPECIFIED TRIGGER: Primary | Chronic | ICD-10-CM

## 2025-01-30 DIAGNOSIS — R05.9 COUGH, UNSPECIFIED TYPE: ICD-10-CM

## 2025-01-30 DIAGNOSIS — H10.13 ALLERGIC CONJUNCTIVITIS OF BOTH EYES: Chronic | ICD-10-CM

## 2025-01-30 PROCEDURE — 99214 OFFICE O/P EST MOD 30 MIN: CPT | Mod: S$GLB,,, | Performed by: OTOLARYNGOLOGY

## 2025-01-30 PROCEDURE — 99999 PR PBB SHADOW E&M-EST. PATIENT-LVL IV: CPT | Mod: PBBFAC,,, | Performed by: OTOLARYNGOLOGY

## 2025-01-30 RX ORDER — METHYLPREDNISOLONE 4 MG/1
TABLET ORAL
Qty: 1 EACH | Refills: 0 | Status: SHIPPED | OUTPATIENT
Start: 2025-01-30

## 2025-01-30 RX ORDER — CETIRIZINE HYDROCHLORIDE 10 MG/1
10 TABLET ORAL DAILY
Qty: 30 TABLET | Refills: 0 | Status: SHIPPED | OUTPATIENT
Start: 2025-01-30 | End: 2025-03-01

## 2025-01-30 RX ORDER — OLOPATADINE HYDROCHLORIDE 1 MG/ML
1 SOLUTION/ DROPS OPHTHALMIC 2 TIMES DAILY PRN
Qty: 5 ML | Refills: 3 | Status: SHIPPED | OUTPATIENT
Start: 2025-01-30 | End: 2026-01-30

## 2025-01-30 RX ORDER — BENZONATATE 200 MG/1
200 CAPSULE ORAL 3 TIMES DAILY PRN
Qty: 30 CAPSULE | Refills: 1 | Status: SHIPPED | OUTPATIENT
Start: 2025-01-30 | End: 2025-02-19

## 2025-01-30 NOTE — PROGRESS NOTES
"  Chief Complaint   Patient presents with    Follow-up     Some midfacial/nasal congestion        HPI: Patient is a 59-year-old female with a history of ongoing eye irritation/allergic conjunctivitis, chronic rhinitis, status post balloon sinuplasty with Dr. Ramírez, chronic headaches.  Allergy symptoms are perennial but she does have seasonal exacerbations, with wintertime being the worst for her symptoms.  Her symptoms consist of nasal congestion, postnasal drip, headache, severe eye irritation.  She is previously seen Dr. García for allergies and had workup showing mild sensitivity to dust mites.  Prior skin testing many years ago was negative.  She continues to take Flonase b.i.d. and Zyrtec daily.  She complains of significant eye symptoms including itching and irritation and has been seen by an ophthalmologist.    Dr. García referred the patient to Neurology for her headaches and she was seen once and prescribed medication for migraines.  She has not had any follow-up.    Patient has also had previous PE tube insertion for Eustachian tube dysfunction.  She reports having "ear infections" recurrently over the last few months, but upon further questioning it appears the symptoms mostly have to do with ear pain and pressure when she has her allergy flare ups.          Interval HPI 09/26/2024 :      The patient has been using nasal spray and montelukast daily.   She saw allergy and neurology since last visit.   She is on treatment for chronic migraines.    Also saw ophtho because of chronic eye irritation, which has been worse over last few months. She is on meds for dry eye and inflammation in the eye.     Allergy tesitng negative.     She does not report symptoms of rhinorrhea or PND, but she has sinus pressure, headache, eye irritation.        Interval HPI 01/30/2025 :      Patient reports she has had flare-up of her allergies over the last few days/couple weeks.  Her eyes are particularly irritated and itchy.  She " has continued to use her nasal sprays and montelukast daily, but she has not continued Zyrtec daily.  She does have dry eye and has eyedrops to use for that, and she has been using these, but they are not helping the symptoms.      Past Medical History:   Diagnosis Date    Allergy     Asthma     Fibromyalgia 2009    Hyperlipidemia     Hypertension     Hypothyroidism      Social History     Socioeconomic History    Marital status:    Tobacco Use    Smoking status: Never     Passive exposure: Never    Smokeless tobacco: Never   Substance and Sexual Activity    Alcohol use: No    Drug use: No    Sexual activity: Not Currently     Partners: Male     Social Drivers of Health     Financial Resource Strain: Low Risk  (6/4/2024)    Overall Financial Resource Strain (CARDIA)     Difficulty of Paying Living Expenses: Not hard at all   Food Insecurity: No Food Insecurity (6/4/2024)    Hunger Vital Sign     Worried About Running Out of Food in the Last Year: Never true     Ran Out of Food in the Last Year: Never true   Transportation Needs: No Transportation Needs (5/11/2024)    PRAPARE - Transportation     Lack of Transportation (Medical): No     Lack of Transportation (Non-Medical): No   Physical Activity: Inactive (6/4/2024)    Exercise Vital Sign     Days of Exercise per Week: 0 days     Minutes of Exercise per Session: 0 min   Stress: Stress Concern Present (6/4/2024)    [employer] - Occupational Stress Questionnaire     Feeling of Stress : Rather much   Housing Stability: Unknown (6/4/2024)    Housing Stability Vital Sign     Unable to Pay for Housing in the Last Year: No     Past Surgical History:   Procedure Laterality Date    COLONOSCOPY N/A 04/14/2023    Procedure: COLONOSCOPY;  Surgeon: Luis Carlos Wilkins MD;  Location: [facility];  Service: Endoscopy;  Laterality: N/A;    HYSTERECTOMY      TUBAL LIGATION      TYMPANOSTOMY TUBE PLACEMENT       No family history on file.        Review  of Systems  General: negative for chills, fever or weight loss  Psychological: negative for mood changes or depression  Ophthalmic: negative for blurry vision, photophobia or eye pain  ENT: see HPI  Respiratory: no cough, shortness of breath, or wheezing  Cardiovascular: no chest pain or dyspnea on exertion  Gastrointestinal: no abdominal pain, change in bowel habits, or black/ bloody stools  Musculoskeletal: negative for gait disturbance or muscular weakness  Neurological: no syncope or seizures; no ataxia  Dermatological: negative for pruritis,  rash and jaundice  Hematologic/lymphatic: no easy bruising, no new adenopathy      Physical Exam:    Vitals:    01/30/25 1124   BP: 135/77   Pulse: 93           Constitutional:   She is oriented to person, place, and time. Vital signs are normal. She appears well-developed and well-nourished. She appears alert. She is cooperative. Normal speech.      Head:  Normocephalic and atraumatic. Salivary glands normal.  Facial strength is normal.    Injected conjunctiva bilaterally     Ears:  Hearing normal to normal and whispered voice; external ear normal without scars, lesions, or masses; ear canal, tympanic membrane, and middle ear normal., right ear hearing normal to normal and whispered voice; external ear normal without scars, lesions, or masses; ear canal, tympanic membrane, and middle ear normal. and left ear hearing normal to normal and whispered voice; external ear normal without scars, lesions, or masses; ear canal, tympanic membrane, and middle ear normal..   Right Ear: No middle ear effusion.   Left Ear:  No middle ear effusion.     Nose:  Mucosal edema (significant congestion and erythema of nasal mucosa bilaterally), rhinorrhea and septal deviation (slight deviation to right) present. No polyps. Turbinate hypertrophy (2+, mild congestion).  Turbinates normal.  Right sinus exhibits no maxillary sinus tenderness and no frontal sinus tenderness. Left sinus exhibits no  maxillary sinus tenderness and no frontal sinus tenderness.     Mouth/Throat  Oropharynx clear and moist without lesions or asymmetry, normal uvula midline, lips, teeth, and gums normal and oropharynx normal. No mucous membrane lesions. No oropharyngeal exudate, posterior oropharyngeal edema or posterior oropharyngeal erythema. Mirror exam not performed due to patient tolerance.  Mirror exam not performed due to patient tolerance.      Neck:  Neck normal without thyromegaly masses, asymmetry, normal tracheal structure, crepitus, and tenderness, thyroid normal, trachea normal, phonation normal, full range of motion with neck supple and no adenopathy. No JVD present. Carotid bruit is not present. No thyroid mass and no thyromegaly present.     She has no cervical adenopathy.     Cardiovascular:    Normal rate, regular rhythm and rate and rhythm, heart sounds, and pulses normal.              Pulmonary/Chest:   Effort and breath sounds normal.     Psychiatric:   She has a normal mood and affect. Her speech is normal and behavior is normal.     Neurological:   She is alert and oriented to person, place, and time. She has neurological normal, alert and oriented. No cranial nerve deficit.     Skin:   No abrasions, lacerations, lesions, or rashes.     CT SINUSES WITHOUT CONTRAST- reviewed images and findings with patient.  Exam from 2023     CLINICAL HISTORY:  Sinusitis, chronic or recurrent;  Chronic sinusitis, unspecified     TECHNIQUE:  Axial CT images performed through the head/paranasal sinuses.  Axial, sagittal and coronal reconstructions performed.  Contrast was not administered.     COMPARISON:  None.     FINDINGS:  The paranasal sinuses are presently essentially clear.  No air-fluid levels or aerated secretions to specifically indicate acute sinusitis.  No osseous thickening or sclerosis.  Mild patchy opacification right mastoid air cells.     Mid nasal septum mildly deviated towards the right.  Ostiomeatal units  are patent.  Nasal cavity is unremarkable.     Limited intracranial evaluation is unremarkable.  Extracranial soft tissue structures appear within normal limits.     Impression:     No CT evidence of acute or chronic sinusitis.     Right deviation of the mid nasal septum.       Previous Audiogram:   Mild high-frequency sensorineural loss only at 8000 hertz, tympanograms type a bilaterally.        Assessment:    ICD-10-CM ICD-9-CM    1. Allergic rhinitis, unspecified seasonality, unspecified trigger  J30.9 477.9 cetirizine (ZYRTEC) 10 MG tablet      olopatadine (PATANOL) 0.1 % ophthalmic solution      methylPREDNISolone (MEDROL DOSEPACK) 4 mg tablet      2. Cough, unspecified type  R05.9 786.2 benzonatate (TESSALON) 200 MG capsule      3. Allergic conjunctivitis of both eyes  H10.13 372.14             The primary encounter diagnosis was Allergic rhinitis, unspecified seasonality, unspecified trigger. Diagnoses of Cough, unspecified type and Allergic conjunctivitis of both eyes were also pertinent to this visit.      Plan:    Continue to use drops for dry eye.  Use olopatadine eyedrops p.r.n..    Restart Zyrtec daily.  Use for a few weeks and may wean once symptoms under better control.    Start steroid taper to help with acute flare up/exacerbation of allergies.  Continue montelukast and nasal spray daily.      Lauryn Ochoa MD

## 2025-01-30 NOTE — PATIENT INSTRUCTIONS
Start zyrtec daily and continue singulair.   Continue nasal spray daily.     Use allergy eye drops daily as needed.   Continue xiidra for dry eye and eye lubrication as needed.     Start medrol dosepack for allergy flare up.     Notify me if symptoms are not improving.

## 2025-02-07 ENCOUNTER — OFFICE VISIT (OUTPATIENT)
Dept: URGENT CARE | Facility: CLINIC | Age: 60
End: 2025-02-07
Payer: COMMERCIAL

## 2025-02-07 VITALS
SYSTOLIC BLOOD PRESSURE: 132 MMHG | OXYGEN SATURATION: 98 % | WEIGHT: 194.44 LBS | BODY MASS INDEX: 33.2 KG/M2 | RESPIRATION RATE: 17 BRPM | DIASTOLIC BLOOD PRESSURE: 87 MMHG | HEIGHT: 64 IN | TEMPERATURE: 99 F | HEART RATE: 88 BPM

## 2025-02-07 DIAGNOSIS — J10.1 INFLUENZA A: Primary | ICD-10-CM

## 2025-02-07 DIAGNOSIS — R05.9 COUGH, UNSPECIFIED TYPE: ICD-10-CM

## 2025-02-07 LAB
CTP QC/QA: YES
CTP QC/QA: YES
POC MOLECULAR INFLUENZA A AGN: POSITIVE
POC MOLECULAR INFLUENZA B AGN: NEGATIVE
SARS CORONAVIRUS 2 ANTIGEN: NEGATIVE

## 2025-02-07 PROCEDURE — 87811 SARS-COV-2 COVID19 W/OPTIC: CPT | Mod: QW,S$GLB,, | Performed by: NURSE PRACTITIONER

## 2025-02-07 PROCEDURE — 87502 INFLUENZA DNA AMP PROBE: CPT | Mod: QW,S$GLB,, | Performed by: NURSE PRACTITIONER

## 2025-02-07 PROCEDURE — 99214 OFFICE O/P EST MOD 30 MIN: CPT | Mod: S$GLB,,, | Performed by: NURSE PRACTITIONER

## 2025-02-07 RX ORDER — OSELTAMIVIR PHOSPHATE 75 MG/1
75 CAPSULE ORAL 2 TIMES DAILY
Qty: 10 CAPSULE | Refills: 0 | Status: SHIPPED | OUTPATIENT
Start: 2025-02-07 | End: 2025-02-12

## 2025-02-07 RX ORDER — PROMETHAZINE HYDROCHLORIDE AND DEXTROMETHORPHAN HYDROBROMIDE 6.25; 15 MG/5ML; MG/5ML
5 SYRUP ORAL EVERY 8 HOURS PRN
Qty: 180 ML | Refills: 0 | Status: SHIPPED | OUTPATIENT
Start: 2025-02-07

## 2025-02-07 NOTE — PATIENT INSTRUCTIONS
- You must understand that you have received an Urgent Care treatment only and that you may be released before all of your medical problems are known or treated.   - You, the patient, will arrange for follow up care as instructed.   - If your condition worsens or fails to improve we recommend that you receive another evaluation at the ER immediately or contact your PCP to discuss your concerns.   - You can call [phone] or [phone] to help schedule an appointment with the appropriate provider.    Drink plenty of fluids   Get lots of rest  Tylenol or ibuprofen for pain/fever  Mucinex DM for daytime cough  Prescription cough syrup for night time cough. This medication will make you drowsy. Do not drink alcohol or  Operate machinery while taking this medicine.   Saline nasal rinses to irrigate sinus cavities  Warm salt water gargles for sore throat

## 2025-02-07 NOTE — PROGRESS NOTES
"Subjective:      Patient ID: Amber Khan is a 59 y.o. female.    Vitals:  height is 5' 4" (1.626 m) and weight is 88.2 kg (194 lb 7.1 oz). Her oral temperature is 99.4 °F (37.4 °C). Her blood pressure is 132/87 and her pulse is 88. Her respiration is 17 and oxygen saturation is 98%.     Chief Complaint: Sinus Problem    Pt is a 58 yo female w/ c/o sinus issues (facial pain and pressure) including headaches, wheezing, and cough. Sx began 10 days ago. Pt went to allergy doc and was rx a medrol dose pack, and finished it without relief and worsening of symptoms 3 days ago. Pt reports subjective wheezing at night. Pt mentions that she has been exposed to strep and flu by grandson who was recently hospitalized from [facility]. Tx w/ coricidin without relief.     Sinus Problem  This is a new problem. The problem has been rapidly worsening since onset. Associated symptoms include congestion, coughing, headaches, sinus pressure and sneezing. Pertinent negatives include no shortness of breath. Treatments tried: oral steroids. The treatment provided no relief.     HENT:  Positive for congestion and sinus pressure.    Respiratory:  Positive for cough. Negative for shortness of breath.    Allergic/Immunologic: Positive for sneezing.   Neurological:  Positive for headaches.      Objective:     Physical Exam   Constitutional: She is oriented to person, place, and time. She appears well-developed. She is cooperative.  Non-toxic appearance. She does not appear ill. No distress.   HENT:   Head: Normocephalic and atraumatic.   Ears:   Right Ear: Hearing, tympanic membrane, external ear and ear canal normal.   Left Ear: Hearing, tympanic membrane, external ear and ear canal normal.   Nose: No mucosal edema, rhinorrhea or nasal deformity. No epistaxis. Right sinus exhibits maxillary sinus tenderness and frontal sinus tenderness. Left sinus exhibits maxillary sinus tenderness and frontal sinus tenderness.   Mouth/Throat: Uvula is midline, " oropharynx is clear and moist and mucous membranes are normal. No trismus in the jaw. Normal dentition. No uvula swelling. No oropharyngeal exudate, posterior oropharyngeal edema or posterior oropharyngeal erythema.   Eyes: Conjunctivae and lids are normal. No scleral icterus.   Neck: Trachea normal and phonation normal. Neck supple. No edema present. No erythema present. No neck rigidity present.   Cardiovascular: Normal rate, regular rhythm, normal heart sounds and normal pulses.   Pulmonary/Chest: Effort normal and breath sounds normal. No respiratory distress. She has no decreased breath sounds. She has no wheezes. She has no rhonchi.   Abdominal: Normal appearance.   Musculoskeletal: Normal range of motion.         General: No deformity. Normal range of motion.   Neurological: She is alert and oriented to person, place, and time. She exhibits normal muscle tone. Coordination normal.   Skin: Skin is warm, dry, intact, not diaphoretic and not pale.   Psychiatric: Her speech is normal and behavior is normal. Judgment and thought content normal.   Nursing note and vitals reviewed.    Results for orders placed or performed in visit on 02/07/25   POCT Influenza A/B MOLECULAR    Collection Time: 02/07/25  5:10 PM   Result Value Ref Range    POC Molecular Influenza A Ag Positive (A) Negative    POC Molecular Influenza B Ag Negative Negative     Acceptable Yes    SARS Coronavirus 2 Antigen, POCT Manual Read    Collection Time: 02/07/25  5:11 PM   Result Value Ref Range    SARS Coronavirus 2 Antigen Negative Negative, Presumptive Negative     Acceptable Yes      Assessment:     1. Influenza A    2. Cough, unspecified type        Plan:       Influenza A  -     oseltamivir (TAMIFLU) 75 MG capsule; Take 1 capsule (75 mg total) by mouth 2 (two) times daily. for 5 days  Dispense: 10 capsule; Refill: 0    Cough, unspecified type  -     SARS Coronavirus 2 Antigen, POCT Manual Read  -     POCT  Influenza A/B MOLECULAR  -     promethazine-dextromethorphan (PROMETHAZINE-DM) 6.25-15 mg/5 mL Syrp; Take 5 mLs by mouth every 8 (eight) hours as needed (cough).  Dispense: 180 mL; Refill: 0      Patient Instructions   - You must understand that you have received an Urgent Care treatment only and that you may be released before all of your medical problems are known or treated.   - You, the patient, will arrange for follow up care as instructed.   - If your condition worsens or fails to improve we recommend that you receive another evaluation at the ER immediately or contact your PCP to discuss your concerns.   - You can call [phone] or [phone] to help schedule an appointment with the appropriate provider.    Drink plenty of fluids   Get lots of rest  Tylenol or ibuprofen for pain/fever  Mucinex DM for daytime cough  Prescription cough syrup for night time cough. This medication will make you drowsy. Do not drink alcohol or  Operate machinery while taking this medicine.   Saline nasal rinses to irrigate sinus cavities  Warm salt water gargles for sore throat

## 2025-02-15 ENCOUNTER — OFFICE VISIT (OUTPATIENT)
Dept: URGENT CARE | Facility: CLINIC | Age: 60
End: 2025-02-15
Payer: COMMERCIAL

## 2025-02-15 VITALS
BODY MASS INDEX: 33.3 KG/M2 | WEIGHT: 194 LBS | RESPIRATION RATE: 18 BRPM | TEMPERATURE: 99 F | OXYGEN SATURATION: 98 % | DIASTOLIC BLOOD PRESSURE: 80 MMHG | HEART RATE: 110 BPM | SYSTOLIC BLOOD PRESSURE: 128 MMHG

## 2025-02-15 DIAGNOSIS — R06.2 WHEEZING: Primary | ICD-10-CM

## 2025-02-15 RX ORDER — ALBUTEROL SULFATE 90 UG/1
2 INHALANT RESPIRATORY (INHALATION) EVERY 6 HOURS PRN
Qty: 18 G | Refills: 0 | Status: SHIPPED | OUTPATIENT
Start: 2025-02-15 | End: 2025-02-20

## 2025-02-15 NOTE — PROGRESS NOTES
Subjective:      Patient ID: Amber Khan is a 59 y.o. female.    Vitals:  weight is 88 kg (194 lb). Her oral temperature is 98.7 °F (37.1 °C). Her blood pressure is 128/80 and her pulse is 110. Her respiration is 18 and oxygen saturation is 98%.     Chief Complaint: Cough    This is a 59 y.o. female who presents today with a chief complaint of  cough. She states has taken COVID and FLU test. Flu test was positive she was prescribed cough Supressant and it does not seem to be helping. States has heard wheezing.    Cough  This is a new problem. The current episode started 1 to 4 weeks ago. The problem has been unchanged. The problem occurs constantly. The cough is Non-productive. Associated symptoms include nasal congestion, postnasal drip and wheezing. Pertinent negatives include no chest pain, chills, ear congestion, ear pain, fever, headaches, heartburn, hemoptysis, myalgias, rash, rhinorrhea, sore throat, sweats or weight loss. Nothing aggravates the symptoms. She has tried prescription cough suppressant for the symptoms. Her past medical history is significant for asthma and environmental allergies. There is no history of bronchiectasis, bronchitis, COPD, emphysema or pneumonia.       Constitution: Negative for chills and fever.   HENT:  Positive for congestion and postnasal drip. Negative for ear pain and sore throat.    Cardiovascular:  Negative for chest pain.   Eyes: Negative.    Respiratory:  Positive for cough and wheezing. Negative for bloody sputum.    Gastrointestinal:  Negative for heartburn.   Genitourinary: Negative.    Musculoskeletal: Negative.  Negative for muscle ache.   Skin:  Negative for rash.   Allergic/Immunologic: Positive for environmental allergies.   Neurological:  Negative for headaches.   Hematologic/Lymphatic: Negative.    Psychiatric/Behavioral: Negative.        Objective:     Physical Exam   Constitutional: She is oriented to person, place, and time. No distress.   HENT:   Head:  Normocephalic and atraumatic.   Ears:   Right Ear: Tympanic membrane, external ear and ear canal normal.   Left Ear: Tympanic membrane, external ear and ear canal normal.   Nose: Congestion present.   Mouth/Throat: Mucous membranes are moist.   Eyes: Conjunctivae are normal. Pupils are equal, round, and reactive to light.   Cardiovascular: Normal heart sounds.   Pulmonary/Chest: Effort normal. No stridor. No respiratory distress. She has wheezes. She has no rhonchi. She has no rales.   Musculoskeletal: Normal range of motion.         General: Normal range of motion.   Neurological: She is alert and oriented to person, place, and time.   Skin: Skin is warm, dry and not diaphoretic.   Psychiatric: Her behavior is normal. Mood, judgment and thought content normal.       Assessment:     1. Wheezing        Plan:       Wheezing  -     albuterol (VENTOLIN HFA) 90 mcg/actuation inhaler; Inhale 2 puffs into the lungs every 6 (six) hours as needed for Wheezing. Rescue  Dispense: 18 g; Refill: 0      Patient Instructions   - You must understand that you have received an Urgent Care treatment only and that you may be released before all of your medical problems are known or treated.   - You, the patient, will arrange for follow up care as instructed.   - If your condition worsens or fails to improve we recommend that you receive another evaluation at the ER immediately or contact your PCP to discuss your concerns or return here.  -continue prior prescribed medications.  -follow up with PCP in one week.

## 2025-02-15 NOTE — PATIENT INSTRUCTIONS
- You must understand that you have received an Urgent Care treatment only and that you may be released before all of your medical problems are known or treated.   - You, the patient, will arrange for follow up care as instructed.   - If your condition worsens or fails to improve we recommend that you receive another evaluation at the ER immediately or contact your PCP to discuss your concerns or return here.  -continue prior prescribed medications.  -follow up with PCP in one week.

## 2025-02-21 DIAGNOSIS — J30.9 ALLERGIC RHINITIS, UNSPECIFIED SEASONALITY, UNSPECIFIED TRIGGER: Chronic | ICD-10-CM

## 2025-02-24 RX ORDER — CETIRIZINE HYDROCHLORIDE 10 MG/1
10 TABLET ORAL
Qty: 90 TABLET | Refills: 1 | Status: SHIPPED | OUTPATIENT
Start: 2025-02-24

## 2025-03-09 DIAGNOSIS — J30.9 ALLERGIC RHINITIS, UNSPECIFIED SEASONALITY, UNSPECIFIED TRIGGER: Chronic | ICD-10-CM

## 2025-03-10 RX ORDER — MONTELUKAST SODIUM 10 MG/1
10 TABLET ORAL NIGHTLY
Qty: 90 TABLET | Refills: 3 | Status: SHIPPED | OUTPATIENT
Start: 2025-03-10

## 2025-06-19 ENCOUNTER — OFFICE VISIT (OUTPATIENT)
Dept: URGENT CARE | Facility: CLINIC | Age: 60
End: 2025-06-19
Payer: COMMERCIAL

## 2025-06-19 VITALS
HEART RATE: 87 BPM | TEMPERATURE: 100 F | SYSTOLIC BLOOD PRESSURE: 149 MMHG | RESPIRATION RATE: 16 BRPM | WEIGHT: 196.19 LBS | DIASTOLIC BLOOD PRESSURE: 70 MMHG | HEIGHT: 64 IN | OXYGEN SATURATION: 97 % | BODY MASS INDEX: 33.49 KG/M2

## 2025-06-19 DIAGNOSIS — H66.001 NON-RECURRENT ACUTE SUPPURATIVE OTITIS MEDIA OF RIGHT EAR WITHOUT SPONTANEOUS RUPTURE OF TYMPANIC MEMBRANE: Primary | ICD-10-CM

## 2025-06-19 DIAGNOSIS — J02.9 SORE THROAT: ICD-10-CM

## 2025-06-19 LAB
CTP QC/QA: YES
MOLECULAR STREP A: NEGATIVE

## 2025-06-19 PROCEDURE — 99214 OFFICE O/P EST MOD 30 MIN: CPT | Mod: S$GLB,,, | Performed by: NURSE PRACTITIONER

## 2025-06-19 PROCEDURE — 87651 STREP A DNA AMP PROBE: CPT | Mod: QW,S$GLB,, | Performed by: NURSE PRACTITIONER

## 2025-06-19 RX ORDER — AMOXICILLIN 875 MG/1
875 TABLET, COATED ORAL 2 TIMES DAILY
Qty: 20 TABLET | Refills: 0 | Status: SHIPPED | OUTPATIENT
Start: 2025-06-19 | End: 2025-06-29

## 2025-06-19 RX ORDER — AMITRIPTYLINE HYDROCHLORIDE 10 MG/1
TABLET, FILM COATED ORAL
COMMUNITY

## 2025-06-19 NOTE — PROGRESS NOTES
"Subjective:      Patient ID: Amber Khan is a 60 y.o. female.    Vitals:  height is 5' 4" (1.626 m) and weight is 89 kg (196 lb 3.4 oz). Her temperature is 100 °F (37.8 °C). Her blood pressure is 149/70 (abnormal) and her pulse is 87. Her respiration is 16 and oxygen saturation is 97%.     Chief Complaint: Sore Throat    Pt is a 60 y.o female who presents to clinic c.o sore throat x 2 weeks. She also reports blisters on the R side of her tongue and R ear pain. She has been using mouthwash for her symptoms with no relief.     Sore Throat   This is a new problem. The current episode started 1 to 4 weeks ago. The problem has been gradually worsening. The pain is worse on the right side. Associated symptoms include ear pain and swollen glands. She has tried gargles for the symptoms.       HENT:  Positive for ear pain and sore throat.       Objective:     Physical Exam   Constitutional: She is oriented to person, place, and time. She appears well-developed. She is cooperative.  Non-toxic appearance. She does not appear ill. No distress.   HENT:   Head: Normocephalic and atraumatic.   Ears:   Right Ear: Hearing, external ear and ear canal normal. There is tenderness. Tympanic membrane is erythematous. A middle ear effusion is present.   Left Ear: Hearing, tympanic membrane, external ear and ear canal normal. No tenderness. Tympanic membrane is not erythematous.  No middle ear effusion.   Nose: Nose normal. No mucosal edema, rhinorrhea or nasal deformity. No epistaxis. Right sinus exhibits no maxillary sinus tenderness and no frontal sinus tenderness. Left sinus exhibits no maxillary sinus tenderness and no frontal sinus tenderness.   Mouth/Throat: Uvula is midline and mucous membranes are normal. No trismus in the jaw. Normal dentition. No uvula swelling. Posterior oropharyngeal erythema present. No oropharyngeal exudate or posterior oropharyngeal edema.   Eyes: Conjunctivae and lids are normal. No scleral icterus. "   Neck: Trachea normal and phonation normal. Neck supple. No edema present. No erythema present. No neck rigidity present.   Cardiovascular: Normal rate, regular rhythm, normal heart sounds and normal pulses.   Pulmonary/Chest: Effort normal and breath sounds normal. No respiratory distress. She has no decreased breath sounds. She has no wheezes. She has no rhonchi.   Abdominal: Normal appearance.   Musculoskeletal: Normal range of motion.         General: No deformity. Normal range of motion.   Lymphadenopathy:        Head (right side): Preauricular and posterior auricular adenopathy present.        Head (left side): No preauricular and no posterior auricular adenopathy present.        Right cervical: No superficial cervical adenopathy present.       Left cervical: No superficial cervical adenopathy present.   Neurological: She is alert and oriented to person, place, and time. She exhibits normal muscle tone. Coordination normal.   Skin: Skin is warm, dry, intact, not diaphoretic and not pale.   Psychiatric: Her speech is normal and behavior is normal. Judgment and thought content normal.   Nursing note and vitals reviewed.    Results for orders placed or performed in visit on 06/19/25   POCT Strep A, Molecular    Collection Time: 06/19/25 11:26 AM   Result Value Ref Range    Molecular Strep A, POC Negative Negative     Acceptable Yes        Assessment:     1. Non-recurrent acute suppurative otitis media of right ear without spontaneous rupture of tympanic membrane    2. Sore throat        Plan:       Non-recurrent acute suppurative otitis media of right ear without spontaneous rupture of tympanic membrane  -     amoxicillin (AMOXIL) 875 MG tablet; Take 1 tablet (875 mg total) by mouth 2 (two) times daily. for 10 days  Dispense: 20 tablet; Refill: 0  -     (Magic mouthwash) 1:1:1 diphenhydrAMINE(Benadryl) 12.5mg/5ml liq, aluminum & magnesium hydroxide-simethicone (Maalox), LIDOcaine viscous 2%; Swish  and spit 10 mLs every 4 (four) hours as needed (sore throat).  Dispense: 180 mL; Refill: 0    Sore throat  -     POCT Strep A, Molecular      Patient Instructions   - You must understand that you have received an Urgent Care treatment only and that you may be released before all of your medical problems are known or treated.   - You, the patient, will arrange for follow up care as instructed.   - If your condition worsens or fails to improve we recommend that you receive another evaluation at the ER immediately or contact your PCP to discuss your concerns.   - You can call [phone] or [phone] to help schedule an appointment with the appropriate provider.    Drink plenty of fluids   Get lots of rest  Tylenol or ibuprofen for pain/fever  Warm salt water gargles for sore throat  Warm tea with honey and lemon for sore throat  Change your toothbrush in about 3 days to prevent the chance of reinfection  You are contagious and should stay home while sick. After 24 hours of antibiotics, you may return to work/school if you have not had a fever for 24 hours (without fever reducing medications) and otherwise feel well.   Use magic mouthwash as needed for sore throat. Swish, gargle, and spit. Do not swallow

## 2025-08-05 ENCOUNTER — OFFICE VISIT (OUTPATIENT)
Dept: URGENT CARE | Facility: CLINIC | Age: 60
End: 2025-08-05
Payer: COMMERCIAL

## 2025-08-05 VITALS
RESPIRATION RATE: 20 BRPM | HEIGHT: 64 IN | OXYGEN SATURATION: 96 % | HEART RATE: 93 BPM | SYSTOLIC BLOOD PRESSURE: 170 MMHG | TEMPERATURE: 98 F | BODY MASS INDEX: 31.58 KG/M2 | WEIGHT: 185 LBS | DIASTOLIC BLOOD PRESSURE: 82 MMHG

## 2025-08-05 DIAGNOSIS — B02.9 HERPES ZOSTER WITHOUT COMPLICATION: Primary | ICD-10-CM

## 2025-08-05 DIAGNOSIS — R03.0 ELEVATED BLOOD PRESSURE READING: ICD-10-CM

## 2025-08-05 PROBLEM — N95.2 VAGINAL ATROPHY: Status: ACTIVE | Noted: 2024-09-27

## 2025-08-05 PROCEDURE — 96372 THER/PROPH/DIAG INJ SC/IM: CPT | Mod: S$GLB,,, | Performed by: PHYSICIAN ASSISTANT

## 2025-08-05 PROCEDURE — 99214 OFFICE O/P EST MOD 30 MIN: CPT | Mod: 25,S$GLB,, | Performed by: PHYSICIAN ASSISTANT

## 2025-08-05 RX ORDER — MELOXICAM 15 MG/1
15 TABLET ORAL DAILY
Qty: 7 TABLET | Refills: 0 | Status: SHIPPED | OUTPATIENT
Start: 2025-08-06 | End: 2025-08-13

## 2025-08-05 RX ORDER — VALACYCLOVIR HYDROCHLORIDE 1 G/1
1000 TABLET, FILM COATED ORAL 3 TIMES DAILY
Qty: 30 TABLET | Refills: 0 | Status: SHIPPED | OUTPATIENT
Start: 2025-08-05 | End: 2025-08-15

## 2025-08-05 RX ORDER — KETOROLAC TROMETHAMINE 30 MG/ML
30 INJECTION, SOLUTION INTRAMUSCULAR; INTRAVENOUS
Status: COMPLETED | OUTPATIENT
Start: 2025-08-05 | End: 2025-08-05

## 2025-08-05 RX ADMIN — KETOROLAC TROMETHAMINE 30 MG: 30 INJECTION, SOLUTION INTRAMUSCULAR; INTRAVENOUS at 05:08

## 2025-08-05 NOTE — PROGRESS NOTES
"Subjective:      Patient ID: Amber Khan is a 60 y.o. female.    Vitals:  height is 5' 4" (1.626 m) and weight is 83.9 kg (185 lb). Her temperature is 98.1 °F (36.7 °C). Her blood pressure is 160/102 (abnormal) and her pulse is 93. Her respiration is 20 and oxygen saturation is 96%.     Chief Complaint: Rash    This is a 60 y.o. female   who presents today with a chief complaint of a rash that began three days ago. The rash is located on her arms and chest. She states that she's recently began taking a detox capsule. She's complaining of burning and pain. She's been taking benadryl, tylenol and ibuprofen to help relieve her symptoms.     Rash  This is a new problem. The current episode started in the past 7 days. The problem has been gradually worsening since onset. The affected locations include the right arm, chest and torso. The rash is characterized by pain, redness and burning. It is unknown if there was an exposure to a precipitant. Pertinent negatives include no anorexia, congestion, cough, diarrhea, eye pain, facial edema, fatigue, fever, joint pain, nail changes, rhinorrhea, shortness of breath, sore throat or vomiting. Treatments tried: benadryl, tylenol and ibuprofen. The treatment provided no relief.       Constitution: Negative for fatigue and fever.   HENT:  Negative for congestion and sore throat.    Eyes:  Negative for eye pain.   Respiratory:  Negative for cough and shortness of breath.    Gastrointestinal:  Negative for vomiting and diarrhea.   Skin:  Positive for rash.      Objective:     Physical Exam    Assessment:     No diagnosis found.    Plan:       There are no diagnoses linked to this encounter.       I have reviewed the patient chart and pertinent past imaging/labs.           "

## 2025-08-05 NOTE — PATIENT INSTRUCTIONS
You were given a Toradol injection today.  Do not start taking Mobic until tomorrow make sure to take with food and as prescribed.  As we discussed do not take Advil or ibuprofen with either these medications Tylenol is safe.  Take Valtrex as directed started tonight.  You should notice improvement in 2 or 3 days as we discussed the rashes contagious until it has crusted over and healed.  Your blood pressure is elevated today this could be due to your discomfort however when your symptoms have improved if it remains elevated please follow up with your primary care provider

## 2025-08-05 NOTE — PROGRESS NOTES
"Subjective:      Patient ID: Amber Khan is a 60 y.o. female.    Vitals:  height is 5' 4" (1.626 m) and weight is 83.9 kg (185 lb). Her temperature is 98.1 °F (36.7 °C). Her blood pressure is 170/82 (abnormal) and her pulse is 93. Her respiration is 20 and oxygen saturation is 96%.     Chief Complaint: Rash    60 y.o. female presents for rash that began three days ago. The rash is located on her R arm and chest.  She's complaining of burning and pain. She's been taking benadryl, tylenol and ibuprofen to help relieve her symptoms. She states that she's recently began taking a detox capsule, pt not taking anymore. She also recently had a lot of allergy s/s recently that she is recovering from.     Rash  This is a new problem. The current episode started in the past 7 days. The problem has been rapidly worsening since onset. The affected locations include the right arm, chest and torso. The rash is characterized by pain, redness and burning. It is unknown if there was an exposure to a precipitant. Pertinent negatives include no anorexia, cough, diarrhea, eye pain, facial edema, fever, joint pain, nail changes, rhinorrhea or shortness of breath. Treatments tried: benadryl, tylenol and ibuprofen. The treatment provided no relief. Her past medical history is significant for varicella.       Constitution: Negative for chills and fever.   Eyes:  Negative for eye pain.   Respiratory:  Negative for cough and shortness of breath.    Gastrointestinal:  Negative for nausea and diarrhea.   Musculoskeletal:  Negative for muscle ache.   Skin:  Positive for rash and lesion. Negative for erythema and bruising.   Neurological:  Negative for headaches, numbness, tingling and tremors.      Past Medical History:   Diagnosis Date    Allergy     Asthma     Fibromyalgia 2009    Hyperlipidemia     Hypertension     Hypothyroidism        Past Surgical History:   Procedure Laterality Date    COLONOSCOPY N/A 04/14/2023    Procedure: " COLONOSCOPY;  Surgeon: Luis Carlos Wilkins MD;  Location: [facility];  Service: Endoscopy;  Laterality: N/A;    HYSTERECTOMY      TUBAL LIGATION      TYMPANOSTOMY TUBE PLACEMENT         No family history on file.    Social History     Socioeconomic History    Marital status:    Tobacco Use    Smoking status: Never     Passive exposure: Never    Smokeless tobacco: Never   Substance and Sexual Activity    Alcohol use: No    Drug use: No    Sexual activity: Not Currently     Partners: Male     Social Drivers of Health     Financial Resource Strain: Low Risk  (6/4/2024)    Overall Financial Resource Strain (CARDIA)     Difficulty of Paying Living Expenses: Not hard at all   Food Insecurity: No Food Insecurity (6/4/2024)    Hunger Vital Sign     Worried About Running Out of Food in the Last Year: Never true     Ran Out of Food in the Last Year: Never true   Transportation Needs: No Transportation Needs (5/11/2024)    PRAPARE - Transportation     Lack of Transportation (Medical): No     Lack of Transportation (Non-Medical): No   Physical Activity: Inactive (6/4/2024)    Exercise Vital Sign     Days of Exercise per Week: 0 days     Minutes of Exercise per Session: 0 min   Stress: Stress Concern Present (6/4/2024)    [employer] [place] [employer] - Occupational Stress Questionnaire     Feeling of Stress : Rather much   Housing Stability: Unknown (6/4/2024)    Housing Stability Vital Sign     Unable to Pay for Housing in the Last Year: No       Current Medications[1]    Review of patient's allergies indicates:   Allergen Reactions    Tramadol Other (See Comments)     Severe cramping    tramadol       Objective:     Physical Exam   Constitutional: She is oriented to person, place, and time.  Non-toxic appearance. She does not appear ill. No distress.   HENT:   Head: Normocephalic and atraumatic.   Cardiovascular: Normal rate.   Pulmonary/Chest: Effort normal. No respiratory distress.   Abdominal: Normal  appearance.   Neurological: She is alert and oriented to person, place, and time.   Skin: Skin is warm, dry, not diaphoretic, not pale and rash. No bruising and No erythema        Nursing note and vitals reviewed.            Assessment:     1. Herpes zoster without complication    2. Elevated blood pressure reading        Plan:       Herpes zoster without complication  -     valACYclovir (VALTREX) 1000 MG tablet; Take 1 tablet (1,000 mg total) by mouth 3 (three) times daily. for 10 days  Dispense: 30 tablet; Refill: 0  -     ketorolac injection 30 mg  -     meloxicam (MOBIC) 15 MG tablet; Take 1 tablet (15 mg total) by mouth once daily. for 7 days  Dispense: 7 tablet; Refill: 0    Elevated blood pressure reading           I have reviewed the patient chart and pertinent past imaging/labs.  Pa-student lilly love    Consider giving steroids however with patient's elevated blood pressure and history of diabetes I decided not to give it.  Patient Instructions   You were given a Toradol injection today.  Do not start taking Mobic until tomorrow make sure to take with food and as prescribed.  As we discussed do not take Advil or ibuprofen with either these medications Tylenol is safe.  Take Valtrex as directed started tonight.  You should notice improvement in 2 or 3 days as we discussed the rashes contagious until it has crusted over and healed.  Your blood pressure is elevated today this could be due to your discomfort however when your symptoms have improved if it remains elevated please follow up with your primary care provider             [1]   Current Outpatient Medications   Medication Sig Dispense Refill    amLODIPine (NORVASC) 10 MG tablet       atorvastatin (LIPITOR) 20 MG tablet atorvastatin 20 mg tablet      cetirizine (ZYRTEC) 10 MG tablet TAKE 1 TABLET BY MOUTH EVERY DAY 90 tablet 1    dicyclomine (BENTYL) 20 mg tablet dicyclomine 20 mg tablet   Take 1 tablet 4 times a day by oral route for 30 days.       inhalation spacing device (BREATHERITE MDI SPACER) Use as directed for inhalation. 1 each 0    lansoprazole (PREVACID) 30 MG capsule Take 1 capsule (30 mg total) by mouth once daily. 90 capsule 3    levothyroxine (SYNTHROID) 25 MCG tablet Take 25 mcg by mouth once daily.      lisinopril (PRINIVIL,ZESTRIL) 20 MG tablet       lisinopril-hydrochlorothiazide (PRINZIDE,ZESTORETIC) 20-12.5 mg per tablet lisinopril 20 mg-hydrochlorothiazide 12.5 mg tablet   Take 1 tablet(s) every day by oral route.      meclizine (ANTIVERT) 25 mg tablet Take 1 tablet (25 mg total) by mouth 3 (three) times daily as needed for Dizziness. 30 tablet 0    metFORMIN (GLUCOPHAGE) 500 MG tablet Take 500 mg by mouth 2 (two) times daily with meals.      methylPREDNISolone (MEDROL DOSEPACK) 4 mg tablet use as directed 1 each 0    montelukast (SINGULAIR) 10 mg tablet TAKE 1 TABLET BY MOUTH EVERY DAY IN THE EVENING 90 tablet 3    olopatadine (PATANOL) 0.1 % ophthalmic solution Place 1 drop into both eyes 2 (two) times daily as needed for Allergies (itchy eyes). 5 mL 3    promethazine-dextromethorphan (PROMETHAZINE-DM) 6.25-15 mg/5 mL Syrp Take 5 mLs by mouth every 8 (eight) hours as needed (cough). 180 mL 0    sucralfate (CARAFATE) 1 gram tablet TAKE 1 TABLET (1 G TOTAL) BY MOUTH 3 (THREE) TIMES DAILY AS NEEDED (ABD SYMPTOMS). 270 tablet 1    VITAMIN D2 50,000 unit capsule       albuterol (VENTOLIN HFA) 90 mcg/actuation inhaler Inhale 2 puffs into the lungs every 6 (six) hours as needed for Wheezing. Rescue 18 g 0    divalproex ER (DEPAKOTE ER) 250 MG 24 hr tablet Take 1 tablet (250 mg total) by mouth once daily. 30 tablet 5    [START ON 8/6/2025] meloxicam (MOBIC) 15 MG tablet Take 1 tablet (15 mg total) by mouth once daily. for 7 days 7 tablet 0    valACYclovir (VALTREX) 1000 MG tablet Take 1 tablet (1,000 mg total) by mouth 3 (three) times daily. for 10 days 30 tablet 0     Current Facility-Administered Medications   Medication Dose Route Frequency  Provider Last Rate Last Admin    ketorolac injection 30 mg  30 mg Intramuscular 1 time in Clinic/HOD Deepika Martin PA-C

## 2025-08-12 ENCOUNTER — OFFICE VISIT (OUTPATIENT)
Dept: URGENT CARE | Facility: CLINIC | Age: 60
End: 2025-08-12
Payer: COMMERCIAL

## 2025-08-12 VITALS
HEIGHT: 64 IN | BODY MASS INDEX: 31.58 KG/M2 | RESPIRATION RATE: 16 BRPM | OXYGEN SATURATION: 98 % | DIASTOLIC BLOOD PRESSURE: 93 MMHG | TEMPERATURE: 99 F | HEART RATE: 89 BPM | WEIGHT: 185 LBS | SYSTOLIC BLOOD PRESSURE: 168 MMHG

## 2025-08-12 DIAGNOSIS — I10 ELEVATED BLOOD PRESSURE READING WITH DIAGNOSIS OF HYPERTENSION: ICD-10-CM

## 2025-08-12 DIAGNOSIS — B02.29 HZV (HERPES ZOSTER VIRUS) POST HERPETIC NEURALGIA: Primary | ICD-10-CM

## 2025-08-12 PROCEDURE — 99214 OFFICE O/P EST MOD 30 MIN: CPT | Mod: S$GLB,,,

## 2025-08-12 RX ORDER — GABAPENTIN 300 MG/1
CAPSULE ORAL
Qty: 42 CAPSULE | Refills: 0 | Status: SHIPPED | OUTPATIENT
Start: 2025-08-12

## 2025-08-29 ENCOUNTER — OFFICE VISIT (OUTPATIENT)
Dept: URGENT CARE | Facility: CLINIC | Age: 60
End: 2025-08-29
Payer: COMMERCIAL

## 2025-08-29 VITALS
RESPIRATION RATE: 20 BRPM | OXYGEN SATURATION: 98 % | HEART RATE: 70 BPM | WEIGHT: 184.94 LBS | TEMPERATURE: 98 F | BODY MASS INDEX: 31.57 KG/M2 | SYSTOLIC BLOOD PRESSURE: 169 MMHG | DIASTOLIC BLOOD PRESSURE: 93 MMHG | HEIGHT: 64 IN

## 2025-08-29 DIAGNOSIS — B02.29 POSTHERPETIC NEURALGIA: ICD-10-CM

## 2025-08-29 DIAGNOSIS — R73.03 PRE-DIABETES: Primary | ICD-10-CM

## 2025-08-29 LAB — GLUCOSE SERPL-MCNC: 87 MG/DL (ref 70–110)

## 2025-08-29 RX ORDER — VALACYCLOVIR HYDROCHLORIDE 1 G/1
1000 TABLET, FILM COATED ORAL 3 TIMES DAILY
Qty: 21 TABLET | Refills: 0 | Status: SHIPPED | OUTPATIENT
Start: 2025-08-29 | End: 2025-09-05

## 2025-08-29 RX ORDER — METHYLPREDNISOLONE 4 MG/1
TABLET ORAL
Qty: 21 EACH | Refills: 0 | Status: SHIPPED | OUTPATIENT
Start: 2025-08-29 | End: 2025-09-19

## 2025-08-29 RX ORDER — LIDOCAINE 50 MG/G
1 PATCH TOPICAL DAILY
Qty: 14 PATCH | Refills: 0 | Status: SHIPPED | OUTPATIENT
Start: 2025-08-29